# Patient Record
Sex: MALE | Race: WHITE | NOT HISPANIC OR LATINO | Employment: FULL TIME | URBAN - NONMETROPOLITAN AREA
[De-identification: names, ages, dates, MRNs, and addresses within clinical notes are randomized per-mention and may not be internally consistent; named-entity substitution may affect disease eponyms.]

---

## 2022-04-21 ENCOUNTER — HOSPITAL ENCOUNTER (OUTPATIENT)
Dept: RADIOLOGY | Facility: HOSPITAL | Age: 57
Discharge: HOME | End: 2022-04-21
Attending: NURSE PRACTITIONER
Payer: MEDICAID

## 2022-04-21 DIAGNOSIS — M54.50 LOW BACK PAIN: Primary | ICD-10-CM

## 2022-04-21 DIAGNOSIS — M54.50 LOW BACK PAIN: ICD-10-CM

## 2022-04-21 PROCEDURE — 72100 X-RAY EXAM L-S SPINE 2/3 VWS: CPT | Mod: TC

## 2022-12-19 ENCOUNTER — HOSPITAL ENCOUNTER (OUTPATIENT)
Dept: PULMONOLOGY | Facility: HOSPITAL | Age: 57
Discharge: HOME | End: 2022-12-19
Attending: NURSE PRACTITIONER
Payer: MEDICAID

## 2022-12-19 DIAGNOSIS — Z01.818 PREOPERATIVE CLEARANCE: ICD-10-CM

## 2022-12-19 DIAGNOSIS — Z01.818 PREOPERATIVE CLEARANCE: Primary | ICD-10-CM

## 2022-12-19 PROCEDURE — 93010 ELECTROCARDIOGRAM REPORT: CPT | Mod: ,,, | Performed by: INTERNAL MEDICINE

## 2022-12-19 PROCEDURE — 93010 EKG 12-LEAD: ICD-10-PCS | Mod: ,,, | Performed by: INTERNAL MEDICINE

## 2022-12-19 PROCEDURE — 93005 ELECTROCARDIOGRAM TRACING: CPT

## 2023-11-20 ENCOUNTER — HOSPITAL ENCOUNTER (EMERGENCY)
Facility: HOSPITAL | Age: 58
Discharge: ANOTHER HOSPITAL | End: 2023-11-20
Attending: EMERGENCY MEDICINE
Payer: MEDICAID

## 2023-11-20 ENCOUNTER — HOSPITAL ENCOUNTER (INPATIENT)
Facility: HOSPITAL | Age: 58
LOS: 2 days | Discharge: HOME | DRG: 392 | End: 2023-11-22
Attending: INTERNAL MEDICINE | Admitting: INTERNAL MEDICINE
Payer: MEDICAID

## 2023-11-20 VITALS
HEIGHT: 73 IN | DIASTOLIC BLOOD PRESSURE: 76 MMHG | HEART RATE: 114 BPM | WEIGHT: 227 LBS | TEMPERATURE: 98 F | OXYGEN SATURATION: 94 % | RESPIRATION RATE: 20 BRPM | BODY MASS INDEX: 30.09 KG/M2 | SYSTOLIC BLOOD PRESSURE: 104 MMHG

## 2023-11-20 DIAGNOSIS — C79.9 METASTATIC MALIGNANT NEOPLASM, UNSPECIFIED SITE: ICD-10-CM

## 2023-11-20 DIAGNOSIS — K31.89 MASS OF STOMACH: ICD-10-CM

## 2023-11-20 DIAGNOSIS — R07.9 CHEST PAIN: ICD-10-CM

## 2023-11-20 DIAGNOSIS — K31.89 GASTRIC MASS: ICD-10-CM

## 2023-11-20 DIAGNOSIS — K31.89 MASS OF GASTROESOPHAGEAL JUNCTION: Primary | ICD-10-CM

## 2023-11-20 DIAGNOSIS — R13.10 DYSPHAGIA, UNSPECIFIED TYPE: Primary | ICD-10-CM

## 2023-11-20 PROBLEM — D64.9 NORMOCYTIC ANEMIA: Status: ACTIVE | Noted: 2023-11-20

## 2023-11-20 PROBLEM — R63.4 UNINTENTIONAL WEIGHT LOSS: Chronic | Status: ACTIVE | Noted: 2023-11-20

## 2023-11-20 PROBLEM — R13.19 OTHER DYSPHAGIA: Chronic | Status: ACTIVE | Noted: 2023-11-20

## 2023-11-20 LAB
ALBUMIN SERPL BCP-MCNC: 2.9 G/DL (ref 3.5–5.2)
ALP SERPL-CCNC: 66 U/L (ref 55–135)
ALT SERPL W/O P-5'-P-CCNC: 53 U/L (ref 10–44)
ANION GAP SERPL CALC-SCNC: 9 MMOL/L (ref 3–11)
AST SERPL-CCNC: 35 U/L (ref 10–40)
BASOPHILS # BLD AUTO: 0.04 K/UL (ref 0–0.2)
BASOPHILS NFR BLD: 0.4 % (ref 0–1.9)
BILIRUB SERPL-MCNC: 0.8 MG/DL (ref 0.1–1)
BUN SERPL-MCNC: 11 MG/DL (ref 6–20)
CALCIUM SERPL-MCNC: 8.8 MG/DL (ref 8.7–10.5)
CHLORIDE SERPL-SCNC: 108 MMOL/L (ref 95–110)
CO2 SERPL-SCNC: 26 MMOL/L (ref 23–29)
CREAT SERPL-MCNC: 0.9 MG/DL (ref 0.5–1.4)
DIFFERENTIAL METHOD: ABNORMAL
EOSINOPHIL # BLD AUTO: 0 K/UL (ref 0–0.5)
EOSINOPHIL NFR BLD: 0.3 % (ref 0–8)
ERYTHROCYTE [DISTWIDTH] IN BLOOD BY AUTOMATED COUNT: 13.4 % (ref 11.5–14.5)
EST. GFR  (NO RACE VARIABLE): >60 ML/MIN/1.73 M^2
GLUCOSE SERPL-MCNC: 114 MG/DL (ref 70–110)
HCT VFR BLD AUTO: 42.4 % (ref 40–54)
HGB BLD-MCNC: 13.6 G/DL (ref 14–18)
IMM GRANULOCYTES # BLD AUTO: 0.03 K/UL (ref 0–0.04)
IMM GRANULOCYTES NFR BLD AUTO: 0.3 % (ref 0–0.5)
LYMPHOCYTES # BLD AUTO: 1.4 K/UL (ref 1–4.8)
LYMPHOCYTES NFR BLD: 12.8 % (ref 18–48)
MAGNESIUM SERPL-MCNC: 2.2 MG/DL (ref 1.6–2.6)
MCH RBC QN AUTO: 28.3 PG (ref 27–31)
MCHC RBC AUTO-ENTMCNC: 32.1 G/DL (ref 32–36)
MCV RBC AUTO: 88 FL (ref 82–98)
MONOCYTES # BLD AUTO: 0.9 K/UL (ref 0.3–1)
MONOCYTES NFR BLD: 7.7 % (ref 4–15)
NEUTROPHILS # BLD AUTO: 8.7 K/UL (ref 1.8–7.7)
NEUTROPHILS NFR BLD: 78.5 % (ref 38–73)
NRBC BLD-RTO: 0 /100 WBC
PLATELET # BLD AUTO: 238 K/UL (ref 150–450)
PMV BLD AUTO: 11.5 FL (ref 9.2–12.9)
POTASSIUM SERPL-SCNC: 3.8 MMOL/L (ref 3.5–5.1)
PROT SERPL-MCNC: 6.5 G/DL (ref 6–8.4)
RBC # BLD AUTO: 4.8 M/UL (ref 4.6–6.2)
SODIUM SERPL-SCNC: 143 MMOL/L (ref 136–145)
WBC # BLD AUTO: 11.02 K/UL (ref 3.9–12.7)

## 2023-11-20 PROCEDURE — 96374 THER/PROPH/DIAG INJ IV PUSH: CPT

## 2023-11-20 PROCEDURE — 63600175 PHARM REV CODE 636 W HCPCS: Performed by: EMERGENCY MEDICINE

## 2023-11-20 PROCEDURE — 80053 COMPREHEN METABOLIC PANEL: CPT | Performed by: EMERGENCY MEDICINE

## 2023-11-20 PROCEDURE — 36415 COLL VENOUS BLD VENIPUNCTURE: CPT | Performed by: EMERGENCY MEDICINE

## 2023-11-20 PROCEDURE — 83735 ASSAY OF MAGNESIUM: CPT | Performed by: EMERGENCY MEDICINE

## 2023-11-20 PROCEDURE — 63600175 PHARM REV CODE 636 W HCPCS: Performed by: STUDENT IN AN ORGANIZED HEALTH CARE EDUCATION/TRAINING PROGRAM

## 2023-11-20 PROCEDURE — 25500020 PHARM REV CODE 255: Performed by: EMERGENCY MEDICINE

## 2023-11-20 PROCEDURE — 99285 EMERGENCY DEPT VISIT HI MDM: CPT | Mod: 25

## 2023-11-20 PROCEDURE — 20600001 HC STEP DOWN PRIVATE ROOM

## 2023-11-20 PROCEDURE — 96361 HYDRATE IV INFUSION ADD-ON: CPT

## 2023-11-20 PROCEDURE — 85025 COMPLETE CBC W/AUTO DIFF WBC: CPT | Performed by: EMERGENCY MEDICINE

## 2023-11-20 RX ORDER — POLYETHYLENE GLYCOL 3350 17 G/17G
17 POWDER, FOR SOLUTION ORAL DAILY PRN
Status: DISCONTINUED | OUTPATIENT
Start: 2023-11-20 | End: 2023-11-22 | Stop reason: HOSPADM

## 2023-11-20 RX ORDER — ONDANSETRON 8 MG/1
8 TABLET, ORALLY DISINTEGRATING ORAL EVERY 8 HOURS PRN
Status: DISCONTINUED | OUTPATIENT
Start: 2023-11-20 | End: 2023-11-22 | Stop reason: HOSPADM

## 2023-11-20 RX ORDER — SODIUM CHLORIDE, SODIUM LACTATE, POTASSIUM CHLORIDE, CALCIUM CHLORIDE 600; 310; 30; 20 MG/100ML; MG/100ML; MG/100ML; MG/100ML
INJECTION, SOLUTION INTRAVENOUS CONTINUOUS
Status: DISCONTINUED | OUTPATIENT
Start: 2023-11-20 | End: 2023-11-21

## 2023-11-20 RX ORDER — SODIUM CHLORIDE 0.9 % (FLUSH) 0.9 %
1-10 SYRINGE (ML) INJECTION EVERY 12 HOURS PRN
Status: DISCONTINUED | OUTPATIENT
Start: 2023-11-20 | End: 2023-11-22 | Stop reason: HOSPADM

## 2023-11-20 RX ORDER — MAG HYDROX/ALUMINUM HYD/SIMETH 200-200-20
30 SUSPENSION, ORAL (FINAL DOSE FORM) ORAL 4 TIMES DAILY PRN
Status: DISCONTINUED | OUTPATIENT
Start: 2023-11-20 | End: 2023-11-22 | Stop reason: HOSPADM

## 2023-11-20 RX ORDER — ACETAMINOPHEN 325 MG/1
650 TABLET ORAL EVERY 4 HOURS PRN
Status: DISCONTINUED | OUTPATIENT
Start: 2023-11-20 | End: 2023-11-22 | Stop reason: HOSPADM

## 2023-11-20 RX ORDER — ACETAMINOPHEN 325 MG/1
650 TABLET ORAL EVERY 8 HOURS PRN
Status: DISCONTINUED | OUTPATIENT
Start: 2023-11-20 | End: 2023-11-22 | Stop reason: HOSPADM

## 2023-11-20 RX ORDER — SIMETHICONE 80 MG
1 TABLET,CHEWABLE ORAL 4 TIMES DAILY PRN
Status: DISCONTINUED | OUTPATIENT
Start: 2023-11-20 | End: 2023-11-22 | Stop reason: HOSPADM

## 2023-11-20 RX ORDER — NALOXONE HCL 0.4 MG/ML
0.02 VIAL (ML) INJECTION
Status: DISCONTINUED | OUTPATIENT
Start: 2023-11-20 | End: 2023-11-22 | Stop reason: HOSPADM

## 2023-11-20 RX ORDER — TALC
6 POWDER (GRAM) TOPICAL NIGHTLY PRN
Status: DISCONTINUED | OUTPATIENT
Start: 2023-11-20 | End: 2023-11-22 | Stop reason: HOSPADM

## 2023-11-20 RX ORDER — ONDANSETRON 2 MG/ML
4 INJECTION INTRAMUSCULAR; INTRAVENOUS
Status: COMPLETED | OUTPATIENT
Start: 2023-11-20 | End: 2023-11-20

## 2023-11-20 RX ADMIN — SODIUM CHLORIDE, POTASSIUM CHLORIDE, SODIUM LACTATE AND CALCIUM CHLORIDE: 600; 310; 30; 20 INJECTION, SOLUTION INTRAVENOUS at 08:11

## 2023-11-20 RX ADMIN — SODIUM CHLORIDE, POTASSIUM CHLORIDE, SODIUM LACTATE AND CALCIUM CHLORIDE 1000 ML: 600; 310; 30; 20 INJECTION, SOLUTION INTRAVENOUS at 01:11

## 2023-11-20 RX ADMIN — ONDANSETRON 4 MG: 2 INJECTION INTRAMUSCULAR; INTRAVENOUS at 01:11

## 2023-11-20 RX ADMIN — IOHEXOL 100 ML: 350 INJECTION, SOLUTION INTRAVENOUS at 02:11

## 2023-11-20 NOTE — ED PROVIDER NOTES
Encounter Date: 11/20/2023       History     Chief Complaint   Patient presents with    Dysphagia     Pt reports having swallowing problems x2 weeks now, did follow up with PCP and has test scheduled but it is getting worse and now he is weak.      Patient presents emergency department for generalized fatigue problems swallowing solid foods for the last 3 weeks with a 30 lb weight loss.  He has been drinking liquids in protein shakes but eating solid foods he feels get stuck in his throat he has to gag a backup.  No history of cancer.  Denies drinking alcohol or cigarette smoking.  He denies any pain in his chest or abdomen      Review of patient's allergies indicates:   Allergen Reactions    Nsaids (non-steroidal anti-inflammatory drug) Hives     With ASA and Ibuprofen     History reviewed. No pertinent past medical history.  History reviewed. No pertinent surgical history.  History reviewed. No pertinent family history.     Review of Systems   Gastrointestinal:         Dysphagia   All other systems reviewed and are negative.      Physical Exam     Initial Vitals [11/20/23 1219]   BP Pulse Resp Temp SpO2   105/65 102 20 97.5 °F (36.4 °C) 95 %      MAP       --         Physical Exam    Nursing note and vitals reviewed.  Constitutional: He appears well-developed and well-nourished.   HENT:   Head: Normocephalic and atraumatic.   Mildly dry oral mucous membranes   Eyes: EOM are normal. Pupils are equal, round, and reactive to light.   Neck:   Normal range of motion.  Cardiovascular:  Normal rate and regular rhythm.           Pulmonary/Chest: Breath sounds normal. No respiratory distress. He has no wheezes. He has no rales.   Abdominal: Abdomen is soft. Bowel sounds are normal. He exhibits no distension and no mass. There is no abdominal tenderness. There is no guarding.   Musculoskeletal:         General: Normal range of motion.      Cervical back: Normal range of motion.     Neurological: He is alert and oriented to  person, place, and time.         ED Course   Procedures  Labs Reviewed   CBC W/ AUTO DIFFERENTIAL - Abnormal; Notable for the following components:       Result Value    Hemoglobin 13.6 (*)     Gran # (ANC) 8.7 (*)     Gran % 78.5 (*)     Lymph % 12.8 (*)     All other components within normal limits   COMPREHENSIVE METABOLIC PANEL - Abnormal; Notable for the following components:    Glucose 114 (*)     Albumin 2.9 (*)     ALT 53 (*)     All other components within normal limits    Narrative:     Recoll. [id number] by BGB at 11/20/2023 13:25, reason: Specimen   hemolyzed   MAGNESIUM    Narrative:     Recoll. [id number] by BGB at 11/20/2023 13:25, reason: Specimen   hemolyzed          Imaging Results              CT Abdomen Pelvis With IV Contrast (Final result)  Result time 11/20/23 14:44:11      Final result by Travis Reyez MD (11/20/23 14:44:11)                   Impression:      1. A large solid mass which appears to be at least partially contiguous with the lesser curvature of the stomach, raising the question of gastric malignancy.  2. There are multiple enlarged upper abdominal and periaortic lymph nodes, consistent with metastatic disease.  Given the extent of michael enlargement, these findings raise the question of lymphoma with involvement of the stomach.  3. An oval-shaped hypodense lesion in the right hepatic lobe, too small to further characterize.  This could reflect a small cyst or metastatic lesion.      Electronically signed by: Travis Reyez MD  Date:    11/20/2023  Time:    14:44               Narrative:    EXAMINATION:  CT ABDOMEN PELVIS WITH IV CONTRAST    CLINICAL HISTORY:  Dysphagia, 30 lb weight loss in 3 weeks;    TECHNIQUE:  Axial CT images were obtained. Iterative reconstruction technique was used. CT/cardiac nuclear exam/s in prior 12 months: 0.    COMPARISON:  CT chest with IV contrast 11/20/2023.    FINDINGS:  In the right hepatic lobe, a 1.3 cm oval-shaped hypodense lesion,  too small to further characterize (image 41 of series 4 axial).    No gallstones.  The spleen, pancreas, adrenal glands and kidneys demonstrate no abnormality.    There is a moderate to large size hiatal hernia containing the proximal stomach.  There are multiple solid masses in the upper abdomen including along the lesser curvature of the stomach.  Some of these masses appear to be contiguous with the stomach (for example, image 32 of series 4 axial).    Some of these masses appear separate and others appear contiguous.  The largest component of the mass appears to measure approximately 11 x 8 cm (image 33 of series 4 axial).  This measures approximately 8 cm in craniocaudal dimension (image 75 of series 8 coronal).    There are no dilated loops of bowel.  The appendix is not visualized.  No pericecal inflammatory change.  Small amount of free fluid in the pelvis.  No free air.    There are urinary bladder diverticula.    No aortic aneurysm.    Multiple enlarged upper abdominal and periaortic lymph nodes, some of which appear contiguous.  A large michael mass in the upper abdomen measures approximately 7 x 5.5 cm (image 46 of series 4 axial).    Visualized lung bases will be discussed in the chest CT report of same day.  There is no osseous abnormality.                                       CT Chest With Contrast (Final result)  Result time 11/20/23 14:35:09      Final result by Travis Reyez MD (11/20/23 14:35:09)                   Impression:      1. Mild pulmonary emphysema.  2. Bronchitis.  3. Moderate to large size hiatal hernia containing the proximal stomach.      Electronically signed by: Travis Reyez MD  Date:    11/20/2023  Time:    14:35               Narrative:    EXAMINATION:  CT CHEST WITH CONTRAST    CLINICAL HISTORY:  Dysphagia, 30 lb weight loss over 3 weeks;    TECHNIQUE:  Axial CT images were obtained. Iterative reconstruction technique was used.  CT/cardiac nuclear exam/s in prior 12  months: 0.    FINDINGS:  Breathing motion artifact on some images.    There is mucosal thickening in the airways bilaterally.    Mild pulmonary emphysema.  There is no pleural or pericardial fluid.  No aortic aneurysm.  No hilar or mediastinal lymph node enlargement.  There is a moderate to large size hiatal hernia containing the proximal stomach.  The visualized upper abdomen will be discussed in the abdomen pelvis CT report of same day.  No axillary lymph node enlargement.  No osseous abnormality.                                       Medications   lactated ringers bolus 1,000 mL (0 mLs Intravenous Stopped 11/20/23 1445)   ondansetron injection 4 mg (4 mg Intravenous Given 11/20/23 1316)   iohexoL (OMNIPAQUE 350) injection 100 mL (100 mLs Intravenous Given 11/20/23 1410)     Medical Decision Making  Amount and/or Complexity of Data Reviewed  Labs: ordered.  Radiology: ordered.    Risk  Prescription drug management.                          Medical Decision Making:   Initial Assessment:   The patient has been having symptoms that started 3 weeks ago and if not improved problems swallowing solid foods not improving.  Recent fevers illnesses no chest pain cough congestion or shortness of breath and denies any abdominal pain.  Denies any history of cancer.  Does not smoke cigarettes or drink alcohol.  Workup currently in progress  Differential Diagnosis:   Stricture, neurologic dysfunction, achalasia, neoplasm  ED Management:  Transfer for higher level of care, patient has mass in stomach, dysphagia, concern for mets          Clinical Impression:  Final diagnoses:  [R13.10] Dysphagia, unspecified type (Primary)  [K31.89] Mass of stomach  [C79.9] Metastatic malignant neoplasm, unspecified site          ED Disposition Condition    Transfer to Another Facility Stable                Dakotah Liu MD  11/21/23 9951

## 2023-11-20 NOTE — PROVIDER TRANSFER
Outside Transfer Acceptance Note / Regional Referral Center    Referring facility: [facility]   Referring provider: GAVIOTA HINKLE  Accepting facility: [facility]  Accepting provider: Jose Orellana MD  Admitting provider: [facility] provider  Reason for transfer:  heme/onc and GI evaluation   Transfer diagnosis: gastric mass  metastatic disease  dysphagia  Transfer specialty requested: Gastroenterology  Transfer specialty notified: Yes  Transfer level: NUMBER 1-5: 2  Bed type requested: med/tele  Isolation status: No active isolations   Admission class or status: IP- Inpatient      Narrative     Mr. David Benjamin is a 58 year old gentleman with no PMH of chronic lower back pain who presented to [facility] ED with chief complain of dysphagia, decreased po intake and progressive generalized weakness for the past 3 weeks. Patient states that he was able to followup with his PCP and get a test ordered as part the workup for his dysphagia but his symptoms worsened to the poin that he had to go to the ED. Patient also mentions fatigue, dysphagia with solids and a 30 pound weight loss in the past 3 weeks.  He has been drinking liquids and protein shakes but when he eats solid foods, he feels like the food gets stuck in his throat and he is forced to gag it back up. He denies fever, chills, chest pain, SOB, nausea, abdominal pain, swelling, confusion or bleeding. Denies any history of cancer.  Does not smoke cigarettes or drink alcohol. Patient notes that he was taking Tramadol for his previous back surgery but discontinued the medication due to concern that his dysphagia was an allergic reaction to the medication. Patient reports slight relief in his lower back pain from a steroid shot given by his PCP but notes increased BLE weakness upon ED arrival.      In the ED, vital signs showed: temp 97.5 F, /65, , RR 20, O2 sat 95% on room air.   Physical exam notable for dry  "mucous membranes, lungs clear to auscultation and abdomen soft, nontender. No focal neuro deficits appreciated. Patient alert and oriented x4. Labwork showed:    Recent Labs   Lab 11/20/23  1312   WBC 11.02   RBC 4.80   HGB 13.6*   HCT 42.4      MCV 88   MCH 28.3   MCHC 32.1      Recent Labs   Lab 11/20/23  1334   CALCIUM 8.8   ALBUMIN 2.9*   PROT 6.5      K 3.8   CO2 26      BUN 11   CREATININE 0.9   ALKPHOS 66   ALT 53*   AST 35   BILITOT 0.8      Recent Labs   Lab 11/20/23  1334   ALT 53*   AST 35   ALKPHOS 66   BILITOT 0.8   PROT 6.5   ALBUMIN 2.9*      CT A/P with IV contrast showed "A large solid mass which appears to be at least partially contiguous with the lesser curvature of the stomach, raising the question of gastric malignancy. multiple enlarged upper abdominal and periaortic lymph nodes, consistent with metastatic disease.  Given the extent of michael enlargement, these findings raise the question of lymphoma with involvement of the stomach. An oval-shaped hypodense lesion in the right hepatic lobe, too small to further characterize.  This could reflect a small cyst or metastatic lesion." CXR showed " Mild pulmonary emphysema. Bronchitis. Moderate to large size hiatal hernia containing the proximal stomach."  Patient was given 1 L NS bolus and one dose of 4mg IV zofran.  Dr. Dakotah Liu ([facility] ED provider) contacted [facility] to request patient transfer to [facility] for heme/onc evaluation of gastric mass concerning for metastases. [facility] notified Dr. Stallworth ([facility] heme/onc) to discuss the case and agreed to consult on the patient with admission to Hospital Medicine. Patient will also benefit from GI consultation for evaluation of dysphagia and possible biopsy of gastric mass. Patient will require med/tele bed.      Objective     Vitals:  see above  Recent Labs: All pertinent labs within the past 24 hours have been reviewed.see above  Recent imaging: see above   Airway:   RA  Vent " settings:  N/A       IV access:        Peripheral IV - Single Lumen 11/20/23 1351 20 G Left Hand (Active)   Site Assessment Clean;Dry;Intact;No redness;No swelling 11/20/23 1352   Line Status Blood return noted;Saline locked;Flushed 11/20/23 1352   Dressing Status Clean;Dry;Intact 11/20/23 1352     Infusions: None  Allergies:   Review of patient's allergies indicates:   Allergen Reactions    Advil [ibuprofen]     Penicillins       NPO: No    Anticoagulation:   Anticoagulants       None             Instructions      [facility]-  Admit to Hospital Medicine  Upon patient arrival to floor, please send SecureChat to [facility] or call extension [phone] (if no answer, do NOT leave a callback number after the beep, rather please send a SecureChat to [facility]), for Hospital Medicine admit team assignment and for additional admit orders for the patient.  Do not page the attending physician associated with the patient on arrival (this physician may not be on duty at the time of arrival).  Rather, always send a SecureChat to [facility] or call [phone] to reach the triage physician for orders and team assignment.

## 2023-11-20 NOTE — ED NOTES
Patient reports that he was taking Tramadol for his previous back surgery. Patient stopped taking Tramadol approximately 3 weeks ago believing that he was having an allergic reaction causing his swallowing problems. Patient reports slight relief from a steroid shot given by his PCP on Friday but increased weakness today. Patient reports approximately 25 pound weight loss.

## 2023-11-21 ENCOUNTER — ANESTHESIA EVENT (OUTPATIENT)
Dept: ENDOSCOPY | Facility: HOSPITAL | Age: 58
DRG: 392 | End: 2023-11-21
Payer: MEDICAID

## 2023-11-21 ENCOUNTER — ANESTHESIA (OUTPATIENT)
Dept: ENDOSCOPY | Facility: HOSPITAL | Age: 58
DRG: 392 | End: 2023-11-21
Payer: MEDICAID

## 2023-11-21 LAB
ALBUMIN SERPL BCP-MCNC: 2.9 G/DL (ref 3.5–5.2)
ALP SERPL-CCNC: 64 U/L (ref 55–135)
ALT SERPL W/O P-5'-P-CCNC: 58 U/L (ref 10–44)
ANION GAP SERPL CALC-SCNC: 9 MMOL/L (ref 8–16)
AST SERPL-CCNC: 35 U/L (ref 10–40)
BASOPHILS # BLD AUTO: 0.03 K/UL (ref 0–0.2)
BASOPHILS NFR BLD: 0.3 % (ref 0–1.9)
BILIRUB SERPL-MCNC: 0.8 MG/DL (ref 0.1–1)
BUN SERPL-MCNC: 12 MG/DL (ref 6–20)
CALCIUM SERPL-MCNC: 8.6 MG/DL (ref 8.7–10.5)
CEA SERPL-MCNC: 2.6 NG/ML (ref 0–5)
CHLORIDE SERPL-SCNC: 106 MMOL/L (ref 95–110)
CO2 SERPL-SCNC: 24 MMOL/L (ref 23–29)
CREAT SERPL-MCNC: 0.7 MG/DL (ref 0.5–1.4)
DIFFERENTIAL METHOD: ABNORMAL
EOSINOPHIL # BLD AUTO: 0.1 K/UL (ref 0–0.5)
EOSINOPHIL NFR BLD: 0.6 % (ref 0–8)
ERYTHROCYTE [DISTWIDTH] IN BLOOD BY AUTOMATED COUNT: 13.5 % (ref 11.5–14.5)
EST. GFR  (NO RACE VARIABLE): >60 ML/MIN/1.73 M^2
GLUCOSE SERPL-MCNC: 138 MG/DL (ref 70–110)
HCT VFR BLD AUTO: 38.3 % (ref 40–54)
HGB BLD-MCNC: 12.3 G/DL (ref 14–18)
IMM GRANULOCYTES # BLD AUTO: 0.05 K/UL (ref 0–0.04)
IMM GRANULOCYTES NFR BLD AUTO: 0.5 % (ref 0–0.5)
LYMPHOCYTES # BLD AUTO: 1.6 K/UL (ref 1–4.8)
LYMPHOCYTES NFR BLD: 14.6 % (ref 18–48)
MAGNESIUM SERPL-MCNC: 1.8 MG/DL (ref 1.6–2.6)
MCH RBC QN AUTO: 28 PG (ref 27–31)
MCHC RBC AUTO-ENTMCNC: 32.1 G/DL (ref 32–36)
MCV RBC AUTO: 87 FL (ref 82–98)
MONOCYTES # BLD AUTO: 0.8 K/UL (ref 0.3–1)
MONOCYTES NFR BLD: 7.3 % (ref 4–15)
NEUTROPHILS # BLD AUTO: 8.2 K/UL (ref 1.8–7.7)
NEUTROPHILS NFR BLD: 76.7 % (ref 38–73)
NRBC BLD-RTO: 0 /100 WBC
PLATELET # BLD AUTO: 233 K/UL (ref 150–450)
PMV BLD AUTO: 11.8 FL (ref 9.2–12.9)
POTASSIUM SERPL-SCNC: 3.8 MMOL/L (ref 3.5–5.1)
PROT SERPL-MCNC: 6 G/DL (ref 6–8.4)
RBC # BLD AUTO: 4.4 M/UL (ref 4.6–6.2)
SODIUM SERPL-SCNC: 139 MMOL/L (ref 136–145)
WBC # BLD AUTO: 10.63 K/UL (ref 3.9–12.7)

## 2023-11-21 PROCEDURE — 43239 EGD BIOPSY SINGLE/MULTIPLE: CPT | Mod: ,,, | Performed by: STUDENT IN AN ORGANIZED HEALTH CARE EDUCATION/TRAINING PROGRAM

## 2023-11-21 PROCEDURE — 99223 PR INITIAL HOSPITAL CARE,LEVL III: ICD-10-PCS | Mod: ,,, | Performed by: SURGERY

## 2023-11-21 PROCEDURE — 37000008 HC ANESTHESIA 1ST 15 MINUTES: Performed by: STUDENT IN AN ORGANIZED HEALTH CARE EDUCATION/TRAINING PROGRAM

## 2023-11-21 PROCEDURE — 63600175 PHARM REV CODE 636 W HCPCS: Performed by: NURSE ANESTHETIST, CERTIFIED REGISTERED

## 2023-11-21 PROCEDURE — 80053 COMPREHEN METABOLIC PANEL: CPT | Performed by: HOSPITALIST

## 2023-11-21 PROCEDURE — 25000003 PHARM REV CODE 250: Performed by: STUDENT IN AN ORGANIZED HEALTH CARE EDUCATION/TRAINING PROGRAM

## 2023-11-21 PROCEDURE — 25000003 PHARM REV CODE 250: Performed by: NURSE ANESTHETIST, CERTIFIED REGISTERED

## 2023-11-21 PROCEDURE — D9220A PRA ANESTHESIA: ICD-10-PCS | Mod: CRNA,,, | Performed by: NURSE ANESTHETIST, CERTIFIED REGISTERED

## 2023-11-21 PROCEDURE — 92610 EVALUATE SWALLOWING FUNCTION: CPT

## 2023-11-21 PROCEDURE — 99223 1ST HOSP IP/OBS HIGH 75: CPT | Mod: ,,, | Performed by: SURGERY

## 2023-11-21 PROCEDURE — 82378 CARCINOEMBRYONIC ANTIGEN: CPT | Performed by: HOSPITALIST

## 2023-11-21 PROCEDURE — 20600001 HC STEP DOWN PRIVATE ROOM

## 2023-11-21 PROCEDURE — 27201012 HC FORCEPS, HOT/COLD, DISP: Performed by: STUDENT IN AN ORGANIZED HEALTH CARE EDUCATION/TRAINING PROGRAM

## 2023-11-21 PROCEDURE — 97535 SELF CARE MNGMENT TRAINING: CPT

## 2023-11-21 PROCEDURE — 83735 ASSAY OF MAGNESIUM: CPT | Performed by: HOSPITALIST

## 2023-11-21 PROCEDURE — C9113 INJ PANTOPRAZOLE SODIUM, VIA: HCPCS | Performed by: HOSPITALIST

## 2023-11-21 PROCEDURE — D9220A PRA ANESTHESIA: Mod: CRNA,,, | Performed by: NURSE ANESTHETIST, CERTIFIED REGISTERED

## 2023-11-21 PROCEDURE — 99233 PR SUBSEQUENT HOSPITAL CARE,LEVL III: ICD-10-PCS | Mod: ,,, | Performed by: HOSPITALIST

## 2023-11-21 PROCEDURE — 37000009 HC ANESTHESIA EA ADD 15 MINS: Performed by: STUDENT IN AN ORGANIZED HEALTH CARE EDUCATION/TRAINING PROGRAM

## 2023-11-21 PROCEDURE — 85025 COMPLETE CBC W/AUTO DIFF WBC: CPT | Performed by: HOSPITALIST

## 2023-11-21 PROCEDURE — 63600175 PHARM REV CODE 636 W HCPCS: Performed by: STUDENT IN AN ORGANIZED HEALTH CARE EDUCATION/TRAINING PROGRAM

## 2023-11-21 PROCEDURE — 63600175 PHARM REV CODE 636 W HCPCS: Performed by: HOSPITALIST

## 2023-11-21 PROCEDURE — 99223 1ST HOSP IP/OBS HIGH 75: CPT | Mod: 25,,, | Performed by: STUDENT IN AN ORGANIZED HEALTH CARE EDUCATION/TRAINING PROGRAM

## 2023-11-21 PROCEDURE — 99223 PR INITIAL HOSPITAL CARE,LEVL III: ICD-10-PCS | Mod: 25,,, | Performed by: STUDENT IN AN ORGANIZED HEALTH CARE EDUCATION/TRAINING PROGRAM

## 2023-11-21 PROCEDURE — 99233 SBSQ HOSP IP/OBS HIGH 50: CPT | Mod: ,,, | Performed by: HOSPITALIST

## 2023-11-21 PROCEDURE — D9220A PRA ANESTHESIA: ICD-10-PCS | Mod: ANES,,, | Performed by: ANESTHESIOLOGY

## 2023-11-21 PROCEDURE — D9220A PRA ANESTHESIA: Mod: ANES,,, | Performed by: ANESTHESIOLOGY

## 2023-11-21 PROCEDURE — 43239 EGD BIOPSY SINGLE/MULTIPLE: CPT | Performed by: STUDENT IN AN ORGANIZED HEALTH CARE EDUCATION/TRAINING PROGRAM

## 2023-11-21 PROCEDURE — 43239 PR EGD, FLEX, W/BIOPSY, SGL/MULTI: ICD-10-PCS | Mod: ,,, | Performed by: STUDENT IN AN ORGANIZED HEALTH CARE EDUCATION/TRAINING PROGRAM

## 2023-11-21 RX ORDER — PANTOPRAZOLE SODIUM 40 MG/1
40 TABLET, DELAYED RELEASE ORAL
Status: DISCONTINUED | OUTPATIENT
Start: 2023-11-21 | End: 2023-11-21

## 2023-11-21 RX ORDER — PROPOFOL 10 MG/ML
VIAL (ML) INTRAVENOUS
Status: DISCONTINUED | OUTPATIENT
Start: 2023-11-21 | End: 2023-11-21

## 2023-11-21 RX ORDER — FENTANYL CITRATE 50 UG/ML
25 INJECTION, SOLUTION INTRAMUSCULAR; INTRAVENOUS EVERY 5 MIN PRN
Status: DISCONTINUED | OUTPATIENT
Start: 2023-11-21 | End: 2023-11-21 | Stop reason: HOSPADM

## 2023-11-21 RX ORDER — PANTOPRAZOLE SODIUM 40 MG/10ML
40 INJECTION, POWDER, LYOPHILIZED, FOR SOLUTION INTRAVENOUS EVERY 12 HOURS
Status: DISCONTINUED | OUTPATIENT
Start: 2023-11-21 | End: 2023-11-22

## 2023-11-21 RX ORDER — SODIUM CHLORIDE 0.9 % (FLUSH) 0.9 %
10 SYRINGE (ML) INJECTION
Status: DISCONTINUED | OUTPATIENT
Start: 2023-11-21 | End: 2023-11-21 | Stop reason: HOSPADM

## 2023-11-21 RX ORDER — LIDOCAINE HYDROCHLORIDE 20 MG/ML
INJECTION INTRAVENOUS
Status: DISCONTINUED | OUTPATIENT
Start: 2023-11-21 | End: 2023-11-21

## 2023-11-21 RX ADMIN — Medication 6 MG: at 09:11

## 2023-11-21 RX ADMIN — PROPOFOL 70 MG: 10 INJECTION, EMULSION INTRAVENOUS at 10:11

## 2023-11-21 RX ADMIN — LIDOCAINE HYDROCHLORIDE 100 MG: 20 INJECTION INTRAVENOUS at 10:11

## 2023-11-21 RX ADMIN — ONDANSETRON 8 MG: 8 TABLET, ORALLY DISINTEGRATING ORAL at 01:11

## 2023-11-21 RX ADMIN — PANTOPRAZOLE SODIUM 40 MG: 40 INJECTION, POWDER, FOR SOLUTION INTRAVENOUS at 04:11

## 2023-11-21 RX ADMIN — SODIUM CHLORIDE, POTASSIUM CHLORIDE, SODIUM LACTATE AND CALCIUM CHLORIDE: 600; 310; 30; 20 INJECTION, SOLUTION INTRAVENOUS at 11:11

## 2023-11-21 RX ADMIN — SODIUM CHLORIDE: 0.9 INJECTION, SOLUTION INTRAVENOUS at 09:11

## 2023-11-21 NOTE — ASSESSMENT & PLAN NOTE
Patient presented with dysphagia, and CT revealed large, solid gastric mass (up to 8cm) with associated LAD and hepatic lesion concerning for malignancy with mets. Will consult GI and Heme-Onc for further workup and recommendations. Will likely benefit from EGD with biopsy at some point; will keep on clear liquid diet for now and NPO after midnight in case this could be performed tomorrow.

## 2023-11-21 NOTE — CONSULTS
[facility] - Oncology ([facility])  Adult Nutrition  Consult Note    SUMMARY     Recommendations    1. Continue FLD. Advance diet as tolerated to soft diet pending tolerance, then regular.   2. Add ONS [employer] Peptide 1.5 vanilla to all meals.   3. Calorie count initiated 11/21. RD to collect 11/24.   4. If J-tube TF rec's warranted, rec continuous [employer] Peptide 1.5 50 ml/hr to provide 1846 kcal, 89 g pro, 840 mL water. Meets 100% EEN, 100% EPN. Offer 1000 mL additional fluids by mouth or JT for hydration.     Goals: Meet % EEN by RD f/u.  Nutrition Goal Status: new  Communication of RD Recs: other (comment) (POC)    Assessment and Plan    Nutrition Problem  Inadequate oral intake    Related to (etiology):   Inability to consume sufficient calories    Signs and Symptoms (as evidenced by):   Mass of gastroesophageal junction  Nausea    Weight loss of 25 lbs x 3-4 weeks reported    Interventions/Recommendations (treatment strategy):  Collaboration of nutrition care with other providers  ONS  Calorie count    Nutrition Diagnosis Status:   New      Reason for Assessment    Reason For Assessment: consult  Diagnosis: other (see comments) (Mass of gastroesophageal junction)  Relevant Medical History: no significant PMH  Interdisciplinary Rounds: did not attend  General Information Comments: RD consulted for calorie count and weight loss. Per chart, 25 lb weight loss since onset of symptoms (about 3-4 weeks ago). UBW ~250 lbs. Patient and caregiver report intentional weight loss - diet 1 meal per day around 3-4 pm, and incorporating more fruits/veggies and cutting out junk food. Drinks Owyn nutrition shake at home which provides 120 kcal, and 20 g pro per carton. RD observed chocolate flavor in room, but reports will also drink vanilla. RD recommended [employer] ONS (similar to home supplement). Patient reports he will try. Suspect mass of gastroesophageal junction also contributing to weight loss. Patient  "reports his appetite and PO intake decreased. Since his PO intake decreased significantly, his LBM reported 5-6 days ago. Reports forcing himself to eat. RD observed lunch tray FLD patient consumed ~50% of tray. RD helped caregiver at bedside write this in calorie count. Calorie count provided to caregiver at bedside for 11/21, 11/22, and 11/23. RD will  on 11/24. RD reviewed how to document calorie count. Caregiver at bedside agrees to document. Patient reports does not like articial sugar alternatives such as sucralose and aspartame. Unable to perform NFPE d/t patient taking rest after endoscopy procedure today (suspect malnutrition). Reports sulfur burps. Patient endorses nausea. Denies V/D. Had some constipation in past month - took senna at home.  Nutrition Discharge Planning: Pending clinical course    Nutrition Risk Screen    Nutrition Risk Screen: reduced oral intake over the last month    Nutrition/Diet History    Spiritual, Cultural Beliefs, Scientologist Practices, Values that Affect Care: no  Food Allergies: NKFA    Anthropometrics    Temp: 97.7 °F (36.5 °C)  Height: 6' 1" (185.4 cm)  Height (inches): 73 in  Weight Method: Standard Scale  Weight: 103 kg (227 lb)  Weight (lb): 227 lb  Ideal Body Weight (IBW), Male: 184 lb  % Ideal Body Weight, Male (lb): 123.37 %  BMI (Calculated): 30  BMI Grade: 30 - 34.9- obesity - grade I       Lab/Procedures/Meds    Pertinent Labs Reviewed: reviewed  Pertinent Labs Comments: Glu 138, Ca 8.6, Alb 2.9, ALT 58, RBC 4.4, H/H 12.3/38.3  Pertinent Medications Reviewed: reviewed  Pertinent Medications Comments: ondansetron, polyethylene glycol    Estimated/Assessed Needs    Weight Used For Calorie Calculations: 83.6 kg (184 lb 6.3 oz) (IBW)  Energy Calorie Requirements (kcal): 8734-2784 kcal (20-25 kcal/kg)  Energy Need Method: Kcal/kg  Protein Requirements:  g (1-1.2 g/kg)  Weight Used For Protein Calculations: 83.6 kg (184 lb 6.3 oz) (IBW)  Fluid Requirements " (mL): 1 ml/kcal or per MD     RDA Method (mL): 1673         Nutrition Prescription Ordered    Current Diet Order: FLD    Evaluation of Received Nutrient/Fluid Intake    I/O: +226 mL since admit  Comments: LBM: 11/15  Tolerance: tolerating  % Intake of Estimated Energy Needs: 25 - 50 %  % Meal Intake: 25 - 50 %    Nutrition Risk    Level of Risk/Frequency of Follow-up:  (1x/wk)       Monitor and Evaluation    Food and Nutrient Intake: energy intake, food and beverage intake  Food and Nutrient Adminstration: diet order  Knowledge/Beliefs/Attitudes: food and nutrition knowledge/skill  Anthropometric Measurements: height/length, weight, weight change, body mass index  Biochemical Data, Medical Tests and Procedures: electrolyte and renal panel, gastrointestinal profile, glucose/endocrine profile, inflammatory profile, lipid profile  Nutrition-Focused Physical Findings: overall appearance, extremities, muscles and bones, head and eyes, skin       Nutrition Follow-Up    RD Follow-up?: Yes

## 2023-11-21 NOTE — ASSESSMENT & PLAN NOTE
Mild, without indication for transfusion. Recommend outpatient PCP evaluation.     Patient's with Normocytic anemia.. Hemoglobin stable. Etiology likely due to chronic disease .  Current CBC reviewed-    Recent Labs   Lab 11/20/23  1312   HGB 13.6*         Component Value Date/Time    MCV 88 11/20/2023 1312    RDW 13.4 11/20/2023 1312     Monitor CBC and transfuse if H/H drops below 7/21.

## 2023-11-21 NOTE — TREATMENT PLAN
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 Extends                          proximally into the mid-esophagus. Proximal extent                          at 36 cm. Biopsied.                          - LA Grade C esophagitis.                          - Duodenitis.                          - Duodenal ulcers with a clean ulcer base (Frank                          Class III).     Recommendation:                                 - Await pathology results. Sent as rush.                          - Return patient to [facility] for ongoing care.                          - Full liquid diet.                          - Continue present medications.                          - Consult surgical oncology.     - Start oral protonix 40 mg BID x8 weeks.    - We will sign off.    Yenifer Romero MD  Gastroenterology, PGY-4

## 2023-11-21 NOTE — HOSPITAL COURSE
11/21 dysphagia with  CT revealed large, solid gastric mass (up to 8cm) with associated LAD and hepatic lesion concerning for malignancy with metstases.  GI and Heme-Onc consulted. 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 Extends  proximally into the mid-esophagus. Proximal extent  at 36 cm. Biopsied.                          - LA Grade C esophagitis.                          - Duodenitis.                          - Duodenal ulcers with a clean ulcer base (Frank                          Class III).    continue Full liquid diet. surgical oncology consulted . protonix BID for 8 weeks . surgical oncology eval -  not a candidate for upfront resection and would need medical oncology evaluation and treatment to precede any potential surgical intervention.  Dietary consulted for calorie count. started on boost plus TID.  if unable to keep adequate oral intake with full liquids, General surgery  plans for J tube placement and  port placement on friday. patient does not want a feeding tube right now as he can tolerate full liquids.  follow up in surgery clinic with  biopsy results.   11/22 CEA WNL. Heme/onc eval - No inpatient recommendations from Oncologic standpoint. Patient can follow up in clinic with GI Oncologist to follow pathology results.  tolerating  full liquid diet and protonix PO today. discharge home with surgical oncology and heme onc f/u c/o dark stools this AM. taking peptobismol and kaopectate at home

## 2023-11-21 NOTE — NURSING TRANSFER
"  Nursing Transfer Note      11/21/2023   10:55 AM    Transfer To: Room 854    Transfer via stretcher    Transported by PCT    Telemetry: Rate 73  Order for Tele Monitor? No    4eyes on Skin: yes    Medicines sent: None     Any special needs or follow-up needed: None    Patient belongings transferred with patient: No    Chart send with patient: Yes    Notified: pt states that spouse "is on the way"    Patient reassessed at: 11/21/2023   10:27 (date, time)  "

## 2023-11-21 NOTE — ASSESSMENT & PLAN NOTE
Suspect secondary to large gastric mass. Appreciate GI recommendations. Continue liquid diet for now.

## 2023-11-21 NOTE — ANESTHESIA PREPROCEDURE EVALUATION
"                                                                                                             11/21/2023  David Benjamin Jr. is a 58 y.o., male.for whom GI is consulted for gastric mass. He has a medical history signifciant for former tobacco and alcohol use. History provided by patient and chart review.        Pt states over the last few weeks he has been experiencing diffuse abdominal pain with associated bloating. Associated symptoms include a weight loss of at least 25 lbs since onset of symptoms as well as nausea. On arrival, he was afebrile and HDS. Laboratory workup notable for normocytic anemia with hgb 13.6, hypoalbuminemia 2.9 and ALT 54. CTAP showed, "A large solid mass which appears to be at least partially contiguous with the lesser curvature of the stomach, raising the question of gastric malignancy.There are multiple enlarged upper abdominal and periaortic lymph nodes, consistent with metastatic disease.   Pre-op Assessment    I have reviewed the Patient Summary Reports.     I have reviewed the Nursing Notes. I have reviewed the NPO Status.   I have reviewed the Medications.     Review of Systems  Anesthesia Hx:  No previous Anesthesia   Neg history of prior surgery.          Denies Family Hx of Anesthesia complications.    Denies Personal Hx of Anesthesia complications.                    Social:  Non-Smoker, No Alcohol Use       Hematology/Oncology:  Hematology Normal                                 Oncology Comments: Gastric mass     EENT/Dental:  EENT/Dental Normal           Cardiovascular:  Cardiovascular Normal Exercise tolerance: good                                           Pulmonary:  Pulmonary Normal                       Renal/:  Renal/ Normal                 Hepatic/GI:  Hepatic/GI Normal                 Musculoskeletal:  Musculoskeletal Normal                Neurological:  Neurology Normal                                      Endocrine:  Endocrine Normal         "    Dermatological:  Skin Normal    Psych:  Psychiatric Normal                    Physical Exam  General: Well nourished, Cooperative, Alert and Oriented    Airway:  Mallampati: II / II  Mouth Opening: Normal  TM Distance: Normal  Tongue: Normal  Neck ROM: Normal ROM    Chest/Lungs:  Clear to auscultation, Normal Respiratory Rate      Anesthesia Plan  Type of Anesthesia, risks & benefits discussed:    Anesthesia Type: Gen Natural Airway, MAC  Intra-op Monitoring Plan: Standard ASA Monitors  Post Op Pain Control Plan: multimodal analgesia and IV/PO Opioids PRN  Induction:  IV and Inhalation  Informed Consent: Informed consent signed with the Patient and all parties understand the risks and agree with anesthesia plan.  All questions answered. Patient consented to blood products? No  ASA Score: 2  Day of Surgery Review of History & Physical: H&P Update referred to the surgeon/provider.    Ready For Surgery From Anesthesia Perspective.     .

## 2023-11-21 NOTE — PROGRESS NOTES
Nurses Note -- 4 Eyes      11/20/2023   10:00 PM      Skin assessed during: Admit      [x] No Altered Skin Integrity Present    [x]Prevention Measures Documented      [] Yes- Altered Skin Integrity Present or Discovered   [] LDA Added if Not in Epic (Describe Wound)   [] New Altered Skin Integrity was Present on Admit and Documented in LDA   [] Wound Image Taken    Wound Care Consulted? No    Attending Nurse:  Ivone Rosenthal RN/Staff Member:   Delia Fitzgerald

## 2023-11-21 NOTE — NURSING
Patient admitted to room 854 from [facility]. Vital signs stable on room air. Oriented x4. Ambulatory; urinal provided. No noted skin issues. LDAs: 20 G Left hand PIV. Informed of unit routines. Addressed all needs for comfort and orientation to floor. Dr. Tamiko CHAVEZ notified of arrival to room.

## 2023-11-21 NOTE — ASSESSMENT & PLAN NOTE
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 He is not a candidate for upfront resection and would need medical oncology evaluation and treatment to precede any potential surgical intervention.     - Await final pathology results  - Please obtain CEA. He will likely need PET scan as well, however this can be done on outpatient basis following final pathology.  - Full liquid diet as tolerated. If he can tolerate full liquids and maintain adequate caloric intake with this, can DC with medical oncology referral and complete his work up outpatient. If he is unable to tolerate adequate caloric intake by mouth, would consider J tube placement, potentially could add on this Friday if need be. Would place port at that time.

## 2023-11-21 NOTE — ANESTHESIA POSTPROCEDURE EVALUATION
Anesthesia Post Evaluation    Patient: David Benjamin Jr.    Procedure(s) Performed: Procedure(s) (LRB):  EGD (ESOPHAGOGASTRODUODENOSCOPY) (N/A)    Final Anesthesia Type: general      Patient location during evaluation: GI PACU  Patient participation: Yes- Able to Participate  Level of consciousness: awake and alert, awake and oriented  Post-procedure vital signs: reviewed and stable  Pain management: adequate  Airway patency: patent    PONV status at discharge: No PONV  Anesthetic complications: no      Cardiovascular status: blood pressure returned to baseline, stable and hemodynamically stable  Respiratory status: unassisted, spontaneous ventilation and room air  Hydration status: euvolemic  Follow-up not needed.      Vitals Value Taken Time   /60 11/21/23 1047   Temp 36.5 °C (97.7 °F) 11/21/23 1026   Pulse 82 11/21/23 1056   Resp 11 11/21/23 1056   SpO2 98 % 11/21/23 1056   Vitals shown include unvalidated device data.      Event Time   Out of Recovery 10:55:00         Pain/Mary Alice Score: Mary Alice Score: 10 (11/21/2023 10:26 AM)

## 2023-11-21 NOTE — SUBJECTIVE & OBJECTIVE
No past medical history on file.    No past surgical history on file.    Review of patient's allergies indicates:   Allergen Reactions    Advil [ibuprofen]     Penicillins        Current Facility-Administered Medications on File Prior to Encounter   Medication    [COMPLETED] iohexoL (OMNIPAQUE 350) injection 100 mL    [COMPLETED] lactated ringers bolus 1,000 mL    [COMPLETED] ondansetron injection 4 mg     No current outpatient medications on file prior to encounter.     Family History    None       Tobacco Use    Smoking status: Not on file    Smokeless tobacco: Not on file   Substance and Sexual Activity    Alcohol use: Not on file    Drug use: Not on file    Sexual activity: Not on file     Review of Systems   Constitutional:         All pertinent other ROS noted in HPI   All other systems reviewed and are negative.    Objective:     Vital Signs (Most Recent):  Temp: 97.9 °F (36.6 °C) (11/20/23 1955)  Pulse: 101 (11/20/23 1955)  Resp: 18 (11/20/23 1955)  BP: 112/70 (11/20/23 1955)  SpO2: (!) 94 % (11/20/23 1955) Vital Signs (24h Range):  Temp:  [97.5 °F (36.4 °C)-97.9 °F (36.6 °C)] 97.9 °F (36.6 °C)  Pulse:  [101-114] 101  Resp:  [18-20] 18  SpO2:  [94 %-95 %] 94 %  BP: (104-112)/(65-76) 112/70        There is no height or weight on file to calculate BMI.     Physical Exam  Vitals and nursing note reviewed.   Constitutional:       General: He is not in acute distress.     Appearance: He is well-developed. He is obese. He is not ill-appearing or diaphoretic.   HENT:      Head: Normocephalic and atraumatic.   Eyes:      General: No scleral icterus.     Conjunctiva/sclera: Conjunctivae normal.   Neck:      Vascular: No JVD.   Cardiovascular:      Rate and Rhythm: Normal rate and regular rhythm.   Pulmonary:      Effort: Pulmonary effort is normal. No respiratory distress.   Abdominal:      General: There is no distension.      Tenderness: There is no abdominal tenderness. There is no guarding.   Skin:      Coloration: Skin is not jaundiced or pale.   Neurological:      Mental Status: He is alert and oriented to person, place, and time.      Motor: No abnormal muscle tone.   Psychiatric:         Mood and Affect: Mood normal.         Behavior: Behavior normal.                Significant Labs: All pertinent labs within the past 24 hours have been reviewed.  CBC:   Recent Labs   Lab 11/20/23  1312   WBC 11.02   HGB 13.6*   HCT 42.4        CMP:   Recent Labs   Lab 11/20/23  1334      K 3.8      CO2 26   *   BUN 11   CREATININE 0.9   CALCIUM 8.8   PROT 6.5   ALBUMIN 2.9*   BILITOT 0.8   ALKPHOS 66   AST 35   ALT 53*   ANIONGAP 9       Significant Imaging: I have reviewed all pertinent imaging results/findings within the past 24 hours.    Decision to admit

## 2023-11-21 NOTE — ASSESSMENT & PLAN NOTE
Patient presented with dysphagia, and CT revealed large, solid gastric mass (up to 8cm) with associated LAD and hepatic lesion concerning for malignancy with mets. Will consult GI and Heme-Onc for further workup and recommendations. Will likely benefit from EGD with biopsy at some point; will keep on clear liquid diet for now and NPO after midnight in case this could be performed tomorrow.   11/21 dysphagia with  CT revealed large, solid gastric mass (up to 8cm) with associated LAD and hepatic lesion concerning for malignancy with metstases.  GI and Heme-Onc consulted. 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 Extends  proximally into the mid-esophagus. Proximal extent  at 36 cm. Biopsied.                          - LA Grade C esophagitis.                          - Duodenitis.                          - Duodenal ulcers with a clean ulcer base (Frank                          Class III).    continue Full liquid diet. surgical oncology consulted .  protonix BID for 8 weeks   s/p surgical oncology eval -  not a candidate for upfront resection and would need medical oncology evaluation and treatment to precede any potential surgical intervention.  patient does not want a feeding tube right now as he can tolerate full liquids.  follow up in surgery clinic with  biopsy results.   11/22 CEA WNL. Heme/onc eval - No inpatient recommendations from Oncologic standpoint. Patient can follow up in clinic with GI Oncologist to follow pathology results.   tolerating  full liquid diet and protonix PO today. discharge home with surgical oncology and heme onc f/u

## 2023-11-21 NOTE — PT/OT/SLP EVAL
"Speech Language Pathology Evaluation  Bedside Swallow/ Discharge Summary     Patient Name:  David Benjamin Jr.   MRN:  [id number]  Admitting Diagnosis: Gastric mass    Recommendations:                 General Recommendations:   Follow up with GI, advance diet per GI  Diet recommendations:   (Soft solids when cleared for PO solids), Full liquids   Aspiration Precautions: Standard aspiration precautions   General Precautions: Standard,    Communication strategies:  none    Assessment:     David Benjamin Jr. is a 58 y.o. male with an SLP diagnosis of  largely functional swallow .  He presents with primarily esophageal stage deficits 2/2 gastric mass and characterized by globus sensation with intake of solid foods. Continue with GI intervention.       History:     History reviewed. No pertinent past medical history.    History reviewed. No pertinent surgical history.    Principal Problem:Gastric mass     Chief Complaint: No chief complaint on file.     HPI: David Benjamin Jr. is a 58 y.o. male with no significant past medical history who presents as a transfer from [facility] with dysphagia and weight loss.   He reports that he had back surgery in February, and has had some chronic back pain since that time.  Approximately 3 weeks ago, he establish with pain management and started tramadol.  Around this time, he noted difficulty swallowing which he initially thought was a reaction to the tramadol.  When taking solid foods, he feels that they get "clogged" in his throat, which increasingly has been unrelieved with drinking liquids.  He has no issues with drinking liquids and has been trying to take protein shakes as he can not tolerate solid foods.  When this occurs, he gets chest discomfort and abdominal bloating.  He reports night sweats, generalized malaise, and generalized weakness.  He is also had an approximately 30 lb unintentional weight loss in the past 3 weeks.  Due to his generalized " weakness and malaise, he presented to the ED today.  He is a former smoker of approximately 65 pack-years and quit in 2021. Denies heavy EtOH use. Remote family history of unknown CA in his aunts and uncles.  He is never had a colonoscopy.    Prior Intubation HX:  n/a    Modified Barium Swallow: n/a    Chest X-Rays: n/a    Prior diet: reg/thin until having difficulty with solids, puree/soft foods and thin liquids.    Subjective     Spoke with RN and MD prior to session. Pt back from EGD, awake/alert sitting upright in edge of bed. Pt reports some nausea s/p EGD.     Pain/Comfort:  Pain Rating 1: 0/10  Pain Rating Post-Intervention 1: 0/10    Respiratory Status: Room air    Objective:     Oral Musculature Evaluation  Oral Musculature: WFL  Dentition: upper and lower dentures  Secretion Management: adequate  Mucosal Quality: adequate  Mandibular Strength and Mobility: WFL  Oral Labial Strength and Mobility: WFL  Lingual Strength and Mobility: WFL  Velar Elevation: WFL  Buccal Strength and Mobility: WFL  Volitional Cough: present  Volitional Swallow: present  Voice Prior to PO Intake: clear    Bedside Swallow Eval:   Consistencies Assessed:  Thin liquids 4 oz water  Puree tsp puree x5      Oral Phase:   WFL    Pharyngeal Phase:   no overt clinical signs/symptoms of aspiration  no overt clinical signs/symptoms of pharyngeal dysphagia  Mild globus sensation w/ puree, easily cleared     Compensatory Strategies  Effortful swallow, double swallow w/ puree     Treatment: Pt complains of globus sensation at the level of sternal notch with associated chest pain and nausea with intake of solid foods. He reports occasional regurgitation to clear globus sensation. He reports weight loss and decreased appetite since presence of gastric mass.  No coughing/choking reported with PO intake.   Pt able to self present and tolerate puree solids and thin liquids. Recommend he continue with a full liquids diet at this time, advance per GI.  Discussed recommendation for softer foods to reduce globus sensation when swallowing and esophageal dysphagia strategies. Education provided re: role of SLP, diet recs, swallow precs, s/s aspiration and POC.  Pt verbalized understanding and agreement. No acute ST needs.    Plan:     Plan of Care reviewed with:  patient   SLP Follow-Up:  No       Discharge recommendations:  No Therapy Indicated   Barriers to Discharge:  None    Time Tracking:     SLP Treatment Date:   11/21/23  Speech Start Time:  1127  Speech Stop Time:  1142     Speech Total Time (min):  15 min    Billable Minutes: Eval Swallow and Oral Function 7 and Self Care/Home Management Training 8    11/21/2023

## 2023-11-21 NOTE — PROVATION PATIENT INSTRUCTIONS
Discharge Summary/Instructions after an Endoscopic Procedure  Patient Name: David Benjamin  Patient MRN: [id number]  Patient YOB: 1965 Tuesday, November 21, 2023  Princess Voss MD  Dear patient,  As a result of recent federal legislation (The Federal Cures Act), you may   receive lab or pathology results from your procedure in your [facility]   account before your physician is able to contact you. Your physician or   their representative will relay the results to you with their   recommendations at their soonest availability.  Thank you,  RESTRICTIONS:  During your procedure today, you received medications for sedation.  These   medications may affect your judgment, balance and coordination.  Therefore,   for 24 hours, you have the following restrictions:   - DO NOT drive a car, operate machinery, make legal/financial decisions,   sign important papers or drink alcohol.    ACTIVITY:  Today: no heavy lifting, straining or running due to procedural   sedation/anesthesia.  The following day: return to full activity including work.  DIET:  Eat and drink normally unless instructed otherwise.     TREATMENT FOR COMMON SIDE EFFECTS:  - Mild abdominal pain, nausea, belching, bloating or excessive gas:  rest,   eat lightly and use a heating pad.  - Sore Throat: treat with throat lozenges and/or gargle with warm salt   water.  - Because air was used during the procedure, expelling large amounts of air   from your rectum or belching is normal.  - If a bowel prep was taken, you may not have a bowel movement for 1-3 days.    This is normal.  SYMPTOMS TO WATCH FOR AND REPORT TO YOUR PHYSICIAN:  1. Abdominal pain or bloating, other than gas cramps.  2. Chest pain.  3. Back pain.  4. Signs of infection such as: chills or fever occurring within 24 hours   after the procedure.  5. Rectal bleeding, which would show as bright red, maroon, or black stools.   (A tablespoon of blood from the rectum is not serious, especially  if   hemorrhoids are present.)  6. Vomiting.  7. Weakness or dizziness.  GO DIRECTLY TO THE NEAREST EMERGENCY ROOM IF YOU HAVE ANY OF THE FOLLOWING:      Difficulty breathing              Chills and/or fever over 101 F   Persistent vomiting and/or vomiting blood   Severe abdominal pain   Severe chest pain   Black, tarry stools   Bleeding- more than one tablespoon   Any other symptom or condition that you feel may need urgent attention  Your doctor recommends these additional instructions:  If any biopsies were taken, your doctors clinic will contact you in 1 to 2   weeks with any results.  - Await pathology results. Sent as rush.  - Return patient to [facility] for ongoing care.   - Full liquid diet.   - Continue present medications.   - Consult surgical oncology.  For questions, problems or results please call your physician - Princess Voss MD at Work:  [phone].  [facility], EMERGENCY ROOM PHONE NUMBER: [phone]  IF A COMPLICATION OR EMERGENCY SITUATION ARISES AND YOU ARE UNABLE TO REACH   YOUR PHYSICIAN - GO DIRECTLY TO THE EMERGENCY ROOM.  Princess Voss MD  11/21/2023 10:37:20 AM  This report has been verified and signed electronically.  Dear patient,  As a result of recent federal legislation (The Federal Cures Act), you may   receive lab or pathology results from your procedure in your [facility]   account before your physician is able to contact you. Your physician or   their representative will relay the results to you with their   recommendations at their soonest availability.  Thank you,  PROVATION

## 2023-11-21 NOTE — CONSULTS
[facility] ([facility])  General Surgery  Consult Note    Patient Name: David Benjamin Jr.  MRN: [id number]  Code Status: Full Code  Admission Date: 11/20/2023  Hospital Length of Stay: 1 days  Attending Physician: Jose Orellana MD  Primary Care Provider: Ju Primary Doctor    Patient information was obtained from patient, past medical records, and ER records.     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 He reports dysphagia beginning approximately 3 weeks ago soon after starting tramadol prescription for back pain. He initially attributed his dysphagia to this and stopped the medication. He later stopped this medication, however his dysphagia continued to progressively worsen. He has near total intolerance to solid foods and reports this food gets stuck and ultimately has to be regurgitated. He has lost about 25 pounds in the last month. He presented to outside ED with these complaints and underwent CT chest/abdomen/pelvis imaging which revealed 11 cm GEJ mass with surrounding sherly-aortic and sherly-gastric lymphadenopathy. He underwent EGD today which demonstrates mass, possibly arising from cardia and extending into the esophagus. Surgical oncology consulted for gastric mass.       Surgical history: anterior approach spinal surgery February 02/2023    Current Facility-Administered Medications on File Prior to Encounter   Medication    [COMPLETED] iohexoL (OMNIPAQUE 350) injection 100 mL    [COMPLETED] lactated ringers bolus 1,000 mL     No current outpatient medications on file prior to encounter.        Review of patient's allergies indicates:   Allergen Reactions    Nsaids (non-steroidal anti-inflammatory drug) Hives     With ASA and Ibuprofen       History reviewed. No pertinent past medical history.  History reviewed. No pertinent surgical history.  Family History    None       Tobacco Use    Smoking status: Former     Types: Cigarettes    Smokeless tobacco: Not on file   Substance and Sexual Activity    Alcohol use: Not Currently    Drug use: Not on file    Sexual activity: Not on file     Review of Systems   Constitutional:  Positive for unexpected weight change. Negative for activity change, appetite change, fatigue and fever.   HENT:  Positive for trouble swallowing.    Respiratory: Negative.     Gastrointestinal:  Positive for abdominal pain, nausea and vomiting. Negative for abdominal distention, constipation and diarrhea.   Genitourinary: Negative.    Musculoskeletal:  Positive for back pain.   Skin: Negative.    Neurological: Negative.    Psychiatric/Behavioral: Negative.     All other systems reviewed and are negative.    Objective:     Vital Signs (Most Recent):  Temp: 97.7 °F (36.5 °C) (11/21/23 1111)  Pulse: 88 (11/21/23 1111)  Resp: 16 (11/21/23 1111)  BP: 121/60 (11/21/23 1111)  SpO2: 97 % (11/21/23 1111) Vital Signs (24h Range):  Temp:  [97.6 °F (36.4 °C)-98.6 °F (37 °C)] 97.7 °F (36.5 °C)  Pulse:  [] 88  Resp:  [15-22] 16  SpO2:  [92 %-100 %] 97 %  BP: (104-127)/(58-77) 121/60     Weight: 103 kg (227 lb)  Body mass index is 29.95 kg/m².     Physical Exam  Vitals and nursing note reviewed.   Constitutional:       Appearance: Normal appearance. He is obese.   HENT:      Head: Normocephalic and atraumatic.   Cardiovascular:      Rate and Rhythm: Normal rate and regular rhythm.   Pulmonary:      Effort: Pulmonary effort is normal. No respiratory distress.   Abdominal:      General: Abdomen is flat.      Palpations: Abdomen is soft.      Comments: Mild epigastric tenderness     Musculoskeletal:      Right lower leg: No edema.      Left lower leg: No edema.   Skin:     General: Skin is warm and dry.   Neurological:      General: No focal deficit present.      Mental Status: He is alert and oriented to person, place, and time.   Psychiatric:         Mood and Affect: Mood normal.         Behavior: Behavior normal.            I have reviewed all pertinent lab results within the past 24 hours.    Significant Diagnostics:  I have reviewed all pertinent imaging results/findings within the past 24 hours.  CT abd/pelvis 11/20/2023  Impression:     1. A large solid mass which appears to be at least partially contiguous with the lesser curvature of the stomach, raising the question of gastric malignancy.  2. There are multiple enlarged upper abdominal and periaortic lymph nodes, consistent with metastatic disease.  Given the extent of michael enlargement, these findings raise the question of lymphoma with involvement of the stomach.  3. An oval-shaped hypodense lesion in the right hepatic lobe, too small to further characterize.  This could reflect a small cyst or metastatic lesion.      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 Extends proximally into the mid-esophagus. Proximal extent at 36 cm. Biopsied.                          - LA Grade C esophagitis.                          - Duodenitis.                          - Duodenal ulcers with a clean ulcer base (Frank Class III).   Recommendation:          - Await pathology results. Sent as rush.   - Return patient to [facility] for [facility] care.                          - Full liquid diet.                          - Continue present medications.                          - Consult surgical oncology.   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 He is not a candidate for upfront resection and would need medical oncology evaluation and treatment to precede any potential surgical intervention.     - Await final pathology results  - Please obtain CEA. He will likely need PET scan as well, however this can be done on outpatient basis following final pathology.  - Full liquid diet as tolerated. If he can tolerate full liquids and maintain adequate caloric intake with this, can DC with medical oncology referral and complete his work up outpatient. If he is unable to tolerate adequate caloric intake by mouth, would consider J tube placement, potentially could add on this Friday if need be. Would place port at that time.           VTE Risk Mitigation (From admission, onward)           Ordered     IP VTE LOW RISK PATIENT  Once         11/20/23 2020     Place sequential compression device  Until discontinued         11/20/23 2020     Reason for No Pharmacological VTE Prophylaxis  Once        Question:  Reasons:  Answer:  Risk of Bleeding    11/20/23 2020                    Thank you for your consult. I will follow-up with patient. Please contact us if you have any additional questions.    Adelia Pretty MD  General Surgery  [facility] - Oncology ([facility])

## 2023-11-21 NOTE — ASSESSMENT & PLAN NOTE
58 year old male with former tobacco and alcohol use presenting with a 3-week history of abdominal pain, weight loss, fatigue and bloating. CTAP concerning for gastric mass with upper abdominal and periaortic lymph nodes, likely representative of metastatic disease. No blood thinners.     Recommendations:  - Keep NPO, plan for EGD today.

## 2023-11-21 NOTE — HPI
"David Benjamin is a 58 year old male for whom GI is consulted for gastric mass. He has a medical history signifciant for former tobacco and alcohol use. History provided by patient and chart review.      Pt states over the last few weeks he has been experiencing diffuse abdominal pain with associated bloating. Associated symptoms include a weight loss of at least 25 lbs since onset of symptoms as well as nausea. On arrival, he was afebrile and HDS. Laboratory workup notable for normocytic anemia with hgb 13.6, hypoalbuminemia 2.9 and ALT 54. CTAP showed, "A large solid mass which appears to be at least partially contiguous with the lesser curvature of the stomach, raising the question of gastric malignancy.There are multiple enlarged upper abdominal and periaortic lymph nodes, consistent with metastatic disease.  Given the extent of michael enlargement, these findings raise the question of lymphoma with involvement of the stomach." No prior upper endoscopies.     "

## 2023-11-21 NOTE — H&P
"[facility] - Oncology ([facility] Medicine  History & Physical    Patient Name: David Benjamin Jr.  MRN: [id number]  Patient Class: IP- Inpatient  Admission Date: 11/20/2023  Attending Physician: Toni Jeronimo MD   Primary Care Provider: Ju Primary Doctor         Patient information was obtained from patient, past medical records, and ER records.     Subjective:     Principal Problem:Gastric mass    Chief Complaint: No chief complaint on file.       HPI: David Benjamin Jr. is a 58 y.o. male with no significant past medical history who presents as a transfer from [facility] with dysphagia and weight loss.     He reports that he had back surgery in February, and has had some chronic back pain since that time.  Approximately 3 weeks ago, he establish with pain management and started tramadol.  Around this time, he noted difficulty swallowing which he initially thought was a reaction to the tramadol.  When taking solid foods, he feels that they get "clogged" in his throat, which increasingly has been unrelieved with drinking liquids.  He has no issues with drinking liquids and has been trying to take protein shakes as he can not tolerate solid foods.  When this occurs, he gets chest discomfort and abdominal bloating.  He reports night sweats, generalized malaise, and generalized weakness.  He is also had an approximately 30 lb unintentional weight loss in the past 3 weeks.  Due to his generalized weakness and malaise, he presented to the ED today.      He is a former smoker of approximately 65 pack-years and quit in 2021. Denies heavy EtOH use. Remote family history of unknown CA in his aunts and uncles.  He is never had a colonoscopy.          No past medical history on file.    No past surgical history on file.    Review of patient's allergies indicates:   Allergen Reactions    Advil [ibuprofen]     Penicillins        Current Facility-Administered Medications on File Prior to Encounter "   Medication    [COMPLETED] iohexoL (OMNIPAQUE 350) injection 100 mL    [COMPLETED] lactated ringers bolus 1,000 mL    [COMPLETED] ondansetron injection 4 mg     No current outpatient medications on file prior to encounter.     Family History    None       Tobacco Use    Smoking status: Not on file    Smokeless tobacco: Not on file   Substance and Sexual Activity    Alcohol use: Not on file    Drug use: Not on file    Sexual activity: Not on file     Review of Systems   Constitutional:         All pertinent other ROS noted in HPI   All other systems reviewed and are negative.    Objective:     Vital Signs (Most Recent):  Temp: 97.9 °F (36.6 °C) (11/20/23 1955)  Pulse: 101 (11/20/23 1955)  Resp: 18 (11/20/23 1955)  BP: 112/70 (11/20/23 1955)  SpO2: (!) 94 % (11/20/23 1955) Vital Signs (24h Range):  Temp:  [97.5 °F (36.4 °C)-97.9 °F (36.6 °C)] 97.9 °F (36.6 °C)  Pulse:  [101-114] 101  Resp:  [18-20] 18  SpO2:  [94 %-95 %] 94 %  BP: (104-112)/(65-76) 112/70        There is no height or weight on file to calculate BMI.     Physical Exam  Vitals and nursing note reviewed.   Constitutional:       General: He is not in acute distress.     Appearance: He is well-developed. He is obese. He is not ill-appearing or diaphoretic.   HENT:      Head: Normocephalic and atraumatic.   Eyes:      General: No scleral icterus.     Conjunctiva/sclera: Conjunctivae normal.   Neck:      Vascular: No JVD.   Cardiovascular:      Rate and Rhythm: Normal rate and regular rhythm.   Pulmonary:      Effort: Pulmonary effort is normal. No respiratory distress.   Abdominal:      General: There is no distension.      Tenderness: There is no abdominal tenderness. There is no guarding.   Skin:     Coloration: Skin is not jaundiced or pale.   Neurological:      Mental Status: He is alert and oriented to person, place, and time.      Motor: No abnormal muscle tone.   Psychiatric:         Mood and Affect: Mood normal.         Behavior: Behavior normal.                 Significant Labs: All pertinent labs within the past 24 hours have been reviewed.  CBC:   Recent Labs   Lab 11/20/23  1312   WBC 11.02   HGB 13.6*   HCT 42.4        CMP:   Recent Labs   Lab 11/20/23  1334      K 3.8      CO2 26   *   BUN 11   CREATININE 0.9   CALCIUM 8.8   PROT 6.5   ALBUMIN 2.9*   BILITOT 0.8   ALKPHOS 66   AST 35   ALT 53*   ANIONGAP 9       Significant Imaging: I have reviewed all pertinent imaging results/findings within the past 24 hours.    Decision to admit     Assessment/Plan:     * Gastric mass  Patient presented with dysphagia, and CT revealed large, solid gastric mass (up to 8cm) with associated LAD and hepatic lesion concerning for malignancy with mets. Will consult GI and Heme-Onc for further workup and recommendations. Will likely benefit from EGD with biopsy at some point; will keep on clear liquid diet for now and NPO after midnight in case this could be performed tomorrow.       Other dysphagia  Suspect secondary to large gastric mass. Appreciate GI recommendations. Continue liquid diet for now.       Unintentional weight loss  Reports unintentional weight loss of around 30lb in 3 weeks. Suspect secondary to dysphagia and likely malignancy. Workup as above.  Consult RD for supplement recommendations.    Normocytic anemia  Mild, without indication for transfusion. Recommend outpatient PCP evaluation.       VTE Risk Mitigation (From admission, onward)           Ordered     IP VTE LOW RISK PATIENT  Once         11/20/23 2020     Place sequential compression device  Until discontinued         11/20/23 2020     Reason for No Pharmacological VTE Prophylaxis  Once        Question:  Reasons:  Answer:  Risk of Bleeding    11/20/23 2020                     Advance Care Planning   Patient is Full Code on discussion with him.  Patient's surrogate decision maker is his wife.                  Alvin Morrison MD  Department of Hospital Medicine  [facility]  - Oncology (Hospital)    N/A  Family history non-contributory to current problem   Pertinent information:

## 2023-11-21 NOTE — PROGRESS NOTES
"Carlos Poole - Oncology ([facility])  [facility] Medicine  Progress Note    Patient Name: David Benjamin Jr.  MRN: [id number]  Patient Class: IP- Inpatient   Admission Date: 11/20/2023  Length of Stay: 1 days  Attending Physician: Jose Orellana MD  Primary Care Provider: Ju, Primary Doctor        Subjective:     Principal Problem:Mass of gastroesophageal junction        HPI:  David Benjamin Jr. is a 58 y.o. male with no significant past medical history who presents as a transfer from [facility] with dysphagia and weight loss.     He reports that he had back surgery in February, and has had some chronic back pain since that time.  Approximately 3 weeks ago, he establish with pain management and started tramadol.  Around this time, he noted difficulty swallowing which he initially thought was a reaction to the tramadol.  When taking solid foods, he feels that they get "clogged" in his throat, which increasingly has been unrelieved with drinking liquids.  He has no issues with drinking liquids and has been trying to take protein shakes as he can not tolerate solid foods.  When this occurs, he gets chest discomfort and abdominal bloating.  He reports night sweats, generalized malaise, and generalized weakness.  He is also had an approximately 30 lb unintentional weight loss in the past 3 weeks.  Due to his generalized weakness and malaise, he presented to the ED today.      He is a former smoker of approximately 65 pack-years and quit in 2021. Denies heavy EtOH use. Remote family history of unknown CA in his aunts and uncles.  He is never had a colonoscopy.          Overview/Hospital Course:  11/21 dysphagia with  CT revealed large, solid gastric mass (up to 8cm) with associated LAD and hepatic lesion concerning for malignancy with metstases.  GI and Heme-Onc consulted. 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 Extends  proximally into the " mid-esophagus. Proximal extent  at 36 cm. Biopsied.                          - LA Grade C esophagitis.                          - Duodenitis.                          - Duodenal ulcers with a clean ulcer base (Frank                          Class III).    continue Full liquid diet. surgical oncology consulted . protonix BID for 8 weeks . surgical oncology eval -  not a candidate for upfront resection and would need medical oncology evaluation and treatment to precede any potential surgical intervention.  Dietary consulted for calorie count. started on boost plus TID.  if unable to keep adequate oral intake with full liquids, General surgery  plans for J tube placement and  port placement on friday         Review of Systems:   Pain scale:   Constitutional:  fever,  chills, headache, vision loss, hearing loss, weight loss, Generalized weakness, falls, loss of smell, loss of taste, poor appetite,  sore throat. weight loss   Respiratory: cough, shortness of breath.   Cardiovascular: chest pain, dizziness, palpitations, orthopnea, swelling of feet, syncope  Gastrointestinal: nausea, vomiting, abdominal pain, diarrhea, black stool,  blood in stool, change in bowel habits, constipation, dysphagia  Genitourinary: hematuria, dysuria, urgency, frequency  Integument/Breast: rash,  pruritis  Hematologic/Lymphatic: easy bruising, lymphadenopathy  Musculoskeletal: arthralgias , myalgias, back pain- chronic, neck pain, knee pain  Neurological: confusion, seizures, tremors, slurred speech  Behavioral/Psych:  depression, anxiety, auditory or visual hallucinations     OBJECTIVE:     Physical Exam:  Body mass index is 29.95 kg/m².    Constitutional: Appears well-developed and well-nourished.   Head: Normocephalic and atraumatic.   Neck: Normal range of motion. Neck supple.   Cardiovascular: Normal heart rate.  Regular heart rhythm.  Pulmonary/Chest: Effort normal.   Abdominal: No distension.  No tenderness  Musculoskeletal: Normal  "range of motion. No edema.   Neurological: Alert and oriented to person, place, and time.   Skin: Skin is warm and dry.   Psychiatric: Normal mood and affect. Behavior is normal.                  Vital Signs  Temp: 97.7 °F (36.5 °C) (11/21/23 1111)  Pulse: 88 (11/21/23 1111)  Resp: 16 (11/21/23 1111)  BP: 121/60 (11/21/23 1111)  SpO2: 97 % (11/21/23 1111)     24 Hour VS Range    Temp:  [97.6 °F (36.4 °C)-98.6 °F (37 °C)]   Pulse:  []   Resp:  [15-22]   BP: (104-127)/(58-77)   SpO2:  [92 %-100 %]     Intake/Output Summary (Last 24 hours) at 11/21/2023 1429  Last data filed at 11/21/2023 1019  Gross per 24 hour   Intake 1015.75 ml   Output 350 ml   Net 665.75 ml         I/O This Shift:  I/O this shift:  In: 440 [I.V.:190; IV Piggyback:250]  Out: -     Wt Readings from Last 3 Encounters:   11/20/23 103 kg (227 lb)   11/20/23 103 kg (227 lb)       I have personally reviewed the vitals and recorded Intake/Output     Laboratory/Diagnostic Data:    CBC/Anemia Labs: Coags:    Recent Labs   Lab 11/20/23  1312 11/21/23  1320   WBC 11.02 10.63   HGB 13.6* 12.3*   HCT 42.4 38.3*    233   MCV 88 87   RDW 13.4 13.5    No results for input(s): "PT", "INR", "APTT" in the last 168 hours.     Chemistries: ABG:   Recent Labs   Lab 11/20/23  1334 11/21/23  1320    139   K 3.8 3.8    106   CO2 26 24   BUN 11 12   CREATININE 0.9 0.7   CALCIUM 8.8 8.6*   PROT 6.5 6.0   BILITOT 0.8 0.8   ALKPHOS 66 64   ALT 53* 58*   AST 35 35   MG 2.2 1.8    No results for input(s): "PH", "PCO2", "PO2", "HCO3", "POCSATURATED", "BE" in the last 168 hours.     POCT Glucose: HbA1c:    No results for input(s): "POCTGLUCOSE" in the last 168 hours. No results found for: "HGBA1C"     Cardiac Enzymes: Ejection Fractions:    No results for input(s): "CPK", "CPKMB", "MB", "TROPONINI" in the last 72 hours. No results found for: "EF"       No results for input(s): "COLORU", "APPEARANCEUA", "PHUR", "SPECGRAV", "PROTEINUA", "GLUCUA", " ""KETONESU", "BILIRUBINUA", "OCCULTUA", "NITRITE", "UROBILINOGEN", "LEUKOCYTESUR", "RBCUA", "WBCUA", "BACTERIA", "SQUAMEPITHEL", "HYALINECASTS" in the last 48 hours.    Invalid input(s): "WRIGHTSUR"    No results found for: "PROCAL", "LACTATE"  No results found for: "BNP"  No results found for: "CRP", "SEDRATE"  No results found for: "DDIMER"  No results found for: "FERRITIN"  No results found for: "LDH"  No results found for: "TROPONINI", "CPK"  No results found for this or any previous visit.  No results found for: "[id number]"    Microbiology labs for the last week  Microbiology Results (last 7 days)       ** No results found for the last 168 hours. **            Reviewed and noted in plan where applicable- Please see chart for full lab data.    Lines/Drains:       Peripheral IV - Single Lumen 11/20/23 1300 20 G Left;Posterior Hand (Active)   Site Assessment Clean;Intact;Dry 11/21/23 0400   Extremity Assessment Distal to IV No abnormal discoloration;No redness;No swelling;No warmth 11/20/23 2128   Line Status Infusing 11/21/23 0400   Dressing Status Clean;Dry;Intact 11/21/23 0400   Dressing Intervention Integrity maintained 11/21/23 0400   Dressing Change Due 11/20/23 11/20/23 2128   Site Change Due 11/24/23 11/20/23 2128   Reason Not Rotated Not due 11/20/23 2128   Number of days: 0       Imaging      No results found for this or any previous visit.      CT Abdomen Pelvis With IV Contrast  Narrative: EXAMINATION:  CT ABDOMEN PELVIS WITH IV CONTRAST    CLINICAL HISTORY:  Dysphagia, 30 lb weight loss in 3 weeks;    TECHNIQUE:  Axial CT images were obtained. Iterative reconstruction technique was used. CT/cardiac nuclear exam/s in prior 12 months: 0.    COMPARISON:  CT chest with IV contrast 11/20/2023.    FINDINGS:  In the right hepatic lobe, a 1.3 cm oval-shaped hypodense lesion, too small to further characterize (image 41 of series 4 axial).    No gallstones.  The spleen, pancreas, adrenal glands and kidneys " demonstrate no abnormality.    There is a moderate to large size hiatal hernia containing the proximal stomach.  There are multiple solid masses in the upper abdomen including along the lesser curvature of the stomach.  Some of these masses appear to be contiguous with the stomach (for example, image 32 of series 4 axial).    Some of these masses appear separate and others appear contiguous.  The largest component of the mass appears to measure approximately 11 x 8 cm (image 33 of series 4 axial).  This measures approximately 8 cm in craniocaudal dimension (image 75 of series 8 coronal).    There are no dilated loops of bowel.  The appendix is not visualized.  No pericecal inflammatory change.  Small amount of free fluid in the pelvis.  No free air.    There are urinary bladder diverticula.    No aortic aneurysm.    Multiple enlarged upper abdominal and periaortic lymph nodes, some of which appear contiguous.  A large michael mass in the upper abdomen measures approximately 7 x 5.5 cm (image 46 of series 4 axial).    Visualized lung bases will be discussed in the chest CT report of same day.  There is no osseous abnormality.  Impression: 1. A large solid mass which appears to be at least partially contiguous with the lesser curvature of the stomach, raising the question of gastric malignancy.  2. There are multiple enlarged upper abdominal and periaortic lymph nodes, consistent with metastatic disease.  Given the extent of michael enlargement, these findings raise the question of lymphoma with involvement of the stomach.  3. An oval-shaped hypodense lesion in the right hepatic lobe, too small to further characterize.  This could reflect a small cyst or metastatic lesion.    Electronically signed by: Travis Reyez MD  Date:    11/20/2023  Time:    14:44  CT Chest With Contrast  Narrative: EXAMINATION:  CT CHEST WITH CONTRAST    CLINICAL HISTORY:  Dysphagia, 30 lb weight loss over 3 weeks;    TECHNIQUE:  Axial CT  images were obtained. Iterative reconstruction technique was used.  CT/cardiac nuclear exam/s in prior 12 months: 0.    FINDINGS:  Breathing motion artifact on some images.    There is mucosal thickening in the airways bilaterally.    Mild pulmonary emphysema.  There is no pleural or pericardial fluid.  No aortic aneurysm.  No hilar or mediastinal lymph node enlargement.  There is a moderate to large size hiatal hernia containing the proximal stomach.  The visualized upper abdomen will be discussed in the abdomen pelvis CT report of same day.  No axillary lymph node enlargement.  No osseous abnormality.  Impression: 1. Mild pulmonary emphysema.  2. Bronchitis.  3. Moderate to large size hiatal hernia containing the proximal stomach.    Electronically signed by: Travis Reyez MD  Date:    11/20/2023  Time:    14:35      Labs, Imaging, EKG and Diagnostic results from 11/21/2023 were reviewed.    Medications:  Medication list was reviewed and changes noted under Assessment/Plan.  Current Facility-Administered Medications on File Prior to Encounter   Medication Dose Route Frequency Provider Last Rate Last Admin    [COMPLETED] lactated ringers bolus 1,000 mL  1,000 mL Intravenous ED 1 Time Noe, Dakotah MAGAÑA MD   Stopped at 11/20/23 1445     No current outpatient medications on file prior to encounter.     Scheduled Medications:  pantoprazole, 40 mg, Oral, BID AC      PRN: acetaminophen, acetaminophen, aluminum-magnesium hydroxide-simethicone, melatonin, naloxone, ondansetron, polyethylene glycol, simethicone, sodium chloride 0.9%  Infusions:    lactated ringers 75 mL/hr at 11/21/23 1116     Estimated Creatinine Clearance: 141.4 mL/min (based on SCr of 0.7 mg/dL).             Assessment/Plan:      * Mass of gastroesophageal junction  Patient presented with dysphagia, and CT revealed large, solid gastric mass (up to 8cm) with associated LAD and hepatic lesion concerning for malignancy with mets. Will consult GI and  Heme-Onc for further workup and recommendations. Will likely benefit from EGD with biopsy at some point; will keep on clear liquid diet for now and NPO after midnight in case this could be performed tomorrow.   11/21 dysphagia with  CT revealed large, solid gastric mass (up to 8cm) with associated LAD and hepatic lesion concerning for malignancy with metstases.  GI and Heme-Onc consulted. 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 Extends  proximally into the mid-esophagus. Proximal extent  at 36 cm. Biopsied.                          - LA Grade C esophagitis.                          - Duodenitis.                          - Duodenal ulcers with a clean ulcer base (Frank                          Class III).    continue Full liquid diet. surgical oncology consulted .  protonix BID for 8 weeks   s/p surgical oncology eval -  not a candidate for upfront resection and would need medical oncology evaluation and treatment to precede any potential surgical intervention.        Unintentional weight loss  Reports unintentional weight loss of around 30lb in 3 weeks. Suspect secondary to dysphagia and likely malignancy. Workup as above.  Consult RD for supplement recommendations.  11/21  Dietary consulted for calorie count. started on boost plus TID.  if unable to keep adequate oral intake with full liquids, General surgery  plans for J tube placement and  port placement on friday     Other dysphagia  Suspect secondary to large gastric mass. Appreciate GI recommendations. Continue liquid diet for now.       Normocytic anemia  Mild, without indication for transfusion. Recommend outpatient PCP evaluation.     Patient's with Normocytic anemia.. Hemoglobin stable. Etiology likely due to chronic disease .  Current CBC reviewed-    Recent Labs   Lab 11/20/23  1312   HGB 13.6*         Component Value Date/Time    MCV 88 11/20/2023 1312    RDW 13.4 11/20/2023 1312     Monitor  CBC and transfuse if H/H drops below 7/21.        VTE Risk Mitigation (From admission, onward)           Ordered     IP VTE LOW RISK PATIENT  Once         11/20/23 2020     Place sequential compression device  Until discontinued         11/20/23 2020     Reason for No Pharmacological VTE Prophylaxis  Once        Question:  Reasons:  Answer:  Risk of Bleeding    11/20/23 2020                    Discharge Planning   VERA: 11/23/2023     Code Status: Full Code   Is the patient medically ready for discharge?: No    Reason for patient still in hospital (select all that apply): Treatment                     Toni Jeronimo MD  Department of Hospital Medicine   [facility] ([facility])

## 2023-11-21 NOTE — PLAN OF CARE
Patient AAOx4. POC reviewed with patient; understanding verbalized. EGD completed. Pt on full liquid diet. IV protonix administered. LR @ 75mL/hr. Pt. with nonskid footwear on, bed in lowest position, and locked with bed rails up x2.  Pt. instructed for assistance as needed. Pt. has call light and personal items within reach. Patient ambulates in room independently. Voiding spontaneously. No BM this shift. VSS and afebrile this shift. All questions and concerns addressed at this time.

## 2023-11-21 NOTE — HPI
58 year old M w/ 43 pack year history and chronic back pain s/p anterior approach spinal surgery this past February presenting with dysphagia, unintended weight loss, and newly found gastroesophageal junction mass. He reports dysphagia beginning approximately 3 weeks ago soon after starting tramadol prescription for back pain. He initially attributed his dysphagia to this and stopped the medication. He later stopped this medication, however his dysphagia continued to progressively worsen. He has near total intolerance to solid foods and reports this food gets stuck and ultimately has to be regurgitated. He has lost about 25 pounds in the last month. He presented to outside ED with these complaints and underwent CT chest/abdomen/pelvis imaging which revealed 11 cm GEJ mass with surrounding sherly-aortic and sherly-gastric lymphadenopathy. He underwent EGD today which demonstrates mass, possibly arising from cardia and extending into the esophagus. Surgical oncology consulted for gastric mass.       Surgical history: anterior approach spinal surgery February 02/2023

## 2023-11-21 NOTE — H&P
Short Stay Endoscopy History and Physical    PCP - No, Primary Doctor  Referring Physician - Toni Jeronimo MD  [address] [place] [address]    Procedure - EGD  ASA - per anesthesia  Mallampati - per anesthesia  History of Anesthesia problems - no  Family history Anesthesia problems -  no   Plan of anesthesia - General    HPI  58 y.o. male  Reason for procedure:  Gastric mass [K31.89]      ROS:  Constitutional: No fevers, chills, No weight loss  CV: No chest pain  Pulm: No cough, No shortness of breath  GI: see HPI    Medical History:  has no past medical history on file.    Surgical History:  has no past surgical history on file.    Family History: family history is not on file..    Social History:  reports that he has quit smoking. His smoking use included cigarettes. He does not have any smokeless tobacco history on file. He reports that he does not currently use alcohol.    Review of patient's allergies indicates:   Allergen Reactions    Nsaids (non-steroidal anti-inflammatory drug) Hives     With ASA and Ibuprofen       Medications:   No medications prior to admission.       Physical Exam:    Vital Signs:   Vitals:    11/21/23 0914   BP: 119/77   Pulse: 95   Resp: 16   Temp: 98.1 °F (36.7 °C)       General Appearance: Well appearing in no acute distress  Abdomen: Soft, non tender, non distended with normal bowel sounds, no masses      Labs:  Lab Results   Component Value Date    WBC 11.02 11/20/2023    HGB 13.6 (L) 11/20/2023    HCT 42.4 11/20/2023     11/20/2023    ALT 53 (H) 11/20/2023    AST 35 11/20/2023     11/20/2023    K 3.8 11/20/2023     11/20/2023    CREATININE 0.9 11/20/2023    BUN 11 11/20/2023    CO2 26 11/20/2023    INR 0.9 12/19/2022       I have explained the risks and benefits of this endoscopic procedure to the patient including but not limited to bleeding, inflammation, infection, perforation, and death.    Assessment/Plan:     Gastric mass     - Proceed  with EGD     Princess Voss MD  Gastroenterology   [facility]

## 2023-11-21 NOTE — TRANSFER OF CARE
"Anesthesia Transfer of Care Note    Patient: David Benjamin Jr.    Procedure(s) Performed: Procedure(s) (LRB):  EGD (ESOPHAGOGASTRODUODENOSCOPY) (N/A)    Patient location: PACU    Anesthesia Type: general    Transport from OR: Transported from OR on room air with adequate spontaneous ventilation    Post pain: adequate analgesia    Post assessment: no apparent anesthetic complications and tolerated procedure well    Post vital signs: stable    Level of consciousness: awake, alert and oriented    Nausea/Vomiting: no nausea/vomiting    Complications: none    Transfer of care protocol was followed      Last vitals: Visit Vitals  /60 (BP Location: Right arm, Patient Position: Lying)   Pulse 93   Temp 36.5 °C (97.7 °F) (Temporal)   Resp 19   Ht 6' 1" (1.854 m)   Wt 103 kg (227 lb)   SpO2 96%   BMI 29.95 kg/m²     "

## 2023-11-21 NOTE — PLAN OF CARE
POC reviewed with patient; understanding verbalized. LR @ 75 mL/hr. Pt currently NPO for potential EGD this AM. Pt awaiting consults to Med Onc and GI. Pt voids clear yellow urine via urinal. Pt. with nonskid footwear on, bed in lowest position, and locked with bed rails up x2.  Pt. instructed to call prior to getting OOB.  Pt. has call light and personal items within reach. Patient ambulates in room independently. VSS and afebrile this shift. All questions and concerns addressed at this time.

## 2023-11-21 NOTE — SUBJECTIVE & OBJECTIVE
Current Facility-Administered Medications on File Prior to Encounter   Medication    [COMPLETED] iohexoL (OMNIPAQUE 350) injection 100 mL    [COMPLETED] lactated ringers bolus 1,000 mL     No current outpatient medications on file prior to encounter.       Review of patient's allergies indicates:   Allergen Reactions    Nsaids (non-steroidal anti-inflammatory drug) Hives     With ASA and Ibuprofen       History reviewed. No pertinent past medical history.  History reviewed. No pertinent surgical history.  Family History    None       Tobacco Use    Smoking status: Former     Types: Cigarettes    Smokeless tobacco: Not on file   Substance and Sexual Activity    Alcohol use: Not Currently    Drug use: Not on file    Sexual activity: Not on file     Review of Systems   Constitutional:  Positive for unexpected weight change. Negative for activity change, appetite change, fatigue and fever.   HENT:  Positive for trouble swallowing.    Respiratory: Negative.     Gastrointestinal:  Positive for abdominal pain, nausea and vomiting. Negative for abdominal distention, constipation and diarrhea.   Genitourinary: Negative.    Musculoskeletal:  Positive for back pain.   Skin: Negative.    Neurological: Negative.    Psychiatric/Behavioral: Negative.     All other systems reviewed and are negative.    Objective:     Vital Signs (Most Recent):  Temp: 97.7 °F (36.5 °C) (11/21/23 1111)  Pulse: 88 (11/21/23 1111)  Resp: 16 (11/21/23 1111)  BP: 121/60 (11/21/23 1111)  SpO2: 97 % (11/21/23 1111) Vital Signs (24h Range):  Temp:  [97.6 °F (36.4 °C)-98.6 °F (37 °C)] 97.7 °F (36.5 °C)  Pulse:  [] 88  Resp:  [15-22] 16  SpO2:  [92 %-100 %] 97 %  BP: (104-127)/(58-77) 121/60     Weight: 103 kg (227 lb)  Body mass index is 29.95 kg/m².     Physical Exam  Vitals and nursing note reviewed.   Constitutional:       Appearance: Normal appearance. He is obese.   HENT:      Head: Normocephalic and atraumatic.   Cardiovascular:      Rate and  Rhythm: Normal rate and regular rhythm.   Pulmonary:      Effort: Pulmonary effort is normal. No respiratory distress.   Abdominal:      General: Abdomen is flat.      Palpations: Abdomen is soft.      Comments: Mild epigastric tenderness    Musculoskeletal:      Right lower leg: No edema.      Left lower leg: No edema.   Skin:     General: Skin is warm and dry.   Neurological:      General: No focal deficit present.      Mental Status: He is alert and oriented to person, place, and time.   Psychiatric:         Mood and Affect: Mood normal.         Behavior: Behavior normal.            I have reviewed all pertinent lab results within the past 24 hours.    Significant Diagnostics:  I have reviewed all pertinent imaging results/findings within the past 24 hours.  CT abd/pelvis 11/20/2023  Impression:     1. A large solid mass which appears to be at least partially contiguous with the lesser curvature of the stomach, raising the question of gastric malignancy.  2. There are multiple enlarged upper abdominal and periaortic lymph nodes, consistent with metastatic disease.  Given the extent of michael enlargement, these findings raise the question of lymphoma with involvement of the stomach.  3. An oval-shaped hypodense lesion in the right hepatic lobe, too small to further characterize.  This could reflect a small cyst or metastatic lesion.      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 Extends proximally into the mid-esophagus. Proximal extent at 36 cm. Biopsied.                          - LA Grade C esophagitis.                          - Duodenitis.                          - Duodenal ulcers with a clean ulcer base (Frank Class III).   Recommendation:          - Await pathology results. Sent as rush.   - Return patient to [facility] for [facility] care.                          - Full liquid diet.                          - Continue  present medications.                          - Consult surgical oncology.

## 2023-11-21 NOTE — PLAN OF CARE
Recommendations     1. Continue FLD. Advance diet as tolerated to soft diet pending tolerance, then regular.   2. Add ONS PlanGrid Peptide 1.5 vanilla to all meals.   3. Calorie count initiated 11/21. RD to collect 11/24.   4. If J-tube TF rec's warranted, rec continuous PlanGrid Peptide 1.5 50 ml/hr to provide 1846 kcal, 89 g pro, 840 mL water. Meets 100% EEN, 100% EPN. Offer 1000 mL additional fluids by mouth or JT for hydration.      Goals: Meet % EEN by RD f/u.  Nutrition Goal Status: new  Communication of RD Recs: other (comment) (POC)

## 2023-11-21 NOTE — ASSESSMENT & PLAN NOTE
Patient presented with dysphagia, and CT revealed large, solid gastric mass (up to 8cm) with associated LAD and hepatic lesion concerning for malignancy with mets. Will consult GI and Heme-Onc for further workup and recommendations. Will likely benefit from EGD with biopsy at some point; will keep on clear liquid diet for now and NPO after midnight in case this could be performed tomorrow.   11/21 dysphagia with  CT revealed large, solid gastric mass (up to 8cm) with associated LAD and hepatic lesion concerning for malignancy with metstases.  GI and Heme-Onc consulted. 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 Extends  proximally into the mid-esophagus. Proximal extent  at 36 cm. Biopsied.                          - LA Grade C esophagitis.                          - Duodenitis.                          - Duodenal ulcers with a clean ulcer base (Frank                          Class III).    continue Full liquid diet. surgical oncology consulted .  protonix BID for 8 weeks

## 2023-11-21 NOTE — ASSESSMENT & PLAN NOTE
Reports unintentional weight loss of around 30lb in 3 weeks. Suspect secondary to dysphagia and likely malignancy. Workup as above.  Consult RD for supplement recommendations.

## 2023-11-21 NOTE — SUBJECTIVE & OBJECTIVE
No past medical history on file.    No past surgical history on file.    Review of patient's allergies indicates:   Allergen Reactions    Nsaids (non-steroidal anti-inflammatory drug) Hives     With ASA and Ibuprofen     Family History    None       Tobacco Use    Smoking status: Not on file    Smokeless tobacco: Not on file   Substance and Sexual Activity    Alcohol use: Not on file    Drug use: Not on file    Sexual activity: Not on file     Review of Systems   Constitutional:  Positive for activity change, appetite change, fatigue and unexpected weight change. Negative for chills and fever.   Gastrointestinal:  Positive for abdominal distention, abdominal pain and nausea. Negative for vomiting.     Objective:     Vital Signs (Most Recent):  Temp: 97.6 °F (36.4 °C) (11/21/23 0405)  Pulse: 93 (11/21/23 0405)  Resp: (!) 22 (11/21/23 0405)  BP: (!) 127/58 (11/21/23 0405)  SpO2: (!) 92 % (11/21/23 0405) Vital Signs (24h Range):  Temp:  [97.5 °F (36.4 °C)-98.6 °F (37 °C)] 97.6 °F (36.4 °C)  Pulse:  [] 93  Resp:  [18-22] 22  SpO2:  [92 %-95 %] 92 %  BP: (104-127)/(58-76) 127/58     Weight: 103 kg (227 lb) (11/20/23 2133)  Body mass index is 29.95 kg/m².      Intake/Output Summary (Last 24 hours) at 11/21/2023 0811  Last data filed at 11/21/2023 0703  Gross per 24 hour   Intake 765.75 ml   Output 350 ml   Net 415.75 ml       Lines/Drains/Airways       Peripheral Intravenous Line  Duration                  Peripheral IV - Single Lumen 11/20/23 1300 20 G Left;Posterior Hand <1 day                     Physical Exam  Vitals reviewed.   Constitutional:       General: He is not in acute distress.     Appearance: He is not ill-appearing.   HENT:      Head: Normocephalic.   Eyes:      Extraocular Movements: Extraocular movements intact.   Cardiovascular:      Rate and Rhythm: Normal rate.   Pulmonary:      Effort: Pulmonary effort is normal. No respiratory distress.   Abdominal:      General: There is no distension.       Tenderness: There is abdominal tenderness (diffuse). There is no guarding.   Neurological:      Mental Status: He is alert. Mental status is at baseline.          Significant Labs:  All pertinent lab results from the last 24 hours have been reviewed.    Significant Imaging:  Imaging results within the past 24 hours have been reviewed.

## 2023-11-21 NOTE — CONSULTS
"[facility] - Oncology ([facility])  Gastroenterology  Consult Note    Patient Name: David Benjamin Jr.  MRN: [id number]  Admission Date: 11/20/2023  Hospital Length of Stay: 1 days  Code Status: Full Code   Attending Provider: Toni Jeronimo MD   Consulting Provider: Yenifer Romero MD  Primary Care Physician: Ju, Primary Doctor  Principal Problem:Gastric mass    Inpatient consult to Gastroenterology  Consult performed by: Yenifer Romero MD  Consult ordered by: Alvin Morrison MD        Subjective:     HPI:  David Benjamin is a 58 year old male for whom GI is consulted for gastric mass. He has a medical history signifciant for former tobacco and alcohol use. History provided by patient and chart review.      Pt states over the last few weeks he has been experiencing diffuse abdominal pain with associated bloating. Associated symptoms include a weight loss of at least 25 lbs since onset of symptoms as well as nausea. On arrival, he was afebrile and HDS. Laboratory workup notable for normocytic anemia with hgb 13.6, hypoalbuminemia 2.9 and ALT 54. CTAP showed, "A large solid mass which appears to be at least partially contiguous with the lesser curvature of the stomach, raising the question of gastric malignancy.There are multiple enlarged upper abdominal and periaortic lymph nodes, consistent with metastatic disease.  Given the extent of michael enlargement, these findings raise the question of lymphoma with involvement of the stomach." No prior upper endoscopies.       No past medical history on file.    No past surgical history on file.    Review of patient's allergies indicates:   Allergen Reactions    Nsaids (non-steroidal anti-inflammatory drug) Hives     With ASA and Ibuprofen     Family History    None       Tobacco Use    Smoking status: Not on file    Smokeless tobacco: Not on file   Substance and Sexual Activity    Alcohol use: Not on file    Drug use: Not on file    Sexual activity: Not " on file     Review of Systems   Constitutional:  Positive for activity change, appetite change, fatigue and unexpected weight change. Negative for chills and fever.   Gastrointestinal:  Positive for abdominal distention, abdominal pain and nausea. Negative for vomiting.     Objective:     Vital Signs (Most Recent):  Temp: 97.6 °F (36.4 °C) (11/21/23 0405)  Pulse: 93 (11/21/23 0405)  Resp: (!) 22 (11/21/23 0405)  BP: (!) 127/58 (11/21/23 0405)  SpO2: (!) 92 % (11/21/23 0405) Vital Signs (24h Range):  Temp:  [97.5 °F (36.4 °C)-98.6 °F (37 °C)] 97.6 °F (36.4 °C)  Pulse:  [] 93  Resp:  [18-22] 22  SpO2:  [92 %-95 %] 92 %  BP: (104-127)/(58-76) 127/58     Weight: 103 kg (227 lb) (11/20/23 2133)  Body mass index is 29.95 kg/m².      Intake/Output Summary (Last 24 hours) at 11/21/2023 0811  Last data filed at 11/21/2023 0703  Gross per 24 hour   Intake 765.75 ml   Output 350 ml   Net 415.75 ml       Lines/Drains/Airways       Peripheral Intravenous Line  Duration                  Peripheral IV - Single Lumen 11/20/23 1300 20 G Left;Posterior Hand <1 day                     Physical Exam  Vitals reviewed.   Constitutional:       General: He is not in acute distress.     Appearance: He is not ill-appearing.   HENT:      Head: Normocephalic.   Eyes:      Extraocular Movements: Extraocular movements intact.   Cardiovascular:      Rate and Rhythm: Normal rate.   Pulmonary:      Effort: Pulmonary effort is normal. No respiratory distress.   Abdominal:      General: There is no distension.      Tenderness: There is abdominal tenderness (diffuse). There is no guarding.   Neurological:      Mental Status: He is alert. Mental status is at baseline.          Significant Labs:  All pertinent lab results from the last 24 hours have been reviewed.    Significant Imaging:  Imaging results within the past 24 hours have been reviewed.  Assessment/Plan:     GI  * Gastric mass  58 year old male with former tobacco and alcohol use  presenting with a 3-week history of abdominal pain, weight loss, fatigue and bloating. CTAP concerning for gastric mass with upper abdominal and periaortic lymph nodes, likely representative of metastatic disease. No blood thinners.     Recommendations:  - Keep NPO, plan for EGD today.       Thank you for your consult. I will follow-up with patient. Please contact us if you have any additional questions.    Yenifer Rmoero MD  Gastroenterology  [facility] - Oncology ([facility])

## 2023-11-21 NOTE — ASSESSMENT & PLAN NOTE
Reports unintentional weight loss of around 30lb in 3 weeks. Suspect secondary to dysphagia and likely malignancy. Workup as above.  Consult RD for supplement recommendations.  11/21  Dietary consulted for calorie count. started on boost plus TID.  if unable to keep adequate oral intake with full liquids, General surgery  plans for J tube placement and  port placement on friday

## 2023-11-21 NOTE — HPI
"David Benjamin Jr. is a 58 y.o. male with no significant past medical history who presents as a transfer from [facility] with dysphagia and weight loss.     He reports that he had back surgery in February, and has had some chronic back pain since that time.  Approximately 3 weeks ago, he establish with pain management and started tramadol.  Around this time, he noted difficulty swallowing which he initially thought was a reaction to the tramadol.  When taking solid foods, he feels that they get "clogged" in his throat, which increasingly has been unrelieved with drinking liquids.  He has no issues with drinking liquids and has been trying to take protein shakes as he can not tolerate solid foods.  When this occurs, he gets chest discomfort and abdominal bloating.  He reports night sweats, generalized malaise, and generalized weakness.  He is also had an approximately 30 lb unintentional weight loss in the past 3 weeks.  Due to his generalized weakness and malaise, he presented to the ED today.      He is a former smoker of approximately 65 pack-years and quit in 2021. Denies heavy EtOH use. Remote family history of unknown CA in his aunts and uncles.  He is never had a colonoscopy.        "

## 2023-11-22 VITALS
HEIGHT: 73 IN | RESPIRATION RATE: 14 BRPM | TEMPERATURE: 97 F | BODY MASS INDEX: 30.09 KG/M2 | OXYGEN SATURATION: 97 % | WEIGHT: 227 LBS | HEART RATE: 79 BPM | SYSTOLIC BLOOD PRESSURE: 98 MMHG | DIASTOLIC BLOOD PRESSURE: 57 MMHG

## 2023-11-22 LAB
ALBUMIN SERPL BCP-MCNC: 2.8 G/DL (ref 3.5–5.2)
ALBUMIN SERPL BCP-MCNC: 2.8 G/DL (ref 3.5–5.2)
ALP SERPL-CCNC: 63 U/L (ref 55–135)
ALP SERPL-CCNC: 63 U/L (ref 55–135)
ALT SERPL W/O P-5'-P-CCNC: 64 U/L (ref 10–44)
ALT SERPL W/O P-5'-P-CCNC: 64 U/L (ref 10–44)
ANION GAP SERPL CALC-SCNC: 13 MMOL/L (ref 8–16)
ANION GAP SERPL CALC-SCNC: 13 MMOL/L (ref 8–16)
AST SERPL-CCNC: 40 U/L (ref 10–40)
AST SERPL-CCNC: 40 U/L (ref 10–40)
BASOPHILS # BLD AUTO: 0.02 K/UL (ref 0–0.2)
BASOPHILS # BLD AUTO: 0.02 K/UL (ref 0–0.2)
BASOPHILS # BLD AUTO: 0.04 K/UL (ref 0–0.2)
BASOPHILS # BLD AUTO: 0.04 K/UL (ref 0–0.2)
BASOPHILS NFR BLD: 0.2 % (ref 0–1.9)
BASOPHILS NFR BLD: 0.2 % (ref 0–1.9)
BASOPHILS NFR BLD: 0.4 % (ref 0–1.9)
BASOPHILS NFR BLD: 0.4 % (ref 0–1.9)
BILIRUB SERPL-MCNC: 0.5 MG/DL (ref 0.1–1)
BILIRUB SERPL-MCNC: 0.5 MG/DL (ref 0.1–1)
BUN SERPL-MCNC: 10 MG/DL (ref 6–20)
BUN SERPL-MCNC: 10 MG/DL (ref 6–20)
CALCIUM SERPL-MCNC: 8.8 MG/DL (ref 8.7–10.5)
CALCIUM SERPL-MCNC: 8.8 MG/DL (ref 8.7–10.5)
CHLORIDE SERPL-SCNC: 106 MMOL/L (ref 95–110)
CHLORIDE SERPL-SCNC: 106 MMOL/L (ref 95–110)
CO2 SERPL-SCNC: 20 MMOL/L (ref 23–29)
CO2 SERPL-SCNC: 20 MMOL/L (ref 23–29)
CREAT SERPL-MCNC: 0.7 MG/DL (ref 0.5–1.4)
CREAT SERPL-MCNC: 0.7 MG/DL (ref 0.5–1.4)
DIFFERENTIAL METHOD: ABNORMAL
EOSINOPHIL # BLD AUTO: 0.1 K/UL (ref 0–0.5)
EOSINOPHIL NFR BLD: 0.6 % (ref 0–8)
EOSINOPHIL NFR BLD: 0.7 % (ref 0–8)
EOSINOPHIL NFR BLD: 1.2 % (ref 0–8)
EOSINOPHIL NFR BLD: 1.2 % (ref 0–8)
ERYTHROCYTE [DISTWIDTH] IN BLOOD BY AUTOMATED COUNT: 13.3 % (ref 11.5–14.5)
ERYTHROCYTE [DISTWIDTH] IN BLOOD BY AUTOMATED COUNT: 13.5 % (ref 11.5–14.5)
EST. GFR  (NO RACE VARIABLE): >60 ML/MIN/1.73 M^2
EST. GFR  (NO RACE VARIABLE): >60 ML/MIN/1.73 M^2
GLUCOSE SERPL-MCNC: 108 MG/DL (ref 70–110)
GLUCOSE SERPL-MCNC: 108 MG/DL (ref 70–110)
HCT VFR BLD AUTO: 35.2 % (ref 40–54)
HCT VFR BLD AUTO: 37.8 % (ref 40–54)
HCT VFR BLD AUTO: 40.7 % (ref 40–54)
HCT VFR BLD AUTO: 40.7 % (ref 40–54)
HGB BLD-MCNC: 11.6 G/DL (ref 14–18)
HGB BLD-MCNC: 12 G/DL (ref 14–18)
HGB BLD-MCNC: 13 G/DL (ref 14–18)
HGB BLD-MCNC: 13 G/DL (ref 14–18)
IMM GRANULOCYTES # BLD AUTO: 0.04 K/UL (ref 0–0.04)
IMM GRANULOCYTES # BLD AUTO: 0.05 K/UL (ref 0–0.04)
IMM GRANULOCYTES # BLD AUTO: 0.05 K/UL (ref 0–0.04)
IMM GRANULOCYTES # BLD AUTO: 0.06 K/UL (ref 0–0.04)
IMM GRANULOCYTES NFR BLD AUTO: 0.4 % (ref 0–0.5)
IMM GRANULOCYTES NFR BLD AUTO: 0.5 % (ref 0–0.5)
IMM GRANULOCYTES NFR BLD AUTO: 0.6 % (ref 0–0.5)
IMM GRANULOCYTES NFR BLD AUTO: 0.6 % (ref 0–0.5)
LYMPHOCYTES # BLD AUTO: 1.1 K/UL (ref 1–4.8)
LYMPHOCYTES # BLD AUTO: 1.4 K/UL (ref 1–4.8)
LYMPHOCYTES # BLD AUTO: 1.4 K/UL (ref 1–4.8)
LYMPHOCYTES # BLD AUTO: 1.5 K/UL (ref 1–4.8)
LYMPHOCYTES NFR BLD: 10.8 % (ref 18–48)
LYMPHOCYTES NFR BLD: 13.7 % (ref 18–48)
LYMPHOCYTES NFR BLD: 15.8 % (ref 18–48)
LYMPHOCYTES NFR BLD: 15.8 % (ref 18–48)
MAGNESIUM SERPL-MCNC: 1.9 MG/DL (ref 1.6–2.6)
MAGNESIUM SERPL-MCNC: 1.9 MG/DL (ref 1.6–2.6)
MCH RBC QN AUTO: 28 PG (ref 27–31)
MCH RBC QN AUTO: 28.1 PG (ref 27–31)
MCH RBC QN AUTO: 28.1 PG (ref 27–31)
MCH RBC QN AUTO: 28.6 PG (ref 27–31)
MCHC RBC AUTO-ENTMCNC: 31.7 G/DL (ref 32–36)
MCHC RBC AUTO-ENTMCNC: 31.9 G/DL (ref 32–36)
MCHC RBC AUTO-ENTMCNC: 31.9 G/DL (ref 32–36)
MCHC RBC AUTO-ENTMCNC: 33 G/DL (ref 32–36)
MCV RBC AUTO: 87 FL (ref 82–98)
MCV RBC AUTO: 88 FL (ref 82–98)
MONOCYTES # BLD AUTO: 0.6 K/UL (ref 0.3–1)
MONOCYTES # BLD AUTO: 0.8 K/UL (ref 0.3–1)
MONOCYTES # BLD AUTO: 0.8 K/UL (ref 0.3–1)
MONOCYTES # BLD AUTO: 1 K/UL (ref 0.3–1)
MONOCYTES NFR BLD: 6.1 % (ref 4–15)
MONOCYTES NFR BLD: 8.8 % (ref 4–15)
MONOCYTES NFR BLD: 8.8 % (ref 4–15)
MONOCYTES NFR BLD: 9.3 % (ref 4–15)
NEUTROPHILS # BLD AUTO: 6.5 K/UL (ref 1.8–7.7)
NEUTROPHILS # BLD AUTO: 6.5 K/UL (ref 1.8–7.7)
NEUTROPHILS # BLD AUTO: 8.4 K/UL (ref 1.8–7.7)
NEUTROPHILS # BLD AUTO: 8.4 K/UL (ref 1.8–7.7)
NEUTROPHILS NFR BLD: 73.2 % (ref 38–73)
NEUTROPHILS NFR BLD: 73.2 % (ref 38–73)
NEUTROPHILS NFR BLD: 75.7 % (ref 38–73)
NEUTROPHILS NFR BLD: 81.8 % (ref 38–73)
NRBC BLD-RTO: 0 /100 WBC
PLATELET # BLD AUTO: 179 K/UL (ref 150–450)
PLATELET # BLD AUTO: 179 K/UL (ref 150–450)
PLATELET # BLD AUTO: 211 K/UL (ref 150–450)
PLATELET # BLD AUTO: 227 K/UL (ref 150–450)
PLATELET BLD QL SMEAR: ABNORMAL
PMV BLD AUTO: 11.4 FL (ref 9.2–12.9)
PMV BLD AUTO: 11.5 FL (ref 9.2–12.9)
PMV BLD AUTO: 11.5 FL (ref 9.2–12.9)
PMV BLD AUTO: 12.1 FL (ref 9.2–12.9)
POTASSIUM SERPL-SCNC: 4 MMOL/L (ref 3.5–5.1)
POTASSIUM SERPL-SCNC: 4 MMOL/L (ref 3.5–5.1)
PROT SERPL-MCNC: 5.7 G/DL (ref 6–8.4)
PROT SERPL-MCNC: 5.7 G/DL (ref 6–8.4)
RBC # BLD AUTO: 4.06 M/UL (ref 4.6–6.2)
RBC # BLD AUTO: 4.28 M/UL (ref 4.6–6.2)
RBC # BLD AUTO: 4.63 M/UL (ref 4.6–6.2)
RBC # BLD AUTO: 4.63 M/UL (ref 4.6–6.2)
SODIUM SERPL-SCNC: 139 MMOL/L (ref 136–145)
SODIUM SERPL-SCNC: 139 MMOL/L (ref 136–145)
WBC # BLD AUTO: 10.27 K/UL (ref 3.9–12.7)
WBC # BLD AUTO: 11.05 K/UL (ref 3.9–12.7)
WBC # BLD AUTO: 8.93 K/UL (ref 3.9–12.7)
WBC # BLD AUTO: 8.93 K/UL (ref 3.9–12.7)

## 2023-11-22 PROCEDURE — 25000003 PHARM REV CODE 250: Performed by: HOSPITALIST

## 2023-11-22 PROCEDURE — 99239 HOSP IP/OBS DSCHRG MGMT >30: CPT | Mod: ,,, | Performed by: HOSPITALIST

## 2023-11-22 PROCEDURE — 94761 N-INVAS EAR/PLS OXIMETRY MLT: CPT

## 2023-11-22 PROCEDURE — 80053 COMPREHEN METABOLIC PANEL: CPT | Performed by: HOSPITALIST

## 2023-11-22 PROCEDURE — 36415 COLL VENOUS BLD VENIPUNCTURE: CPT | Performed by: HOSPITALIST

## 2023-11-22 PROCEDURE — 83735 ASSAY OF MAGNESIUM: CPT | Performed by: HOSPITALIST

## 2023-11-22 PROCEDURE — 99239 PR HOSPITAL DISCHARGE DAY,>30 MIN: ICD-10-PCS | Mod: ,,, | Performed by: HOSPITALIST

## 2023-11-22 PROCEDURE — 36593 DECLOT VASCULAR DEVICE: CPT

## 2023-11-22 PROCEDURE — 85025 COMPLETE CBC W/AUTO DIFF WBC: CPT | Performed by: HOSPITALIST

## 2023-11-22 RX ORDER — PANTOPRAZOLE SODIUM 40 MG/1
40 TABLET, DELAYED RELEASE ORAL
Status: DISCONTINUED | OUTPATIENT
Start: 2023-11-22 | End: 2023-11-22 | Stop reason: HOSPADM

## 2023-11-22 RX ORDER — PANTOPRAZOLE SODIUM 40 MG/1
40 TABLET, DELAYED RELEASE ORAL
Qty: 60 TABLET | Refills: 0 | Status: SHIPPED | OUTPATIENT
Start: 2023-11-22 | End: 2023-12-12

## 2023-11-22 RX ADMIN — PANTOPRAZOLE SODIUM 40 MG: 40 TABLET, DELAYED RELEASE ORAL at 10:11

## 2023-11-22 NOTE — PLAN OF CARE
Patient AAOx4. Afebrile and VSS on room air. No acute events this shift. Family at bedside. Up independently to the bathroom. Free from falls and injury this shift. On Full liquid diet. Bed locked and in lowest position, non-skid socks on, and call light within reach. Patient instructed to call if needs assistance. Plan of care reviewed and is ongoing. Patient expresses no other needs at this time.

## 2023-11-22 NOTE — PROGRESS NOTES
"[facility] - Oncology ([facility])  [facility] Medicine  Progress Note    Patient Name: David Benjamin Jr.  MRN: [id number]  Patient Class: IP- Inpatient   Admission Date: 11/20/2023  Length of Stay: 2 days  Attending Physician: Toni Jeronimo MD  Primary Care Provider: Ju, Primary Doctor        Subjective:     Principal Problem:Mass of gastroesophageal junction        HPI:  David Benjamin Jr. is a 58 y.o. male with no significant past medical history who presents as a transfer from [facility] with dysphagia and weight loss.     He reports that he had back surgery in February, and has had some chronic back pain since that time.  Approximately 3 weeks ago, he establish with pain management and started tramadol.  Around this time, he noted difficulty swallowing which he initially thought was a reaction to the tramadol.  When taking solid foods, he feels that they get "clogged" in his throat, which increasingly has been unrelieved with drinking liquids.  He has no issues with drinking liquids and has been trying to take protein shakes as he can not tolerate solid foods.  When this occurs, he gets chest discomfort and abdominal bloating.  He reports night sweats, generalized malaise, and generalized weakness.  He is also had an approximately 30 lb unintentional weight loss in the past 3 weeks.  Due to his generalized weakness and malaise, he presented to the ED today.      He is a former smoker of approximately 65 pack-years and quit in 2021. Denies heavy EtOH use. Remote family history of unknown CA in his aunts and uncles.  He is never had a colonoscopy.          Overview/Hospital Course:  11/21 dysphagia with  CT revealed large, solid gastric mass (up to 8cm) with associated LAD and hepatic lesion concerning for malignancy with metstases.  GI and Heme-Onc consulted. 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 Extends  proximally into the " mid-esophagus. Proximal extent  at 36 cm. Biopsied.                          - LA Grade C esophagitis.                          - Duodenitis.                          - Duodenal ulcers with a clean ulcer base (Frank                          Class III).    continue Full liquid diet. surgical oncology consulted . protonix BID for 8 weeks . surgical oncology eval -  not a candidate for upfront resection and would need medical oncology evaluation and treatment to precede any potential surgical intervention.  Dietary consulted for calorie count. started on boost plus TID.  if unable to keep adequate oral intake with full liquids, General surgery  plans for J tube placement and  port placement on friday. patient does not want a feeding tube right now as he can tolerate full liquids.  follow up in surgery clinic with  biopsy results.   11/22 CEA WNL. Heme/onc eval - No inpatient recommendations from Oncologic standpoint. Patient can follow up in clinic with GI Oncologist to follow pathology results.  tolerating  full liquid diet and protonix PO today. discharge home with surgical oncology and heme onc f/u         Review of Systems:   Pain scale:   Constitutional:  fever,  chills, headache, vision loss, hearing loss, weight loss, Generalized weakness, falls, loss of smell, loss of taste, poor appetite,  sore throat. weight loss   Respiratory: cough, shortness of breath.   Cardiovascular: chest pain, dizziness, palpitations, orthopnea, swelling of feet, syncope  Gastrointestinal: nausea, vomiting, abdominal pain, diarrhea, black stool,  blood in stool, change in bowel habits, constipation, dysphagia  Genitourinary: hematuria, dysuria, urgency, frequency  Integument/Breast: rash,  pruritis  Hematologic/Lymphatic: easy bruising, lymphadenopathy  Musculoskeletal: arthralgias , myalgias, back pain- chronic, neck pain, knee pain  Neurological: confusion, seizures, tremors, slurred speech  Behavioral/Psych:  depression, anxiety,  "auditory or visual hallucinations     OBJECTIVE:     Physical Exam:  Body mass index is 29.95 kg/m².    Constitutional: Appears well-developed and well-nourished.   Head: Normocephalic and atraumatic.   Neck: Normal range of motion. Neck supple.   Cardiovascular: Normal heart rate.  Regular heart rhythm.  Pulmonary/Chest: Effort normal.   Abdominal: No distension.  No tenderness  Musculoskeletal: Normal range of motion. No edema.   Neurological: Alert and oriented to person, place, and time.   Skin: Skin is warm and dry.   Psychiatric: Normal mood and affect. Behavior is normal.                  Vital Signs  Temp: 97.5 °F (36.4 °C) (11/22/23 0735)  Pulse: 80 (11/22/23 0735)  Resp: 18 (11/22/23 0735)  BP: 100/63 (11/22/23 0735)  SpO2: (Abnormal) 94 % (11/22/23 0735)     24 Hour VS Range    Temp:  [97.4 °F (36.3 °C)-98.8 °F (37.1 °C)]   Pulse:  [80-95]   Resp:  [15-20]   BP: (100-136)/(56-77)   SpO2:  [92 %-100 %]     Intake/Output Summary (Last 24 hours) at 11/22/2023 0908  Last data filed at 11/21/2023 2151  Gross per 24 hour   Intake 1243.09 ml   Output no documentation   Net 1243.09 ml         I/O This Shift:  No intake/output data recorded.    Wt Readings from Last 3 Encounters:   11/20/23 103 kg (227 lb)   11/20/23 103 kg (227 lb)       I have personally reviewed the vitals and recorded Intake/Output     Laboratory/Diagnostic Data:    CBC/Anemia Labs: Coags:    Recent Labs   Lab 11/20/23  1312 11/21/23  1320 11/22/23  0224   WBC 11.02 10.63 8.93  8.93   HGB 13.6* 12.3* 13.0*  13.0*   HCT 42.4 38.3* 40.7  40.7    233 179  179   MCV 88 87 88  88   RDW 13.4 13.5 13.3  13.3    No results for input(s): "PT", "INR", "APTT" in the last 168 hours.     Chemistries: ABG:   Recent Labs   Lab 11/20/23  1334 11/21/23  1320 11/22/23  0224    139 139  139   K 3.8 3.8 4.0  4.0    106 106  106   CO2 26 24 20*  20*   BUN 11 12 10  10   CREATININE 0.9 0.7 0.7  0.7   CALCIUM 8.8 8.6* 8.8  8.8   PROT " "6.5 6.0 5.7*  5.7*   BILITOT 0.8 0.8 0.5  0.5   ALKPHOS 66 64 63  63   ALT 53* 58* 64*  64*   AST 35 35 40  40   MG 2.2 1.8 1.9  1.9    No results for input(s): "PH", "PCO2", "PO2", "HCO3", "POCSATURATED", "BE" in the last 168 hours.     POCT Glucose: HbA1c:    No results for input(s): "POCTGLUCOSE" in the last 168 hours. No results found for: "HGBA1C"     Cardiac Enzymes: Ejection Fractions:    No results for input(s): "CPK", "CPKMB", "MB", "TROPONINI" in the last 72 hours. No results found for: "EF"       No results for input(s): "COLORU", "APPEARANCEUA", "PHUR", "SPECGRAV", "PROTEINUA", "GLUCUA", "KETONESU", "BILIRUBINUA", "OCCULTUA", "NITRITE", "UROBILINOGEN", "LEUKOCYTESUR", "RBCUA", "WBCUA", "BACTERIA", "SQUAMEPITHEL", "HYALINECASTS" in the last 48 hours.    Invalid input(s): "WRIGHTSUR"    No results found for: "PROCAL", "LACTATE"  No results found for: "BNP"  No results found for: "CRP", "SEDRATE"  No results found for: "DDIMER"  No results found for: "FERRITIN"  No results found for: "LDH"  No results found for: "TROPONINI", "CPK"  No results found for this or any previous visit.  No results found for: "[id number]"    Microbiology labs for the last week  Microbiology Results (last 7 days)       ** No results found for the last 168 hours. **            Reviewed and noted in plan where applicable- Please see chart for full lab data.    Lines/Drains:       Peripheral IV - Single Lumen 11/20/23 1300 20 G Left;Posterior Hand (Active)   Site Assessment Clean;Intact;Dry 11/21/23 0400   Extremity Assessment Distal to IV No abnormal discoloration;No redness;No swelling;No warmth 11/20/23 2128   Line Status Infusing 11/21/23 0400   Dressing Status Clean;Dry;Intact 11/21/23 0400   Dressing Intervention Integrity maintained 11/21/23 0400   Dressing Change Due 11/20/23 11/20/23 2128   Site Change Due 11/24/23 11/20/23 2128   Reason Not Rotated Not due 11/20/23 2128   Number of days: 0       Imaging      No " results found for this or any previous visit.      CT Abdomen Pelvis With IV Contrast  Narrative: EXAMINATION:  CT ABDOMEN PELVIS WITH IV CONTRAST    CLINICAL HISTORY:  Dysphagia, 30 lb weight loss in 3 weeks;    TECHNIQUE:  Axial CT images were obtained. Iterative reconstruction technique was used. CT/cardiac nuclear exam/s in prior 12 months: 0.    COMPARISON:  CT chest with IV contrast 11/20/2023.    FINDINGS:  In the right hepatic lobe, a 1.3 cm oval-shaped hypodense lesion, too small to further characterize (image 41 of series 4 axial).    No gallstones.  The spleen, pancreas, adrenal glands and kidneys demonstrate no abnormality.    There is a moderate to large size hiatal hernia containing the proximal stomach.  There are multiple solid masses in the upper abdomen including along the lesser curvature of the stomach.  Some of these masses appear to be contiguous with the stomach (for example, image 32 of series 4 axial).    Some of these masses appear separate and others appear contiguous.  The largest component of the mass appears to measure approximately 11 x 8 cm (image 33 of series 4 axial).  This measures approximately 8 cm in craniocaudal dimension (image 75 of series 8 coronal).    There are no dilated loops of bowel.  The appendix is not visualized.  No pericecal inflammatory change.  Small amount of free fluid in the pelvis.  No free air.    There are urinary bladder diverticula.    No aortic aneurysm.    Multiple enlarged upper abdominal and periaortic lymph nodes, some of which appear contiguous.  A large michael mass in the upper abdomen measures approximately 7 x 5.5 cm (image 46 of series 4 axial).    Visualized lung bases will be discussed in the chest CT report of same day.  There is no osseous abnormality.  Impression: 1. A large solid mass which appears to be at least partially contiguous with the lesser curvature of the stomach, raising the question of gastric malignancy.  2. There are  multiple enlarged upper abdominal and periaortic lymph nodes, consistent with metastatic disease.  Given the extent of michael enlargement, these findings raise the question of lymphoma with involvement of the stomach.  3. An oval-shaped hypodense lesion in the right hepatic lobe, too small to further characterize.  This could reflect a small cyst or metastatic lesion.    Electronically signed by: Travis Reyez MD  Date:    11/20/2023  Time:    14:44  CT Chest With Contrast  Narrative: EXAMINATION:  CT CHEST WITH CONTRAST    CLINICAL HISTORY:  Dysphagia, 30 lb weight loss over 3 weeks;    TECHNIQUE:  Axial CT images were obtained. Iterative reconstruction technique was used.  CT/cardiac nuclear exam/s in prior 12 months: 0.    FINDINGS:  Breathing motion artifact on some images.    There is mucosal thickening in the airways bilaterally.    Mild pulmonary emphysema.  There is no pleural or pericardial fluid.  No aortic aneurysm.  No hilar or mediastinal lymph node enlargement.  There is a moderate to large size hiatal hernia containing the proximal stomach.  The visualized upper abdomen will be discussed in the abdomen pelvis CT report of same day.  No axillary lymph node enlargement.  No osseous abnormality.  Impression: 1. Mild pulmonary emphysema.  2. Bronchitis.  3. Moderate to large size hiatal hernia containing the proximal stomach.    Electronically signed by: Travis Reyez MD  Date:    11/20/2023  Time:    14:35      Labs, Imaging, EKG and Diagnostic results from 11/22/2023 were reviewed.    Medications:  Medication list was reviewed and changes noted under Assessment/Plan.  No current facility-administered medications on file prior to encounter.     No current outpatient medications on file prior to encounter.     Scheduled Medications:  pantoprazole, 40 mg, Intravenous, Q12H      PRN: acetaminophen, acetaminophen, aluminum-magnesium hydroxide-simethicone, melatonin, naloxone, ondansetron, polyethylene  glycol, simethicone, sodium chloride 0.9%  Infusions:       Estimated Creatinine Clearance: 141.4 mL/min (based on SCr of 0.7 mg/dL).             Assessment/Plan:      * Mass of gastroesophageal junction  Patient presented with dysphagia, and CT revealed large, solid gastric mass (up to 8cm) with associated LAD and hepatic lesion concerning for malignancy with mets. Will consult GI and Heme-Onc for further workup and recommendations. Will likely benefit from EGD with biopsy at some point; will keep on clear liquid diet for now and NPO after midnight in case this could be performed tomorrow.   11/21 dysphagia with  CT revealed large, solid gastric mass (up to 8cm) with associated LAD and hepatic lesion concerning for malignancy with metstases.  GI and Heme-Onc consulted. 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 Extends  proximally into the mid-esophagus. Proximal extent  at 36 cm. Biopsied.                          - LA Grade C esophagitis.                          - Duodenitis.                          - Duodenal ulcers with a clean ulcer base (Frank                          Class III).    continue Full liquid diet. surgical oncology consulted .  protonix BID for 8 weeks   s/p surgical oncology eval -  not a candidate for upfront resection and would need medical oncology evaluation and treatment to precede any potential surgical intervention.  patient does not want a feeding tube right now as he can tolerate full liquids.  follow up in surgery clinic with  biopsy results.   11/22 CEA WNL. Heme/onc eval - No inpatient recommendations from Oncologic standpoint. Patient can follow up in clinic with GI Oncologist to follow pathology results.   tolerating  full liquid diet and protonix PO today. discharge home with surgical oncology and heme onc f/u      Unintentional weight loss  Reports unintentional weight loss of around 30lb in 3 weeks. Suspect secondary to  dysphagia and likely malignancy. Workup as above.  Consult RD for supplement recommendations.  11/21  Dietary consulted for calorie count. started on boost plus TID.  if unable to keep adequate oral intake with full liquids, General surgery  plans for J tube placement and  port placement on friday     Other dysphagia  Suspect secondary to large gastric mass. Appreciate GI recommendations. Continue liquid diet for now.       Normocytic anemia  Mild, without indication for transfusion. Recommend outpatient PCP evaluation.     Patient's with Normocytic anemia.. Hemoglobin stable. Etiology likely due to chronic disease .  Current CBC reviewed-    Recent Labs   Lab 11/20/23  1312   HGB 13.6*         Component Value Date/Time    MCV 88 11/20/2023 1312    RDW 13.4 11/20/2023 1312     Monitor CBC and transfuse if H/H drops below 7/21.        VTE Risk Mitigation (From admission, onward)           Ordered     IP VTE LOW RISK PATIENT  Once         11/20/23 2020     Place sequential compression device  Until discontinued         11/20/23 2020     Reason for No Pharmacological VTE Prophylaxis  Once        Question:  Reasons:  Answer:  Risk of Bleeding    11/20/23 2020                    Discharge Planning   VERA: 11/23/2023     Code Status: Full Code   Is the patient medically ready for discharge?: No    Reason for patient still in hospital (select all that apply): Treatment                     Toni Jeronimo MD  Department of Hospital Medicine   [facility] - Oncology ([facility])

## 2023-11-22 NOTE — CONSULTS
"Oncology consult plan of care note    In brief, patient is a 58 year old who was a heavy prior smoker who presented with dysphagia and weight loss. Imaging showed a large gastric mass with associated lymphadenopathy. GI was consulted and he underwent EGD on 11/21 with biopsy of malignant-appearing mass. Surgical Oncology was consulted and recommended further evaluation as outpatient.    No inpatient recommendations from Oncologic standpoint. Patient can follow up in clinic with GI Oncologist to follow pathology results. Will arrange for close follow up. Please place "Ambulatory Referral to Hematology/Oncology" and notify Oncology fellow on call so that we can arrange for clinic appt upon discharge.     Discussed with attending, Dr. Stallworth. Oncology will sign off. Please contact us with any questions.    Marie Lim MD   Hematology and Oncology Fellow, PGY V      "

## 2023-11-22 NOTE — PLAN OF CARE
[facility] - Oncology (Hospital)  Discharge Final Note    Primary Care Provider: Codi Chavez FNP    Expected Discharge Date: 11/22/2023    Patient discharged 11/22/2023 home with no needs. Family provided transportation home.       Codi Chavez FNP   Specialty: Family Medicine   Relationship: PCP - General    [address] [place] [address]   Phone: [phone]       Next Steps: Follow up on 11/29/2023    Instructions: Established pt's hospital f/u visit @ 10:00am. Please bring discharge summary, ID, insurance card, and medication list.              Final Discharge Note (most recent)       Final Note - 11/22/23 1505          Final Note    Assessment Type Final Discharge Note     Anticipated Discharge Disposition Home or Self Care     Hospital Resources/Appts/Education Provided Appointments scheduled and added to AVS        Post-Acute Status    Discharge Delays None known at this time                     Important Message from Medicare             Contact Info       Codi Chavez FNP   Specialty: Family Medicine   Relationship: PCP - General    [address] [place] [address]   Phone: [phone]       Next Steps: Follow up on 11/29/2023    Instructions: Established pt's hospital f/u visit @ 10:00am. Please bring discharge summary, ID, insurance card, and medication list.          Dimple Francois RN     [phone]

## 2023-11-22 NOTE — PLAN OF CARE
[facility] (Hospital)  Initial Discharge Assessment       Primary Care Provider: Codi Chavez FNP    Admission Diagnosis: Gastric mass [K31.89]    Admission Date: 11/20/2023  Expected Discharge Date: 11/22/2023    Transition of Care Barriers: Underinsured    Payor: MEDICAID / [place] / Product Type: Managed Medicaid /     Extended Emergency Contact Information  Primary Emergency Contact: MARICEL ROBERTS  Home Phone: [phone]  Relation: Spouse  Preferred language: English    Discharge Plan A: Home with family  Discharge Plan B: Home with family      [facility] [address] [place] [address] [address] [facility] [address] [facility]  [address] [facility] [address]  Phone: [phone] Fax: [phone]      Transferred from:     History reviewed. No pertinent past medical history.      CM met with patient and Maricel Roberts (spouse) [phone], [phone](h)  in room for Discharge Planning Assessment.  Patient was able to answer questions.  Per patient, patient/spouse live in a 1-story home with 3 step(s) to enter.   Per patient, he was independent with ADLS and used quad cane for ambulation. Per patient, he is not on dialysis and does not take Coumadin.  Patient will have help from Maricel Roberts (spouse) [phone], [phone](h)  upon discharge.   Discharge Planning Booklet given to patient/family and discussed.  All questions addressed.  CM will follow for needs.      Discharge Plan A and Plan B have been determined by review of patient's clinical status, future medical and therapeutic needs, and coverage/benefits for post-acute care in coordination with multidisciplinary team members.        Initial Assessment (most recent)       Adult Discharge Assessment - 11/22/23 1032          Discharge Assessment    Assessment Type Discharge Planning Assessment     Confirmed/corrected address, phone number and insurance Yes     Confirmed Demographics Correct on Facesheet     Source of Information  patient;family     When was your last doctors appointment? 11/17/23     Communicated VERA with patient/caregiver Yes     Reason For Admission Mass of gastroesophageal junction     People in Home spouse     Facility Arrived From: [facility]     Do you expect to return to your current living situation? Yes     Do you have help at home or someone to help you manage your care at home? Yes     Who are your caregiver(s) and their phone number(s)? Adela Roberts (spouse) [phone], [phone](h)     Prior to hospitilization cognitive status: Alert/Oriented     Current cognitive status: Alert/Oriented     Walking or Climbing Stairs ambulation difficulty, requires equipment     Mobility Management quad cane     Equipment Currently Used at Home cane, quad;walker, rolling;rollator     Readmission within 30 days? No     Patient currently being followed by outpatient case management? No     Do you currently have service(s) that help you manage your care at home? No     Do you take prescription medications? Yes     Do you have prescription coverage? Yes     Coverage MEDICAID - [facility]     Do you have any problems affording any of your prescribed medications? TBD     Is the patient taking medications as prescribed? yes     Who is going to help you get home at discharge? Adela Roberts (spouse) [phone], [phone](h)     How do you get to doctors appointments? car, drives self;family or friend will provide     Are you on dialysis? No     Do you take coumadin? No     DME Needed Upon Discharge  other (see comments)   TBD    Discharge Plan discussed with: Spouse/sig other;Patient     Name(s) and Number(s) Adela Soilwesly (spouse) [phone], [phone](h)     Transition of Care Barriers Underinsured     Discharge Plan A Home with family     Discharge Plan B Home with family        Physical Activity    On average, how many days per week do you engage in moderate to strenuous exercise (like a  brisk walk)? 0 days     On average, how many minutes do you engage in exercise at this level? 0 min        Financial Resource Strain    How hard is it for you to pay for the very basics like food, housing, medical care, and heating? Somewhat hard        Housing Stability    In the last 12 months, was there a time when you were not able to pay the mortgage or rent on time? No     In the last 12 months, how many places have you lived? 1     In the last 12 months, was there a time when you did not have a steady place to sleep or slept in a shelter (including now)? No        Transportation Needs    In the past 12 months, has lack of transportation kept you from medical appointments or from getting medications? No     In the past 12 months, has lack of transportation kept you from meetings, work, or from getting things needed for daily living? No        Food Insecurity    Within the past 12 months, you worried that your food would run out before you got the money to buy more. Never true     Within the past 12 months, the food you bought just didn't last and you didn't have money to get more. Never true        Stress    Do you feel stress - tense, restless, nervous, or anxious, or unable to sleep at night because your mind is troubled all the time - these days? Rather much        Social Connections    In a typical week, how many times do you talk on the phone with family, friends, or neighbors? More than three times a week     How often do you get together with friends or relatives? More than three times a week     How often do you attend Christian or Worship services? Never     Do you belong to any clubs or organizations such as Christian groups, unions, fraternal or athletic groups, or school groups? No     How often do you attend meetings of the clubs or organizations you belong to? Never     Are you , , , , never , or living with a partner?         Alcohol Use    Q1: How often  do you have a drink containing alcohol? Never     Q2: How many drinks containing alcohol do you have on a typical day when you are drinking? Patient does not drink     Q3: How often do you have six or more drinks on one occasion? Never        OTHER    Name(s) of People in Home Maricel Roberts (spouse) [phone], [phone](h)                          PCP:  Codi Chavez FNP  [phone]        Pharmacy:    [address] [address]  Phone: [phone] Fax: [phone]        Emergency Contacts:  Extended Emergency Contact Information  Primary Emergency Contact: MARICEL ROBERTS  [place] Phone: [phone]  Relation: Spouse  Preferred language: English      Insurance:    Payor: MEDICAID / [place] / Product Type: Managed Medicaid /     Dimple Francois RN     [phone]      11/22/2023  10:40 AM

## 2023-11-22 NOTE — PLAN OF CARE
Discharge instructions and prescriptions given and explained to pt. Pt. And significant other  verbalized understanding with no further questions. Peripheral IV D/C'd with catheter tip intact. VS WDL. Patient is awaiting ride home by significant other. Will be transported down by wheelchair. All questions and answers addressed at this time.

## 2023-11-22 NOTE — DISCHARGE SUMMARY
"[facility] ([facility])  [facility]  Discharge Summary      Patient Name: David Benjamin Jr.  MRN: [id number]  [facility]: [id number]  Patient Class: IP- Inpatient  Admission Date: 11/20/2023  Hospital Length of Stay: 2 days  Discharge Date and Time:  11/22/2023 9:09 AM  Attending Physician: Toni Jeronimo MD   Discharging Provider: Toni Jeronimo MD  Primary Care Provider: Ju Primary Doctor  [facility] Medicine Team: [facility] MED R Toni Jeronimo MD  Primary Care Team: [facility] R    HPI:   David Benjamin Jr. is a 58 y.o. male with no significant past medical history who presents as a transfer from [facility] with dysphagia and weight loss.     He reports that he had back surgery in February, and has had some chronic back pain since that time.  Approximately 3 weeks ago, he establish with pain management and started tramadol.  Around this time, he noted difficulty swallowing which he initially thought was a reaction to the tramadol.  When taking solid foods, he feels that they get "clogged" in his throat, which increasingly has been unrelieved with drinking liquids.  He has no issues with drinking liquids and has been trying to take protein shakes as he can not tolerate solid foods.  When this occurs, he gets chest discomfort and abdominal bloating.  He reports night sweats, generalized malaise, and generalized weakness.  He is also had an approximately 30 lb unintentional weight loss in the past 3 weeks.  Due to his generalized weakness and malaise, he presented to the ED today.      He is a former smoker of approximately 65 pack-years and quit in 2021. Denies heavy EtOH use. Remote family history of unknown CA in his aunts and uncles.  He is never had a colonoscopy.          Procedure(s) (LRB):  EGD (ESOPHAGOGASTRODUODENOSCOPY) (N/A)      Hospital Course:   11/21 dysphagia with  CT revealed large, solid gastric mass (up to 8cm) with associated LAD and hepatic lesion " concerning for malignancy with metstases.  GI and Heme-Onc consulted. 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 Extends  proximally into the mid-esophagus. Proximal extent  at 36 cm. Biopsied.                          - LA Grade C esophagitis.                          - Duodenitis.                          - Duodenal ulcers with a clean ulcer base (Frank                          Class III).    continue Full liquid diet. surgical oncology consulted . protonix BID for 8 weeks . surgical oncology eval -  not a candidate for upfront resection and would need medical oncology evaluation and treatment to precede any potential surgical intervention.  Dietary consulted for calorie count. started on boost plus TID.  if unable to keep adequate oral intake with full liquids, General surgery  plans for J tube placement and  port placement on friday. patient does not want a feeding tube right now as he can tolerate full liquids.  follow up in surgery clinic with  biopsy results.   11/22 CEA WNL. Heme/onc eval - No inpatient recommendations from Oncologic standpoint. Patient can follow up in clinic with GI Oncologist to follow pathology results.  tolerating  full liquid diet and protonix PO today. discharge home with surgical oncology and heme onc f/u      Goals of Care Treatment Preferences:  Code Status: Full Code      Consults:   Consults (From admission, onward)          Status Ordering Provider     Inpatient consult to Registered Dietitian/Nutritionist  Once        Provider:  (Not yet assigned)    Completed JUNIOR REY     Inpatient consult to Surgical Oncology  Once        Provider:  (Not yet assigned)    Completed JUNIOR REY     Inpatient consult to Registered Dietitian/Nutritionist  Once        Provider:  (Not yet assigned)    Completed ISRAEL JOHNSON     Inpatient consult to Hematology/Oncology  Once        Provider:  (Not yet assigned)     Completed ISRAEL JOHNSON     Inpatient consult to Gastroenterology  Once        Provider:  (Not yet assigned)    Completed ISRAEL JOHNSON            Endocrine  Unintentional weight loss  Reports unintentional weight loss of around 30lb in 3 weeks. Suspect secondary to dysphagia and likely malignancy. Workup as above.  Consult RD for supplement recommendations.  11/21  Dietary consulted for calorie count. started on boost plus TID.  if unable to keep adequate oral intake with full liquids, General surgery  plans for J tube placement and  port placement on friday     GI  * Mass of gastroesophageal junction  Patient presented with dysphagia, and CT revealed large, solid gastric mass (up to 8cm) with associated LAD and hepatic lesion concerning for malignancy with mets. Will consult GI and Heme-Onc for further workup and recommendations. Will likely benefit from EGD with biopsy at some point; will keep on clear liquid diet for now and NPO after midnight in case this could be performed tomorrow.   11/21 dysphagia with  CT revealed large, solid gastric mass (up to 8cm) with associated LAD and hepatic lesion concerning for malignancy with metstases.  GI and Heme-Onc consulted. 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 Extends  proximally into the mid-esophagus. Proximal extent  at 36 cm. Biopsied.                          - LA Grade C esophagitis.                          - Duodenitis.                          - Duodenal ulcers with a clean ulcer base (Frank                          Class III).    continue Full liquid diet. surgical oncology consulted .  protonix BID for 8 weeks   s/p surgical oncology eval -  not a candidate for upfront resection and would need medical oncology evaluation and treatment to precede any potential surgical intervention.  patient does not want a feeding tube right now as he can tolerate full liquids.  follow up in  surgery clinic with  biopsy results.   11/22 CEA WNL. Heme/onc eval - No inpatient recommendations from Oncologic standpoint. Patient can follow up in clinic with GI Oncologist to follow pathology results.   tolerating  full liquid diet and protonix PO today. 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] 11/20/2023 Yes    Normocytic anemia [D64.9] 11/20/2023 Yes    Other dysphagia [R13.19] 11/20/2023 Yes     Chronic    Unintentional weight loss [R63.4] 11/20/2023 Yes     Chronic      Problems Resolved During this Admission:       Discharged Condition: fair    Disposition:     Follow Up:    Patient Instructions:      Ambulatory referral/consult to Hematology / Oncology   Standing Status: Future   Referral Priority: Routine Referral Type: Consultation   Referral Reason: Specialty Services Required   Requested Specialty: Hematology and Oncology   Number of Visits Requested: 1     Ambulatory referral/consult to Surgical Oncology   Standing Status: Future   Referral Priority: Routine Referral Type: Consultation   Referral Reason: Specialty Services Required   Requested Specialty: Surgical Oncology   Number of Visits Requested: 1       Significant Diagnostic Studies: Labs: BMP:   Recent Labs   Lab 11/20/23  1334 11/21/23  1320 11/22/23  0224   * 138* 108  108    139 139  139   K 3.8 3.8 4.0  4.0    106 106  106   CO2 26 24 20*  20*   BUN 11 12 10  10   CREATININE 0.9 0.7 0.7  0.7   CALCIUM 8.8 8.6* 8.8  8.8   MG 2.2 1.8 1.9  1.9   , CMP   Recent Labs   Lab 11/20/23  1334 11/21/23  1320 11/22/23  0224    139 139  139   K 3.8 3.8 4.0  4.0    106 106  106   CO2 26 24 20*  20*   * 138* 108  108   BUN 11 12 10  10   CREATININE 0.9 0.7 0.7  0.7   CALCIUM 8.8 8.6* 8.8  8.8   PROT 6.5 6.0 5.7*  5.7*   ALBUMIN 2.9* 2.9* 2.8*  2.8*   BILITOT  "0.8 0.8 0.5  0.5   ALKPHOS 66 64 63  63   AST 35 35 40  40   ALT 53* 58* 64*  64*   ANIONGAP 9 9 13  13   , CBC   Recent Labs   Lab 11/20/23  1312 11/21/23  1320 11/22/23  0224   WBC 11.02 10.63 8.93  8.93   HGB 13.6* 12.3* 13.0*  13.0*   HCT 42.4 38.3* 40.7  40.7    233 179  179   , INR   Lab Results   Component Value Date    INR 0.9 12/19/2022   , Lipid Panel No results found for: "CHOL", "HDL", "LDLCALC", "TRIG", "CHOLHDL", Troponin No results for input(s): "TROPONINI" in the last 168 hours., A1C: No results for input(s): "HGBA1C" in the last 4320 hours., and All labs within the past 24 hours have been reviewed  Microbiology: Blood Culture No results found for: "LABBLOO", Sputum Culture No results found for: "GSRESP", "RESPIRATORYC", and Urine Culture  No results found for: "LABURIN"    CT Abdomen Pelvis With IV Contrast  Narrative: EXAMINATION:  CT ABDOMEN PELVIS WITH IV CONTRAST    CLINICAL HISTORY:  Dysphagia, 30 lb weight loss in 3 weeks;    TECHNIQUE:  Axial CT images were obtained. Iterative reconstruction technique was used. CT/cardiac nuclear exam/s in prior 12 months: 0.    COMPARISON:  CT chest with IV contrast 11/20/2023.    FINDINGS:  In the right hepatic lobe, a 1.3 cm oval-shaped hypodense lesion, too small to further characterize (image 41 of series 4 axial).    No gallstones.  The spleen, pancreas, adrenal glands and kidneys demonstrate no abnormality.    There is a moderate to large size hiatal hernia containing the proximal stomach.  There are multiple solid masses in the upper abdomen including along the lesser curvature of the stomach.  Some of these masses appear to be contiguous with the stomach (for example, image 32 of series 4 axial).    Some of these masses appear separate and others appear contiguous.  The largest component of the mass appears to measure approximately 11 x 8 cm (image 33 of series 4 axial).  This measures approximately 8 cm in craniocaudal dimension " (image 75 of series 8 coronal).    There are no dilated loops of bowel.  The appendix is not visualized.  No pericecal inflammatory change.  Small amount of free fluid in the pelvis.  No free air.    There are urinary bladder diverticula.    No aortic aneurysm.    Multiple enlarged upper abdominal and periaortic lymph nodes, some of which appear contiguous.  A large michael mass in the upper abdomen measures approximately 7 x 5.5 cm (image 46 of series 4 axial).    Visualized lung bases will be discussed in the chest CT report of same day.  There is no osseous abnormality.  Impression: 1. A large solid mass which appears to be at least partially contiguous with the lesser curvature of the stomach, raising the question of gastric malignancy.  2. There are multiple enlarged upper abdominal and periaortic lymph nodes, consistent with metastatic disease.  Given the extent of michael enlargement, these findings raise the question of lymphoma with involvement of the stomach.  3. An oval-shaped hypodense lesion in the right hepatic lobe, too small to further characterize.  This could reflect a small cyst or metastatic lesion.    Electronically signed by: rTavis Reyez MD  Date:    11/20/2023  Time:    14:44  CT Chest With Contrast  Narrative: EXAMINATION:  CT CHEST WITH CONTRAST    CLINICAL HISTORY:  Dysphagia, 30 lb weight loss over 3 weeks;    TECHNIQUE:  Axial CT images were obtained. Iterative reconstruction technique was used.  CT/cardiac nuclear exam/s in prior 12 months: 0.    FINDINGS:  Breathing motion artifact on some images.    There is mucosal thickening in the airways bilaterally.    Mild pulmonary emphysema.  There is no pleural or pericardial fluid.  No aortic aneurysm.  No hilar or mediastinal lymph node enlargement.  There is a moderate to large size hiatal hernia containing the proximal stomach.  The visualized upper abdomen will be discussed in the abdomen pelvis CT report of same day.  No axillary lymph  node enlargement.  No osseous abnormality.  Impression: 1. Mild pulmonary emphysema.  2. Bronchitis.  3. Moderate to large size hiatal hernia containing the proximal stomach.    Electronically signed by: Travis Reyez MD  Date:    11/20/2023  Time:    14:35       Pending Diagnostic Studies:       Procedure Component Value Units Date/Time    CBC Auto Differential [[id number]] Collected: 11/21/23 1320    Order Status: Sent Lab Status: In process Updated: 11/21/23 1321    Specimen: Blood     Comprehensive Metabolic Panel [[id number]] Collected: 11/21/23 1320    Order Status: Sent Lab Status: In process Updated: 11/21/23 1321    Specimen: Blood     Magnesium [[id number]] Collected: 11/21/23 1320    Order Status: Sent Lab Status: In process Updated: 11/21/23 1321    Specimen: Blood            Medications:  Reconciled Home Medications:      Medication List        Start taking these medications      pantoprazole 40 MG tablet  Commonly known as: PROTONIX  Take 1 tablet (40 mg total) by mouth 2 (two) times daily before meals.              Indwelling Lines/Drains at time of discharge:   Lines/Drains/Airways       None                   Time spent on the discharge of patient: 35 minutes         Toni Jeronimo MD  Department of Hospital Medicine  [facility] - Oncology ([facility])

## 2023-11-24 LAB
FINAL PATHOLOGIC DIAGNOSIS: NORMAL
GROSS: NORMAL
Lab: NORMAL
MICROSCOPIC EXAM: NORMAL
SUPPLEMENTAL DIAGNOSIS: NORMAL

## 2023-11-27 DIAGNOSIS — K31.89 MASS OF GASTROESOPHAGEAL JUNCTION: Primary | ICD-10-CM

## 2023-11-27 DIAGNOSIS — C7A.8 NEUROENDOCRINE CARCINOMA: ICD-10-CM

## 2023-11-28 ENCOUNTER — TELEPHONE (OUTPATIENT)
Dept: SURGERY | Facility: CLINIC | Age: 58
End: 2023-11-28
Payer: MEDICAID

## 2023-11-28 ENCOUNTER — HOSPITAL ENCOUNTER (EMERGENCY)
Facility: HOSPITAL | Age: 58
Discharge: HOME | End: 2023-11-29
Attending: EMERGENCY MEDICINE
Payer: MEDICAID

## 2023-11-28 ENCOUNTER — TELEPHONE (OUTPATIENT)
Dept: HEMATOLOGY/ONCOLOGY | Facility: CLINIC | Age: 58
End: 2023-11-28
Payer: MEDICAID

## 2023-11-28 VITALS
BODY MASS INDEX: 29.82 KG/M2 | HEART RATE: 119 BPM | OXYGEN SATURATION: 95 % | SYSTOLIC BLOOD PRESSURE: 101 MMHG | WEIGHT: 225 LBS | RESPIRATION RATE: 17 BRPM | DIASTOLIC BLOOD PRESSURE: 84 MMHG | HEIGHT: 73 IN | TEMPERATURE: 98 F

## 2023-11-28 DIAGNOSIS — R45.89 ANXIETY ABOUT HEALTH: ICD-10-CM

## 2023-11-28 DIAGNOSIS — C7A.8 NEUROENDOCRINE CARCINOMA: Primary | ICD-10-CM

## 2023-11-28 DIAGNOSIS — R13.10 DYSPHAGIA, UNSPECIFIED TYPE: Primary | ICD-10-CM

## 2023-11-28 PROCEDURE — 99282 EMERGENCY DEPT VISIT SF MDM: CPT

## 2023-11-28 RX ORDER — METRONIDAZOLE ORAL 100 MG/ML
5 SUSPENSION ORAL 2 TIMES DAILY
COMMUNITY
Start: 2023-11-27 | End: 2023-12-12

## 2023-11-28 RX ORDER — OMEPRAZOLE 40 MG/1
40 CAPSULE, DELAYED RELEASE ORAL
COMMUNITY
Start: 2023-11-27 | End: 2023-12-12

## 2023-11-28 RX ORDER — CLARITHROMYCIN 250 MG/5ML
10 FOR SUSPENSION ORAL 2 TIMES DAILY
COMMUNITY
Start: 2023-11-27 | End: 2023-12-12

## 2023-11-28 NOTE — NURSING
Spoke with patient and scheduled appointment for 12/12/2023 with Dr. Lopez; Provided patient with appointment time and date, address of [facility] facility and direct line to navigator. All questions and concerns addressed.

## 2023-11-29 NOTE — TELEPHONE ENCOUNTER
I spoke to his wife to discuss the results of the biopsy. 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 He is scheduled to see Dr. Lopez next week. She said that he is really struggling with PO intake, and they are reconsidering feeding tube placement. I offered to see him this week or have him admitted here to place the tube. They are going to talk his PCP tomorrow about possibly being admitted locally and having a feeding tube placed there.

## 2023-11-29 NOTE — ED PROVIDER NOTES
Encounter Date: 11/28/2023       History     Chief Complaint   Patient presents with    Dysphagia     Pt unable to eat or swallow antibiotics prescribed. Was seen here last week and sent to [place] r/t mass seen in stomach. EGD and biopsy done in [place] with diagnosis of stomach cancer. Started on antibiotics by PCP for H Pylori but unable to swallow the pills. Feeling weak and dehydrated.      57 yo male here via POV with complaint of ongoing difficulty with swallowing solids. Also concerned he is having difficulty with PO antibiotics prescribed for dyspepsia thought to be due to h pylori, despite a negative test. Reports unable to tolerate taste of prescribed abx. Prescribed liquid version, but has not yet arrived to pharmacy. No new feature. Tolerating PO fluids.       Review of patient's allergies indicates:   Allergen Reactions    Nsaids (non-steroidal anti-inflammatory drug) Hives     With ASA and Ibuprofen     History reviewed. No pertinent past medical history.  Past Surgical History:   Procedure Laterality Date    ESOPHAGOGASTRODUODENOSCOPY N/A 11/21/2023    Procedure: EGD (ESOPHAGOGASTRODUODENOSCOPY);  Surgeon: Princess Voss MD;  Location: [address];  Service: Gastroenterology;  Laterality: N/A;     History reviewed. No pertinent family history.  Social History     Tobacco Use    Smoking status: Former     Types: Cigarettes   Substance Use Topics    Alcohol use: Not Currently     Review of Systems   Constitutional: Negative.    HENT:  Positive for trouble swallowing.    Respiratory: Negative.     Cardiovascular: Negative.    Gastrointestinal: Negative.    All other systems reviewed and are negative.      Physical Exam     Initial Vitals [11/28/23 2004]   BP Pulse Resp Temp SpO2   101/84 (!) 119 17 97.8 °F (36.6 °C) 95 %      MAP       --         Physical Exam    Nursing note and vitals reviewed.  Constitutional: He is not diaphoretic. No distress.   HENT:   Head: Normocephalic and  atraumatic.   Mouth/Throat: Oropharynx is clear and moist.   Eyes: EOM are normal. Pupils are equal, round, and reactive to light.   Neck: Neck supple.   Normal range of motion.  Cardiovascular:  Normal rate, regular rhythm and intact distal pulses.           Pulmonary/Chest: Breath sounds normal. No stridor. No respiratory distress. He has no wheezes. He has no rales.   Abdominal: Abdomen is soft. Bowel sounds are normal. He exhibits no distension. There is no abdominal tenderness. There is no rebound.   Musculoskeletal:         General: No tenderness or edema. Normal range of motion.      Cervical back: Normal range of motion and neck supple.     Neurological: He is alert and oriented to person, place, and time. GCS score is 15. GCS eye subscore is 4. GCS verbal subscore is 5. GCS motor subscore is 6.   Skin: Skin is warm and dry. Capillary refill takes less than 2 seconds.   Psychiatric: He has a normal mood and affect. Thought content normal.         ED Course   Procedures  Labs Reviewed - No data to display       Imaging Results    None          Medications - No data to display  Medical Decision Making  Tolerating PO. Wants to be admitted for antibiotics to cover H pylori, despite negative test for same. Offered to arrange [place] [facility] [place] feeding [place], no surgery on call here, patient and wife opt to go home and follow up as outpatient.     Problems Addressed:  Anxiety about health: acute illness or injury  Dysphagia, unspecified type: chronic illness or injury                                      Clinical Impression:  Final diagnoses:  [R13.10] Dysphagia, unspecified type (Primary)  [F41.8] Anxiety about health          ED Disposition Condition    Discharge Stable          ED Prescriptions    None       Follow-up Information       Follow up With Specialties Details Why Contact Info    Codi Chavez, LEAHP Family Medicine Schedule an appointment as soon as possible for a visit    [address] [facility] [id number]  [phone]               Flavio Diaz MD  11/29/23 5888

## 2023-12-04 ENCOUNTER — PATIENT MESSAGE (OUTPATIENT)
Dept: HEMATOLOGY/ONCOLOGY | Facility: CLINIC | Age: 58
End: 2023-12-04
Payer: MEDICAID

## 2023-12-04 ENCOUNTER — TELEPHONE (OUTPATIENT)
Dept: HEMATOLOGY/ONCOLOGY | Facility: CLINIC | Age: 58
End: 2023-12-04
Payer: MEDICAID

## 2023-12-05 ENCOUNTER — DOCUMENTATION ONLY (OUTPATIENT)
Dept: HEMATOLOGY/ONCOLOGY | Facility: CLINIC | Age: 58
End: 2023-12-05
Payer: MEDICAID

## 2023-12-05 DIAGNOSIS — R13.19 OTHER DYSPHAGIA: Chronic | ICD-10-CM

## 2023-12-05 DIAGNOSIS — R63.4 UNINTENTIONAL WEIGHT LOSS: Chronic | ICD-10-CM

## 2023-12-05 DIAGNOSIS — K31.89 MASS OF GASTROESOPHAGEAL JUNCTION: Primary | ICD-10-CM

## 2023-12-05 NOTE — TELEPHONE ENCOUNTER
"Spoke with wife who reported that she has messaged his primary care regarding increased heart rate. She is waiting for a response. She reported that he is "dehydrated" and she has been providing fluids via the feeding tube to address. Encouraged her to phone the primary or use the ED if necessary. She denied any concerns or questions   "

## 2023-12-05 NOTE — PROGRESS NOTES
Notified by YULIYA that patient was discharged from hospital without TF/home infusion set up. YULIYA looped in [facility] as well. Unsure what type of tube patient had.  Called patient's wife. She reports he had PEG placed at OSH. She has been doing bolus TF with Boost and does not have any formula. She has 2 syringes OSH supplied her. Orders placed (Quan[employer] Standard 1.4 bolus 5 cartons/day). Discussed flushing with 60mL water before feeds and 120mL water after feeds. She felt this would be too much. We discussed she could flush with 30mL before and after feeds for now. Advised she needs to flush with 120mL 5x/day (in between feedings).  All questions addressed. Contact number provided.

## 2023-12-07 DIAGNOSIS — C7A.8 NEUROENDOCRINE CARCINOMA OF STOMACH: Primary | ICD-10-CM

## 2023-12-07 NOTE — PROGRESS NOTES
CC:  Metastatic neuroendocrine carcinoma of the stomach    HPI:  Mr Benjamin is a 57 yo man with a 65 pack year smoking yesterday (quit in ) who presents today for further management of metastatic gastric neuroendocrine carcinoma. He presents with progressive dysphagia and 30-lb weight loss for 3 weeks. He presented to [facility] and was transferred to [facility].   CT chest 23 showed mild pulmonary emphysema, bronchitis. Moderate to large size hiatal hernia containing the proximal stomach. CT A/P 23 showed A large solid mass which appears to be at least partially contiguous with the lesser curvature of the stomach, raising the question of gastric malignancy. There are multiple enlarged upper abdominal and periaortic lymph nodes, consistent with metastatic disease. Given the extent of michael enlargement, these findings raise the question of lymphoma with involvement of the stomach. An oval-shaped hypodense lesion in the right hepatic lobe, too small to further characterize. This could reflect a small cyst or metastatic lesion.  EGD on 23 showed a likely malignant tumor originating from the gastric cardia at 47 cm from the incisors. Extends proximally into the mid-esophagus. Proximal extent at 36 cm. LA grade C esophagitis. Duodenitis. Duodenal ulcers with a clean ulcer base. Pathology showed high grade neuroendocrine carcinoma. MMR intact. HER2 negative.  Tempus: NOTCH3, TP53, TMB low, MSI stable. PD-1 CPS<1  Patient had PEG tube placed at outside hospital and felt worse after that. A lot of abdominal pain. Leaking feeding tube.     ECO    No past medical history on file.     Past Surgical History:   Procedure Laterality Date    ESOPHAGOGASTRODUODENOSCOPY N/A 2023    Procedure: EGD (ESOPHAGOGASTRODUODENOSCOPY);  Surgeon: Princess Voss MD;  Location: [facility] ([address] [place] [address]);  Service: Gastroenterology;  Laterality: N/A;         Social History     Socioeconomic History    Marital  status:    Tobacco Use    Smoking status: Former     Types: Cigarettes   Substance and Sexual Activity    Alcohol use: Not Currently     Social Determinants of Health     Financial Resource Strain: Medium Risk (11/22/2023)    Overall Financial Resource Strain (CARDIA)     Difficulty of Paying Living Expenses: Somewhat hard   Food Insecurity: No Food Insecurity (11/22/2023)    Hunger Vital Sign     Worried About Running Out of Food in the Last Year: Never true     Ran Out of Food in the Last Year: Never true   Transportation Needs: No Transportation Needs (11/22/2023)    PRAPARE - Transportation     Lack of Transportation (Medical): No     Lack of Transportation (Non-Medical): No   Physical Activity: Inactive (11/22/2023)    Exercise Vital Sign     Days of Exercise per Week: 0 days     Minutes of Exercise per Session: 0 min   Stress: Stress Concern Present (11/22/2023)    [employer] - Occupational Stress Questionnaire     Feeling of Stress : Rather much   Social Connections: Moderately Isolated (11/22/2023)    Social Connection and Isolation Panel [NHANES]     Frequency of Communication with Friends and Family: More than three times a week     Frequency of Social Gatherings with Friends and Family: More than three times a week     Attends Sabianism Services: Never     Active Member of Clubs or Organizations: No     Attends Club or Organization Meetings: Never     Marital Status:    Housing Stability: Low Risk  (11/22/2023)    Housing Stability Vital Sign     Unable to Pay for Housing in the Last Year: No     Number of Places Lived in the Last Year: 1     Unstable Housing in the Last Year: No       Review of Systems   Constitutional:  Positive for appetite change, fatigue and unexpected weight change. Negative for chills and fever.   HENT:  Positive for trouble swallowing. Negative for mouth sores, nosebleeds, tinnitus and voice change.    Eyes:  Negative  for pain, redness and visual disturbance.   Respiratory:  Negative for cough, shortness of breath and wheezing.    Cardiovascular:  Negative for chest pain, palpitations and leg swelling.   Gastrointestinal:  Positive for abdominal distention, abdominal pain and nausea. Negative for blood in stool, constipation, diarrhea and vomiting.   Endocrine: Negative for polydipsia, polyphagia and polyuria.   Genitourinary:  Negative for flank pain, frequency and hematuria.   Musculoskeletal:  Positive for back pain. Negative for arthralgias, gait problem, joint swelling, myalgias, neck pain and neck stiffness.   Skin:  Negative for color change, pallor, rash and wound.   Neurological:  Positive for weakness. Negative for tremors, seizures, syncope, speech difficulty, light-headedness, numbness and headaches.   Hematological:  Negative for adenopathy. Does not bruise/bleed easily.   Psychiatric/Behavioral:  Negative for confusion, dysphoric mood and self-injury. The patient is not nervous/anxious.    All other systems reviewed and are negative.      Objective:  Physical Exam  Vitals reviewed.   Constitutional:       General: He is in acute distress (mild distress due to pain).      Appearance: He is well-developed. He is ill-appearing.   HENT:      Head: Normocephalic and atraumatic.      Mouth/Throat:      Pharynx: No oropharyngeal exudate.   Eyes:      General: No scleral icterus.     Conjunctiva/sclera: Conjunctivae normal.      Pupils: Pupils are equal, round, and reactive to light.   Neck:      Thyroid: No thyromegaly.      Vascular: No JVD.   Cardiovascular:      Rate and Rhythm: Normal rate and regular rhythm.      Heart sounds: Normal heart sounds. No murmur heard.     No friction rub. No gallop.   Pulmonary:      Effort: Pulmonary effort is normal. No respiratory distress.      Breath sounds: Normal breath sounds. No wheezing or rales.   Abdominal:      General: Bowel sounds are normal. There is distension.       Palpations: Abdomen is soft. There is no mass.      Tenderness: There is no abdominal tenderness. There is no rebound.      Hernia: No hernia is present.   Musculoskeletal:         General: No tenderness or deformity. Normal range of motion.      Cervical back: Normal range of motion and neck supple.   Lymphadenopathy:      Cervical: No cervical adenopathy.      Upper Body:      Right upper body: No supraclavicular adenopathy.      Left upper body: No supraclavicular adenopathy.   Skin:     General: Skin is warm and dry.      Coloration: Skin is not pale.      Findings: No erythema or rash.   Neurological:      Mental Status: He is alert and oriented to person, place, and time.      Cranial Nerves: No cranial nerve deficit.      Motor: No abnormal muscle tone.   Psychiatric:         Behavior: Behavior normal.         Thought Content: Thought content normal.         Judgment: Judgment normal.       Labs:  Labs reviewed. K 5.5. Cr 1.7    Imaging Data:  FDG PET 12/12/23:  FINDINGS:  Patient was administered 13.04 cc of FDG intravenously.     There is bulky malignant lymphadenopathy seen throughout the lower mediastinum, abdomen, and omentum.  There is milder lymphadenopathy of the chest, and pelvis.  There is a sacral metastasis and there are a few right rib metastases.     Index lower mediastinum SUV max 13.03.     Index para-aortic level of the upper kidneys SUV max 11.45.     Index omentum anterior midline SUV max 8.73.     Index left sacrum SUV max 10.31.     Intracranial structures are unremarkable.  Head and neck activity is unremarkable.  There is muscle and vocal cord activation.  There is a central line.  Liver, gallbladder, biliary tree, spleen, and kidneys demonstrate nothing unusual.  There are diffuse peritoneal metastases.  There is ascites.  The pancreas is not well visualized.  No definite pancreatic abnormality seen.  Adrenal activity is unremarkable.     Impression:     Extensive lower thoracic and  abdominal lymphadenopathy with smaller lymph nodes of the chest.  There is also extensive peritoneal omental metastases and there are bone metastases.    Assessment and plan:    1. Primary neuroendocrine carcinoma of stomach    2. 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 Peritoneal carcinomatosis    5. Secondary malignant neoplasm of bone    6. Immunodeficiency secondary to neoplasm    7. Anemia, unspecified type    8. Dysphagia, unspecified type    9. PEG tube malfunction    10. PERLA (acute kidney injury)        1-6  - Mr Bautista is a 59 yo man with metastatic gastric neuroendocrine carcinoma with bulky LAD, omental and bone mets  -Long discussion with patient and wife re the diagnosis, natural history and prognosis of stage IV gastric NEC. Cancer is not curable but treatable. The goal of treatment is palliative, with hopes of containing the cancer, slowing down growth, and prolonging life.   - discussed carbo/etop. The side effects of chemotherapy were discussed with patient, which include but are not limited to hair loss, fatigue, decreased appetite, weight loss, weakness, bone marrow suppression, increased risk of infection, sepsis, anemia that may require blood transfusion, low platelet counts with increased risk of bleeding that may require platelet transfusion, diarrhea, constipation, mucositis, damage to the brain, heart, liver, kidney, damage to the nerves that may not be reversible, death. Handouts provided to patient.  - patient would like to try chemo  - discussed zometa every 3 months for bone mets. He agrees. He does not have teeth  - emla, zofran sent to pharmacy  - he has port  - return to start chemo after he gets better    7.  - check ferritin on return    8-10  - today he has PERLA, hyperkalemia due to malfunctioning PEG tube. He is not able to get any nutrition and is in significant pain  - sent to ER. Will need IVF, monitor renal  function, consult GI/surgery for malfunctioning PEG tube  - needs inpatient palliative to follow    Route Chart for Scheduling    Med Onc Chart Routing  Urgent    Follow up with physician . Verify with MD re carbo/etop, zometa. see NP in 2 weeks with CBC, CMP, get chemo. please ask  Shraddha Basurto to set up [facility]   Follow up with BRYCE    Infusion scheduling note   carbo/etop days 1-3 nabila 21 days, zometa every 3 months   Injection scheduling note    Labs CBC and CMP   Scheduling:  Preferred lab:  Lab interval: every 3 weeks     Imaging    Pharmacy appointment    Other referrals            Treatment Plan Information   OP CARBOPLATIN (AUC) + ETOPOSIDE Q3W   Mariela Lopez MD   Upcoming Treatment Dates - OP CARBOPLATIN (AUC) + ETOPOSIDE Q3W    12/18/2023       Chemotherapy       CARBOplatin (PARAPLATIN) in sodium chloride 0.9% 250 mL chemo infusion       etoposide (VEPESID) 100 mg/m2 = 230 mg in sodium chloride 0.9% 511.5 mL chemo infusion       Antiemetics       aprepitant (CINVANTI) injection 130 mg       palonosetron (ALOXI) 0.25 mg with Dexamethasone (DECADRON) 12 mg in NS 50 mL IVPB  12/19/2023       Chemotherapy       etoposide (VEPESID) 100 mg/m2 = 230 mg in sodium chloride 0.9% 511.5 mL chemo infusion       Antiemetics       dexAMETHasone IVPB 12 mg 50 mL  12/20/2023       Chemotherapy       etoposide (VEPESID) 100 mg/m2 = 230 mg in sodium chloride 0.9% 511.5 mL chemo infusion       Antiemetics       dexAMETHasone IVPB 12 mg 50 mL       Growth Factor       pegfilgrastim (NEULASTA (ON BODY INJECTOR)) injection 6 mg  1/8/2024       Chemotherapy       CARBOplatin (PARAPLATIN) in sodium chloride 0.9% 250 mL chemo infusion       etoposide (VEPESID) 100 mg/m2 = 230 mg in sodium chloride 0.9% 511.5 mL chemo infusion       Antiemetics       aprepitant (CINVANTI) injection 130 mg       palonosetron (ALOXI) 0.25 mg with Dexamethasone (DECADRON) 12 mg in NS 50 mL IVPB    Supportive Plan Information  OP  ZOLEDRONIC ACID (ZOMETA) Q4W   Mariela Lopez MD   Upcoming Treatment Dates - OP ZOLEDRONIC ACID (ZOMETA) Q4W    12/18/2023       Medications       zoledronic acid (ZOMETA) 4 mg in sodium chloride 0.9% 100 mL IVPB  3/11/2024       Medications       zoledronic acid (ZOMETA) 4 mg in sodium chloride 0.9% 100 mL IVPB  6/3/2024       Medications       zoledronic acid (ZOMETA) 4 mg in sodium chloride 0.9% 100 mL IVPB  7/1/2024       Medications       zoledronic acid (ZOMETA) 4 mg in sodium chloride 0.9% 100 mL IVPB

## 2023-12-08 ENCOUNTER — TELEPHONE (OUTPATIENT)
Dept: HEMATOLOGY/ONCOLOGY | Facility: CLINIC | Age: 58
End: 2023-12-08
Payer: MEDICAID

## 2023-12-08 DIAGNOSIS — C7A.8: Primary | ICD-10-CM

## 2023-12-08 DIAGNOSIS — C7A.8: ICD-10-CM

## 2023-12-08 RX ORDER — OXYCODONE HYDROCHLORIDE 5 MG/1
5 TABLET ORAL EVERY 6 HOURS PRN
Qty: 120 TABLET | Refills: 0 | Status: SHIPPED | OUTPATIENT
Start: 2023-12-08 | End: 2023-12-08 | Stop reason: SDUPTHER

## 2023-12-08 RX ORDER — OXYCODONE HYDROCHLORIDE 5 MG/1
5 TABLET ORAL EVERY 6 HOURS PRN
Qty: 120 TABLET | Refills: 0 | Status: SHIPPED | OUTPATIENT
Start: 2023-12-08

## 2023-12-08 NOTE — TELEPHONE ENCOUNTER
----- Message from Mehran Moreira sent at 12/8/2023  1:43 PM CST -----  Contact: spouse @ [phone] or   Spouse called the Oncology referral line stating [facility] in [place] will not fill oxyCODONE (ROXICODONE) 5 MG immediate release tablet and is asking it go to pharmacy below    [id number] [place] [address] [address] AT [facility] [address]  [facility] [address]  [facility] [address]  Phone: [phone] Fax: [phone]

## 2023-12-08 NOTE — TELEPHONE ENCOUNTER
Patient has tried tylenol and tramadol left over from back surgery with no relief.     Pain has been bad since feeding tube was placed. Having issues sleeping and getting comfortable.

## 2023-12-08 NOTE — TELEPHONE ENCOUNTER
----- Message from Hitesh May sent at 12/8/2023  9:46 AM CST -----  Regarding: Callback  Patient  wife would like the nurse to give him a call back in regards to being in a lot of pain .    Pt wife stated would like some kind of medication for pain       Contact number [phone].

## 2023-12-08 NOTE — TELEPHONE ENCOUNTER
Spoke to patient's wife she picked up 30 pills, which will get him to his office visit and palliative care visit Tuesday.

## 2023-12-08 NOTE — TELEPHONE ENCOUNTER
----- Message from Mehran Moreira sent at 12/8/2023  3:41 PM CST -----  Contact: spouse @ [phone]  The patient's spouse called the Oncology referral line again stating the pharmacy below does not have enough of pain medication in stock to fill full prescription, but the pharmacy did fill 30 tablets. The spouse is also asking if our pharmacy can ship the medication to her. I advised I would send your office a message regarding that.    [id number] - [address] [facility] [address]  [facility] [address] [facility] [address]  Phone: [phone] Fax: [phone]

## 2023-12-08 NOTE — TELEPHONE ENCOUNTER
----- Message from Cristel Frances sent at 12/8/2023  1:45 PM CST -----  Regarding: Refill  Contact: Pt  [phone]          Name Of caller: [facility]     Name of Medication:  oxyCODONE (ROXICODONE) 5 MG immediate release tablet    Ordering Provider: Mariela Lopez MD     Comments/advisory:  [facility] called [facility] they are out of medication pt would like to script sent to [facility] in [place]

## 2023-12-10 LAB
DNA RANGE(S) EXAMINED NAR: NORMAL
GENE DIS ANL INTERP-IMP: POSITIVE
GENE DIS ASSESSED: NORMAL
GENE MUT TESTED BLD/T: 4.2 M/MB
MSI CA SPEC-IMP: NORMAL
PD-L1 BY 22C3 TISS IMSTN DOC: NEGATIVE
REASON FOR STUDY: NORMAL
TEMPUS FUSIONADDENDUM: NORMAL
TEMPUS PD-L1 (22C3) COMBINED POSITIVE SCORE: <1
TEMPUS PD-L1 (22C3) TUMOR PROPORTION SCORE: <1 %
TEMPUS PORTAL: NORMAL

## 2023-12-11 NOTE — PROGRESS NOTES
Palliative Medicine Clinic Note      Consult Requested By: Other    Primary Care Physician:   Codi Chavez FNP    Reason for Consult: Advance care planning and symptom management in the setting of High-grade NEC      ASSESSMENT/PLAN:     Plan/Recommendations:  Orders Placed This Encounter    OLANZapine (ZYPREXA) 5 MG tablet    ondansetron (ZOFRAN-ODT) 4 MG TbDL      Nausea  Has zofran tablets at home, difficult to swallow, especially when nauseated  Will swap to ODT zofran  START Olanzapine 5mg QHS for nausea  Concern for possibility of developing Gastric obstruction given size of mass on CT in November; obtaining PET today  Less likely today given passing flatus and BM, and not vomiting secretions.    Cancer Associated Pain/Abdominal Pain  Continue Oxycodone 5mg q8h PRN, reports this as adequately controlling pain  If pain needs increased, may benefit from fentanyl patch given difficulty with alimentary tract.     Constipation  CAPC PRN constipation escalation pathway provided and explained to patient    High-grade NEC  Discussed the role of palliative care, in that we can be helpful in helping patients feel better, aid in communication, and planning for future difficult decisions.    Deferred further discussion of treatment to his oncologist, which he will meet in clinic this afternoon 12/12/23.  Discussed that treatment uncertain pending PET-CT. Broadly discussed his large lesion of the stomach and possible lymph node involvement.     Advance Care Planning   Advance Directives:     Decision Making:  Patient answered questions  Goals of Care: The patient endorses that what is most important right now is to focus on symptom/pain control    Accordingly, we have decided that the best plan to meet the patient's goals includes continuing with treatment               Understanding of disease and Illness Trajectory: Patient  has  minimal understanding of his illness, they can benefit from continued education on what  to expect in the future.      16 min time was spent on advance care planning, goals of care discussion, emotional support, formulating and communicating prognosis and goals of care, exploring burden/benefit of various approaches of treatment.        Follow up: No follow-ups on file.    Plan discussed with Dr. Lopez    SUBJECTIVE:     History obtained from: Patient, Wife Adela, Past Medical Records     complaint: No chief complaint on file.        History of Present Illness / Interval History:  David Benjamin Jr. is 58 y.o. year old male presenting with High grade neuro-endocrine tumor on recent EGD biopsy and 11x9cm mass of stomach.  Referred to Palliative Care for evaluation and management of physical symptoms, advance care planning,, and additional support..       12/12/2023: Patient appears visibly uncomfortable and will talk for a few moments at a time, then returns to resting his head on his arm balanced on his cane. His wife delivers most of the history. They both note that this seemed to come out of nowhere. He states he started noticing food would occasionally feel like it got stuck 2 months ago, with this becoming an increasingly frequent occurrence up until he was hospitalized and the mass in his stomach was found. Since returning home, he has had a PEG tube and a port placed in [place] (Records do not appear in care everywhere). They both report that he has had many issues since the PEG placement, noting that now it seems like there is a continuous leakage of brownish fluid from the insertion site and the tube itself seems clogged. He notes that he has had an increase in belching and abdominal pain since the PEG tube was adjusted at a follow up visit 12/11/23. He reports feeling light headed, and his wife expressed that he is so nauseated that he cannot keep anything down. Today, he is fasting in anticipation of a PET scan and while he is trying to drink as much water as he can, he is worried he  feels like he may pass out. With regards to his cancer pain, he notes that the oxycodone 5mg seems to be an effective dose for controlling his pain when he takes it. His wife notes that after he started taking it, he had a good nights sleep for the first time. He still struggles with getting to sleep or staying asleep at night.      Review of Symptoms      Symptom Assessment (ESAS 0-10 Scale)  Pain:  10  Dyspnea:  8  Anxiety:  7  Nausea:  8  Depression:  5  Anorexia:  10  Fatigue:  10  Insomnia:  7  Restlessness:  0  Agitation:  0         Pain Assessment:    Location(s): abdomen    Abdomen       Location: anterior        Quality: Cramping and pressure-like        Quantity: 10/10 in intensity        Chronicity: Onset 2 month(s) ago, gradually worsening        Aggravating Factors: Eating        Alleviating Factors: Opiates and recumbency        Associated Symptoms: Belching and nausea    Performance Status:  60    Living Arrangements:  Lives with spouse, Lives in home and Lives >50 miles from facility    Psychosocial/Cultural:   See Palliative Psychosocial Note: Yes  Worked as /frame  for years in [employer] and [employer]  , step children  Sister in [place]  Had been helping care for his elderly parents  **Primary  to Follow**  Palliative Care  Consult: Yes            Disease History:  patient is a 58 year old who was a heavy prior smoker who presented with dysphagia and weight loss. Imaging showed a large gastric mass with associated lymphadenopathy. GI was consulted and he underwent EGD on 11/21/23 with biopsy of malignant-appearing mass, pathology showing High-grade neuroendocrine carcinoma. Initial visit with Dr. Lopez 12/12/23     Previous experience or exposure to a serious illness: No        Medications:    Current Outpatient Medications:     clarithromycin (BIAXIN) 250 mg/5 mL suspension, Take 10 mLs by mouth 2 (two) times daily., Disp: , Rfl:      LIKMEZ 500 mg/5 mL Susp, Take 5 mLs by mouth 2 (two) times daily., Disp: , Rfl:     omeprazole (PRILOSEC) 40 MG capsule, Take 40 mg by mouth., Disp: , Rfl:     oxyCODONE (ROXICODONE) 5 MG immediate release tablet, Take 1 tablet (5 mg total) by mouth every 6 (six) hours as needed for Pain., Disp: 120 tablet, Rfl: 0    pantoprazole (PROTONIX) 40 MG tablet, Take 1 tablet (40 mg total) by mouth 2 (two) times daily before meals., Disp: 60 tablet, Rfl: 0    OLANZapine (ZYPREXA) 5 MG tablet, Take 1 tablet (5 mg total) by mouth every evening., Disp: 30 tablet, Rfl: 11    ondansetron (ZOFRAN-ODT) 4 MG TbDL, Take 2 tablets (8 mg total) by mouth every 8 (eight) hours as needed (Nausea)., Disp: 90 tablet, Rfl: 0      External  database queried on 12/12/23   by Remi Guzmán .   The results reviewed and considered with the clinical data in the decision whether or not to prescribe a controlled substance.       No past medical history on file.  Past Surgical History:   Procedure Laterality Date    ESOPHAGOGASTRODUODENOSCOPY N/A 11/21/2023    Procedure: EGD (ESOPHAGOGASTRODUODENOSCOPY);  Surgeon: Princess Voss MD;  Location: [address];  Service: Gastroenterology;  Laterality: N/A;     No family history on file.  Review of patient's allergies indicates:   Allergen Reactions    Nsaids (non-steroidal anti-inflammatory drug) Hives     With ASA and Ibuprofen       OBJECTIVE:       Physical Exam:  Vitals: Pulse: 105 (12/12/23 1014)  BP: (!) 89/50 (12/12/23 1014)  SpO2: 98 % (12/12/23 1014)  Physical Exam  Constitutional:       General: He is not in acute distress.     Appearance: He is not diaphoretic.   HENT:      Head: Normocephalic and atraumatic.   Eyes:      General: No scleral icterus.     Conjunctiva/sclera: Conjunctivae normal.      Comments: Bilateral xanthomas   Neck:      Thyroid: No thyromegaly.   Pulmonary:      Effort: Pulmonary effort is normal. No respiratory distress.   Abdominal:      General: Bowel  sounds are normal. There is distension.      Comments: PEG tube, dressing stained with fresh brown exudate. No visible erythema or purulence   Skin:     General: Skin is warm and dry.      Capillary Refill: Capillary refill takes 2 to 3 seconds.      Findings: No erythema or rash.      Comments: Skin tenting of dorsal hand   Neurological:      Mental Status: He is alert and oriented to person, place, and time.      Motor: Weakness (generalized) present.   Psychiatric:         Mood and Affect: Affect normal.         Judgment: Judgment normal.         Labs:  CBC:   WBC   Date Value Ref Range Status   11/22/2023 11.05 3.90 - 12.70 K/uL Final       Hemoglobin   Date Value Ref Range Status   11/22/2023 11.6 (L) 14.0 - 18.0 g/dL Final       Hematocrit   Date Value Ref Range Status   11/22/2023 35.2 (L) 40.0 - 54.0 % Final       MCV   Date Value Ref Range Status   11/22/2023 87 82 - 98 fL Final       Platelets   Date Value Ref Range Status   11/22/2023 211 150 - 450 K/uL Final       Lab Results   Component Value Date    CREATININE 0.7 11/22/2023    CREATININE 0.7 11/22/2023    BUN 10 11/22/2023    BUN 10 11/22/2023     11/22/2023     11/22/2023    K 4.0 11/22/2023    K 4.0 11/22/2023     11/22/2023     11/22/2023    CO2 20 (L) 11/22/2023    CO2 20 (L) 11/22/2023        LFT:   Lab Results   Component Value Date    AST 40 11/22/2023    AST 40 11/22/2023    ALKPHOS 63 11/22/2023    ALKPHOS 63 11/22/2023    BILITOT 0.5 11/22/2023    BILITOT 0.5 11/22/2023       Albumin:   Albumin   Date Value Ref Range Status   11/22/2023 2.8 (L) 3.5 - 5.2 g/dL Final   11/22/2023 2.8 (L) 3.5 - 5.2 g/dL Final     Protein:   Total Protein   Date Value Ref Range Status   11/22/2023 5.7 (L) 6.0 - 8.4 g/dL Final   11/22/2023 5.7 (L) 6.0 - 8.4 g/dL Final     Final Pathologic Diagnosis     Designated &quot;Esophageal mass,&quot; biopsy:  - High-grade neuroendocrine carcinoma (see comment)  - Mismatch repair marker and HER2 IHC  pending; results to follow in a supplemental report  - Next generation sequencing and PD-L1 (clone [id number]) IHC through [facility] is pending; results to be issued in a separate report      Comment:  The overall features are not entirely specific with regards to site of origin. While this lesion may represent a primary high-grade neuroendocrine carcinoma (small cell carcinoma) of the stomach, the possibility of metastasis from an alternate primary  site cannot be excluded. Clinical and radiological correlation is required.      The above findings were reported to Dr. Jeronimo via tu.nr patient call back message at 1607 hrs on 11/22/23. This case was reviewed in consultation with Dr. Syed, who concurs with the above findings. VC      Comment: Interp By Jesus Nagy M.D., Signed on 11/24/2023 at 11:49       Radiology:  CT Abdomen Pelvis With IV Contrast  Narrative: EXAMINATION:  CT ABDOMEN PELVIS WITH IV CONTRAST    CLINICAL HISTORY:  Dysphagia, 30 lb weight loss in 3 weeks;    TECHNIQUE:  Axial CT images were obtained. Iterative reconstruction technique was used. CT/cardiac nuclear exam/s in prior 12 months: 0.    COMPARISON:  CT chest with IV contrast 11/20/2023.    FINDINGS:  In the right hepatic lobe, a 1.3 cm oval-shaped hypodense lesion, too small to further characterize (image 41 of series 4 axial).    No gallstones.  The spleen, pancreas, adrenal glands and kidneys demonstrate no abnormality.    There is a moderate to large size hiatal hernia containing the proximal stomach.  There are multiple solid masses in the upper abdomen including along the lesser curvature of the stomach.  Some of these masses appear to be contiguous with the stomach (for example, image 32 of series 4 axial).    Some of these masses appear separate and others appear contiguous.  The largest component of the mass appears to measure approximately 11 x 8 cm (image 33 of series 4 axial).  This measures approximately 8 cm in craniocaudal  dimension (image 75 of series 8 coronal).    There are no dilated loops of bowel.  The appendix is not visualized.  No pericecal inflammatory change.  Small amount of free fluid in the pelvis.  No free air.    There are urinary bladder diverticula.    No aortic aneurysm.    Multiple enlarged upper abdominal and periaortic lymph nodes, some of which appear contiguous.  A large michael mass in the upper abdomen measures approximately 7 x 5.5 cm (image 46 of series 4 axial).    Visualized lung bases will be discussed in the chest CT report of same day.  There is no osseous abnormality.  Impression: 1. A large solid mass which appears to be at least partially contiguous with the lesser curvature of the stomach, raising the question of gastric malignancy.  2. There are multiple enlarged upper abdominal and periaortic lymph nodes, consistent with metastatic disease.  Given the extent of michael enlargement, these findings raise the question of lymphoma with involvement of the stomach.  3. An oval-shaped hypodense lesion in the right hepatic lobe, too small to further characterize.  This could reflect a small cyst or metastatic lesion.    Electronically signed by: Travis Reyez MD  Date:    11/20/2023  Time:    14:44  CT Chest With Contrast  Narrative: EXAMINATION:  CT CHEST WITH CONTRAST    CLINICAL HISTORY:  Dysphagia, 30 lb weight loss over 3 weeks;    TECHNIQUE:  Axial CT images were obtained. Iterative reconstruction technique was used.  CT/cardiac nuclear exam/s in prior 12 months: 0.    FINDINGS:  Breathing motion artifact on some images.    There is mucosal thickening in the airways bilaterally.    Mild pulmonary emphysema.  There is no pleural or pericardial fluid.  No aortic aneurysm.  No hilar or mediastinal lymph node enlargement.  There is a moderate to large size hiatal hernia containing the proximal stomach.  The visualized upper abdomen will be discussed in the abdomen pelvis CT report of same day.  No  axillary lymph node enlargement.  No osseous abnormality.  Impression: 1. Mild pulmonary emphysema.  2. Bronchitis.  3. Moderate to large size hiatal hernia containing the proximal stomach.    Electronically signed by: Travis Reyez MD  Date:    11/20/2023  Time:    14:35       I spent a total of 60 minutes on the day of the visit. This includes face to face time in discussion of goals of care, symptom assessment, coordination of care and emotional support.  This also includes non-face to face time preparing to see the patient (eg, review of tests/imaging), obtaining and/or reviewing separately obtained history, documenting clinical information in the electronic or other health record, independently interpreting results and communicating results to the patient/family/caregiver, or care coordinator.     16 minutes spent in discussing ACP    This note was partially created using voice recognition software. Typographical and content errors may occur with this process. While efforts are made to detect and correct such errors, in some cases errors will persist. For this reason, wording in this document should be considered in the proper context and not strictly verbatim.      Signature: Remi Guzmán,

## 2023-12-12 ENCOUNTER — HOSPITAL ENCOUNTER (INPATIENT)
Facility: HOSPITAL | Age: 58
LOS: 7 days | Discharge: HOSPICE | DRG: 871 | End: 2023-12-19
Attending: EMERGENCY MEDICINE | Admitting: INTERNAL MEDICINE
Payer: MEDICAID

## 2023-12-12 ENCOUNTER — OFFICE VISIT (OUTPATIENT)
Dept: HEMATOLOGY/ONCOLOGY | Facility: CLINIC | Age: 58
DRG: 871 | End: 2023-12-12
Payer: MEDICAID

## 2023-12-12 ENCOUNTER — HOSPITAL ENCOUNTER (OUTPATIENT)
Dept: RADIOLOGY | Facility: HOSPITAL | Age: 58
Discharge: HOME | DRG: 871 | End: 2023-12-12
Attending: INTERNAL MEDICINE
Payer: MEDICAID

## 2023-12-12 ENCOUNTER — OFFICE VISIT (OUTPATIENT)
Dept: PALLIATIVE MEDICINE | Facility: CLINIC | Age: 58
DRG: 871 | End: 2023-12-12
Payer: MEDICAID

## 2023-12-12 VITALS
BODY MASS INDEX: 29.67 KG/M2 | HEART RATE: 102 BPM | DIASTOLIC BLOOD PRESSURE: 56 MMHG | HEIGHT: 73 IN | SYSTOLIC BLOOD PRESSURE: 97 MMHG | OXYGEN SATURATION: 99 %

## 2023-12-12 VITALS
HEART RATE: 105 BPM | BODY MASS INDEX: 29.8 KG/M2 | OXYGEN SATURATION: 98 % | WEIGHT: 224.88 LBS | DIASTOLIC BLOOD PRESSURE: 50 MMHG | SYSTOLIC BLOOD PRESSURE: 89 MMHG | HEIGHT: 73 IN

## 2023-12-12 DIAGNOSIS — K31.89 MASS OF GASTROESOPHAGEAL JUNCTION: ICD-10-CM

## 2023-12-12 DIAGNOSIS — C78.6 PERITONEAL CARCINOMATOSIS: ICD-10-CM

## 2023-12-12 DIAGNOSIS — D64.9 ANEMIA, UNSPECIFIED TYPE: ICD-10-CM

## 2023-12-12 DIAGNOSIS — D49.9 IMMUNODEFICIENCY SECONDARY TO NEOPLASM: ICD-10-CM

## 2023-12-12 DIAGNOSIS — N17.9 ACUTE KIDNEY INJURY: ICD-10-CM

## 2023-12-12 DIAGNOSIS — A41.9 SEPSIS, DUE TO UNSPECIFIED ORGANISM, UNSPECIFIED WHETHER ACUTE ORGAN DYSFUNCTION PRESENT: Primary | ICD-10-CM

## 2023-12-12 DIAGNOSIS — C77.8 SECONDARY MALIGNANT NEOPLASM OF LYMPH NODES OF MULTIPLE SITES: ICD-10-CM

## 2023-12-12 DIAGNOSIS — R00.0 TACHYCARDIA: ICD-10-CM

## 2023-12-12 DIAGNOSIS — R65.21 SEPTIC SHOCK: ICD-10-CM

## 2023-12-12 DIAGNOSIS — K94.23 PEG TUBE MALFUNCTION: ICD-10-CM

## 2023-12-12 DIAGNOSIS — N17.9 AKI (ACUTE KIDNEY INJURY): ICD-10-CM

## 2023-12-12 DIAGNOSIS — R13.10 DYSPHAGIA, UNSPECIFIED TYPE: ICD-10-CM

## 2023-12-12 DIAGNOSIS — A41.9 SEPTIC SHOCK: ICD-10-CM

## 2023-12-12 DIAGNOSIS — I95.9 HYPOTENSION: ICD-10-CM

## 2023-12-12 DIAGNOSIS — D84.81 IMMUNODEFICIENCY SECONDARY TO NEOPLASM: ICD-10-CM

## 2023-12-12 DIAGNOSIS — C7A.8: Primary | ICD-10-CM

## 2023-12-12 DIAGNOSIS — C7A.8 NEUROENDOCRINE CARCINOMA OF STOMACH: ICD-10-CM

## 2023-12-12 DIAGNOSIS — R11.0 NAUSEA: Primary | ICD-10-CM

## 2023-12-12 DIAGNOSIS — G89.3 CANCER ASSOCIATED PAIN: ICD-10-CM

## 2023-12-12 DIAGNOSIS — C7A.1 HIGH GRADE NEUROENDOCRINE CARCINOMA: ICD-10-CM

## 2023-12-12 DIAGNOSIS — C79.51 SECONDARY MALIGNANT NEOPLASM OF BONE: ICD-10-CM

## 2023-12-12 LAB
ALLENS TEST: NORMAL
BILIRUB UR QL STRIP: NEGATIVE
CLARITY UR REFRACT.AUTO: CLEAR
COLOR UR AUTO: YELLOW
GLUCOSE UR QL STRIP: NEGATIVE
HCV AB SERPL QL IA: NORMAL
HGB UR QL STRIP: NEGATIVE
HIV 1+2 AB+HIV1 P24 AG SERPL QL IA: NORMAL
KETONES UR QL STRIP: ABNORMAL
LDH SERPL L TO P-CCNC: 1.79 MMOL/L (ref 0.5–2.2)
LEUKOCYTE ESTERASE UR QL STRIP: NEGATIVE
LIPASE SERPL-CCNC: 18 U/L (ref 4–60)
NITRITE UR QL STRIP: NEGATIVE
PH UR STRIP: 5 [PH] (ref 5–8)
PROCALCITONIN SERPL IA-MCNC: 0.64 NG/ML
PROT UR QL STRIP: ABNORMAL
SAMPLE: NORMAL
SITE: NORMAL
SP GR UR STRIP: >1.03 (ref 1–1.03)
URN SPEC COLLECT METH UR: ABNORMAL

## 2023-12-12 PROCEDURE — 99205 PR OFFICE/OUTPT VISIT, NEW, LEVL V, 60-74 MIN: ICD-10-PCS | Mod: S$PBB,,, | Performed by: STUDENT IN AN ORGANIZED HEALTH CARE EDUCATION/TRAINING PROGRAM

## 2023-12-12 PROCEDURE — 1159F PR MEDICATION LIST DOCUMENTED IN MEDICAL RECORD: ICD-10-PCS | Mod: CPTII,,, | Performed by: INTERNAL MEDICINE

## 2023-12-12 PROCEDURE — 99213 OFFICE O/P EST LOW 20 MIN: CPT | Mod: PBBFAC,25,27 | Performed by: INTERNAL MEDICINE

## 2023-12-12 PROCEDURE — 99999 PR PBB SHADOW E&M-EST. PATIENT-LVL III: ICD-10-PCS | Mod: PBBFAC,,, | Performed by: STUDENT IN AN ORGANIZED HEALTH CARE EDUCATION/TRAINING PROGRAM

## 2023-12-12 PROCEDURE — 3074F PR MOST RECENT SYSTOLIC BLOOD PRESSURE < 130 MM HG: ICD-10-PCS | Mod: CPTII,,, | Performed by: INTERNAL MEDICINE

## 2023-12-12 PROCEDURE — 3074F PR MOST RECENT SYSTOLIC BLOOD PRESSURE < 130 MM HG: ICD-10-PCS | Mod: CPTII,,, | Performed by: STUDENT IN AN ORGANIZED HEALTH CARE EDUCATION/TRAINING PROGRAM

## 2023-12-12 PROCEDURE — 99213 OFFICE O/P EST LOW 20 MIN: CPT | Mod: PBBFAC,25 | Performed by: STUDENT IN AN ORGANIZED HEALTH CARE EDUCATION/TRAINING PROGRAM

## 2023-12-12 PROCEDURE — 1159F PR MEDICATION LIST DOCUMENTED IN MEDICAL RECORD: ICD-10-PCS | Mod: CPTII,,, | Performed by: STUDENT IN AN ORGANIZED HEALTH CARE EDUCATION/TRAINING PROGRAM

## 2023-12-12 PROCEDURE — 83605 ASSAY OF LACTIC ACID: CPT

## 2023-12-12 PROCEDURE — 99205 PR OFFICE/OUTPT VISIT, NEW, LEVL V, 60-74 MIN: ICD-10-PCS | Mod: S$PBB,,, | Performed by: INTERNAL MEDICINE

## 2023-12-12 PROCEDURE — 93005 ELECTROCARDIOGRAM TRACING: CPT

## 2023-12-12 PROCEDURE — A9552 F18 FDG: HCPCS

## 2023-12-12 PROCEDURE — 1111F DSCHRG MED/CURRENT MED MERGE: CPT | Mod: CPTII,,, | Performed by: INTERNAL MEDICINE

## 2023-12-12 PROCEDURE — 3008F PR BODY MASS INDEX (BMI) DOCUMENTED: ICD-10-PCS | Mod: CPTII,,, | Performed by: STUDENT IN AN ORGANIZED HEALTH CARE EDUCATION/TRAINING PROGRAM

## 2023-12-12 PROCEDURE — 1111F PR DISCHARGE MEDS RECONCILED W/ CURRENT OUTPATIENT MED LIST: ICD-10-PCS | Mod: CPTII,,, | Performed by: INTERNAL MEDICINE

## 2023-12-12 PROCEDURE — 93010 EKG 12-LEAD: ICD-10-PCS | Mod: ,,, | Performed by: INTERNAL MEDICINE

## 2023-12-12 PROCEDURE — 1160F RVW MEDS BY RX/DR IN RCRD: CPT | Mod: CPTII,,, | Performed by: INTERNAL MEDICINE

## 2023-12-12 PROCEDURE — 84145 PROCALCITONIN (PCT): CPT | Performed by: EMERGENCY MEDICINE

## 2023-12-12 PROCEDURE — 25000003 PHARM REV CODE 250: Performed by: EMERGENCY MEDICINE

## 2023-12-12 PROCEDURE — 3078F DIAST BP <80 MM HG: CPT | Mod: CPTII,,, | Performed by: STUDENT IN AN ORGANIZED HEALTH CARE EDUCATION/TRAINING PROGRAM

## 2023-12-12 PROCEDURE — 99205 OFFICE O/P NEW HI 60 MIN: CPT | Mod: S$PBB,,, | Performed by: INTERNAL MEDICINE

## 2023-12-12 PROCEDURE — 96375 TX/PRO/DX INJ NEW DRUG ADDON: CPT

## 2023-12-12 PROCEDURE — 99999 PR PBB SHADOW E&M-EST. PATIENT-LVL III: CPT | Mod: PBBFAC,,, | Performed by: INTERNAL MEDICINE

## 2023-12-12 PROCEDURE — 93010 ELECTROCARDIOGRAM REPORT: CPT | Mod: ,,, | Performed by: INTERNAL MEDICINE

## 2023-12-12 PROCEDURE — 3074F SYST BP LT 130 MM HG: CPT | Mod: CPTII,,, | Performed by: INTERNAL MEDICINE

## 2023-12-12 PROCEDURE — 3074F SYST BP LT 130 MM HG: CPT | Mod: CPTII,,, | Performed by: STUDENT IN AN ORGANIZED HEALTH CARE EDUCATION/TRAINING PROGRAM

## 2023-12-12 PROCEDURE — 1111F DSCHRG MED/CURRENT MED MERGE: CPT | Mod: CPTII,,, | Performed by: STUDENT IN AN ORGANIZED HEALTH CARE EDUCATION/TRAINING PROGRAM

## 2023-12-12 PROCEDURE — 96361 HYDRATE IV INFUSION ADD-ON: CPT

## 2023-12-12 PROCEDURE — 86803 HEPATITIS C AB TEST: CPT | Performed by: PHYSICIAN ASSISTANT

## 2023-12-12 PROCEDURE — 99999 PR PBB SHADOW E&M-EST. PATIENT-LVL III: CPT | Mod: PBBFAC,,, | Performed by: STUDENT IN AN ORGANIZED HEALTH CARE EDUCATION/TRAINING PROGRAM

## 2023-12-12 PROCEDURE — 96367 TX/PROPH/DG ADDL SEQ IV INF: CPT

## 2023-12-12 PROCEDURE — 99900035 HC TECH TIME PER 15 MIN (STAT)

## 2023-12-12 PROCEDURE — 3078F PR MOST RECENT DIASTOLIC BLOOD PRESSURE < 80 MM HG: ICD-10-PCS | Mod: CPTII,,, | Performed by: INTERNAL MEDICINE

## 2023-12-12 PROCEDURE — 3078F DIAST BP <80 MM HG: CPT | Mod: CPTII,,, | Performed by: INTERNAL MEDICINE

## 2023-12-12 PROCEDURE — 3008F BODY MASS INDEX DOCD: CPT | Mod: CPTII,,, | Performed by: INTERNAL MEDICINE

## 2023-12-12 PROCEDURE — 63600175 PHARM REV CODE 636 W HCPCS: Performed by: EMERGENCY MEDICINE

## 2023-12-12 PROCEDURE — 96365 THER/PROPH/DIAG IV INF INIT: CPT

## 2023-12-12 PROCEDURE — 1160F PR REVIEW ALL MEDS BY PRESCRIBER/CLIN PHARMACIST DOCUMENTED: ICD-10-PCS | Mod: CPTII,,, | Performed by: INTERNAL MEDICINE

## 2023-12-12 PROCEDURE — 78815 NM PET CT FDG SKULL BASE TO MID THIGH: ICD-10-PCS | Mod: 26,PI,, | Performed by: RADIOLOGY

## 2023-12-12 PROCEDURE — 99497 ADVNCD CARE PLAN 30 MIN: CPT | Mod: PBBFAC | Performed by: STUDENT IN AN ORGANIZED HEALTH CARE EDUCATION/TRAINING PROGRAM

## 2023-12-12 PROCEDURE — 25500020 PHARM REV CODE 255: Performed by: EMERGENCY MEDICINE

## 2023-12-12 PROCEDURE — 3008F PR BODY MASS INDEX (BMI) DOCUMENTED: ICD-10-PCS | Mod: CPTII,,, | Performed by: INTERNAL MEDICINE

## 2023-12-12 PROCEDURE — 99497 PR ADVNCD CARE PLAN 30 MIN: ICD-10-PCS | Mod: S$PBB,,, | Performed by: STUDENT IN AN ORGANIZED HEALTH CARE EDUCATION/TRAINING PROGRAM

## 2023-12-12 PROCEDURE — 1159F MED LIST DOCD IN RCRD: CPT | Mod: CPTII,,, | Performed by: INTERNAL MEDICINE

## 2023-12-12 PROCEDURE — 3078F PR MOST RECENT DIASTOLIC BLOOD PRESSURE < 80 MM HG: ICD-10-PCS | Mod: CPTII,,, | Performed by: STUDENT IN AN ORGANIZED HEALTH CARE EDUCATION/TRAINING PROGRAM

## 2023-12-12 PROCEDURE — 12000002 HC ACUTE/MED SURGE SEMI-PRIVATE ROOM

## 2023-12-12 PROCEDURE — 99205 OFFICE O/P NEW HI 60 MIN: CPT | Mod: S$PBB,,, | Performed by: STUDENT IN AN ORGANIZED HEALTH CARE EDUCATION/TRAINING PROGRAM

## 2023-12-12 PROCEDURE — 87040 BLOOD CULTURE FOR BACTERIA: CPT | Performed by: EMERGENCY MEDICINE

## 2023-12-12 PROCEDURE — 96374 THER/PROPH/DIAG INJ IV PUSH: CPT | Mod: 59

## 2023-12-12 PROCEDURE — 87389 HIV-1 AG W/HIV-1&-2 AB AG IA: CPT | Performed by: PHYSICIAN ASSISTANT

## 2023-12-12 PROCEDURE — 1159F MED LIST DOCD IN RCRD: CPT | Mod: CPTII,,, | Performed by: STUDENT IN AN ORGANIZED HEALTH CARE EDUCATION/TRAINING PROGRAM

## 2023-12-12 PROCEDURE — 99497 ADVNCD CARE PLAN 30 MIN: CPT | Mod: S$PBB,,, | Performed by: STUDENT IN AN ORGANIZED HEALTH CARE EDUCATION/TRAINING PROGRAM

## 2023-12-12 PROCEDURE — 81003 URINALYSIS AUTO W/O SCOPE: CPT | Performed by: EMERGENCY MEDICINE

## 2023-12-12 PROCEDURE — 99999 PR PBB SHADOW E&M-EST. PATIENT-LVL III: ICD-10-PCS | Mod: PBBFAC,,, | Performed by: INTERNAL MEDICINE

## 2023-12-12 PROCEDURE — 78815 PET IMAGE W/CT SKULL-THIGH: CPT | Mod: 26,PI,, | Performed by: RADIOLOGY

## 2023-12-12 PROCEDURE — 1111F PR DISCHARGE MEDS RECONCILED W/ CURRENT OUTPATIENT MED LIST: ICD-10-PCS | Mod: CPTII,,, | Performed by: STUDENT IN AN ORGANIZED HEALTH CARE EDUCATION/TRAINING PROGRAM

## 2023-12-12 PROCEDURE — 3008F BODY MASS INDEX DOCD: CPT | Mod: CPTII,,, | Performed by: STUDENT IN AN ORGANIZED HEALTH CARE EDUCATION/TRAINING PROGRAM

## 2023-12-12 PROCEDURE — 83690 ASSAY OF LIPASE: CPT | Performed by: EMERGENCY MEDICINE

## 2023-12-12 RX ORDER — DEXAMETHASONE 4 MG/1
8 TABLET ORAL SEE ADMIN INSTRUCTIONS
Qty: 48 TABLET | Refills: 0 | Status: SHIPPED | OUTPATIENT
Start: 2023-12-12 | End: 2023-12-12

## 2023-12-12 RX ORDER — LIDOCAINE 50 MG/G
1 PATCH TOPICAL
Status: COMPLETED | OUTPATIENT
Start: 2023-12-12 | End: 2023-12-13

## 2023-12-12 RX ORDER — NOREPINEPHRINE BITARTRATE/D5W 4MG/250ML
0-.2 PLASTIC BAG, INJECTION (ML) INTRAVENOUS CONTINUOUS
Status: DISPENSED | OUTPATIENT
Start: 2023-12-13 | End: 2023-12-13

## 2023-12-12 RX ORDER — MORPHINE SULFATE 2 MG/ML
6 INJECTION, SOLUTION INTRAMUSCULAR; INTRAVENOUS
Status: COMPLETED | OUTPATIENT
Start: 2023-12-12 | End: 2023-12-12

## 2023-12-12 RX ORDER — ONDANSETRON 4 MG/1
8 TABLET, ORALLY DISINTEGRATING ORAL EVERY 8 HOURS PRN
Qty: 90 TABLET | Refills: 0 | Status: SHIPPED | OUTPATIENT
Start: 2023-12-12 | End: 2023-12-12

## 2023-12-12 RX ORDER — FENTANYL CITRATE 50 UG/ML
25 INJECTION, SOLUTION INTRAMUSCULAR; INTRAVENOUS
Status: COMPLETED | OUTPATIENT
Start: 2023-12-12 | End: 2023-12-12

## 2023-12-12 RX ORDER — ONDANSETRON 4 MG/1
8 TABLET, ORALLY DISINTEGRATING ORAL EVERY 8 HOURS PRN
Qty: 90 TABLET | Refills: 0 | Status: SHIPPED | OUTPATIENT
Start: 2023-12-12 | End: 2023-12-12 | Stop reason: SDUPTHER

## 2023-12-12 RX ORDER — OLANZAPINE 5 MG/1
5 TABLET ORAL NIGHTLY
Qty: 30 TABLET | Refills: 11 | Status: SHIPPED | OUTPATIENT
Start: 2023-12-12 | End: 2023-12-12

## 2023-12-12 RX ORDER — METRONIDAZOLE 500 MG/100ML
500 INJECTION, SOLUTION INTRAVENOUS
Status: COMPLETED | OUTPATIENT
Start: 2023-12-12 | End: 2023-12-12

## 2023-12-12 RX ORDER — OLANZAPINE 5 MG/1
5 TABLET ORAL NIGHTLY
Qty: 30 TABLET | Refills: 11 | Status: SHIPPED | OUTPATIENT
Start: 2023-12-12 | End: 2023-12-12 | Stop reason: SDUPTHER

## 2023-12-12 RX ORDER — NOREPINEPHRINE BITARTRATE/D5W 4MG/250ML
0-3 PLASTIC BAG, INJECTION (ML) INTRAVENOUS CONTINUOUS
Status: DISCONTINUED | OUTPATIENT
Start: 2023-12-12 | End: 2023-12-12

## 2023-12-12 RX ORDER — HEPARIN SODIUM 5000 [USP'U]/ML
5000 INJECTION, SOLUTION INTRAVENOUS; SUBCUTANEOUS EVERY 8 HOURS
Status: DISCONTINUED | OUTPATIENT
Start: 2023-12-13 | End: 2023-12-15

## 2023-12-12 RX ORDER — SODIUM CHLORIDE 0.9 % (FLUSH) 0.9 %
10 SYRINGE (ML) INJECTION
Status: DISCONTINUED | OUTPATIENT
Start: 2023-12-13 | End: 2023-12-19 | Stop reason: HOSPADM

## 2023-12-12 RX ORDER — LIDOCAINE AND PRILOCAINE 25; 25 MG/G; MG/G
CREAM TOPICAL ONCE
Qty: 30 G | Refills: 2 | Status: SHIPPED | OUTPATIENT
Start: 2023-12-12 | End: 2023-12-12

## 2023-12-12 RX ADMIN — SODIUM CHLORIDE, POTASSIUM CHLORIDE, SODIUM LACTATE AND CALCIUM CHLORIDE 3063 ML: 600; 310; 30; 20 INJECTION, SOLUTION INTRAVENOUS at 04:12

## 2023-12-12 RX ADMIN — SODIUM CHLORIDE, POTASSIUM CHLORIDE, SODIUM LACTATE AND CALCIUM CHLORIDE 1000 ML: 600; 310; 30; 20 INJECTION, SOLUTION INTRAVENOUS at 08:12

## 2023-12-12 RX ADMIN — NOREPINEPHRINE BITARTRATE 0.02 MCG/KG/MIN: 4 INJECTION, SOLUTION INTRAVENOUS at 11:12

## 2023-12-12 RX ADMIN — METRONIDAZOLE 500 MG: 5 INJECTION, SOLUTION INTRAVENOUS at 06:12

## 2023-12-12 RX ADMIN — MORPHINE SULFATE 6 MG: 2 INJECTION, SOLUTION INTRAMUSCULAR; INTRAVENOUS at 06:12

## 2023-12-12 RX ADMIN — LIDOCAINE 1 PATCH: 50 PATCH CUTANEOUS at 10:12

## 2023-12-12 RX ADMIN — FENTANYL CITRATE 25 MCG: 50 INJECTION INTRAMUSCULAR; INTRAVENOUS at 09:12

## 2023-12-12 RX ADMIN — IOHEXOL 100 ML: 350 INJECTION, SOLUTION INTRAVENOUS at 06:12

## 2023-12-12 RX ADMIN — CEFTRIAXONE 2 G: 2 INJECTION, POWDER, FOR SOLUTION INTRAMUSCULAR; INTRAVENOUS at 05:12

## 2023-12-12 NOTE — ED TRIAGE NOTES
Sent from [facility] , not on chemo at present, abnormal kidney function, need to check feeding tube

## 2023-12-13 VITALS — HEART RATE: 92 BPM

## 2023-12-13 PROBLEM — K94.23 PEG TUBE MALFUNCTION: Status: ACTIVE | Noted: 2023-12-13

## 2023-12-13 PROBLEM — Z51.5 PALLIATIVE CARE ENCOUNTER: Status: ACTIVE | Noted: 2023-12-13

## 2023-12-13 PROBLEM — N17.9 AKI (ACUTE KIDNEY INJURY): Status: ACTIVE | Noted: 2023-12-13

## 2023-12-13 PROBLEM — R18.8 ASCITES: Status: ACTIVE | Noted: 2023-12-13

## 2023-12-13 LAB
ALBUMIN FLD-MCNC: 1.7 G/DL
ALBUMIN SERPL BCP-MCNC: 2 G/DL (ref 3.5–5.2)
ALP SERPL-CCNC: 57 U/L (ref 55–135)
ALT SERPL W/O P-5'-P-CCNC: 11 U/L (ref 10–44)
ANION GAP SERPL CALC-SCNC: 11 MMOL/L (ref 8–16)
APPEARANCE FLD: ABNORMAL
AST SERPL-CCNC: 33 U/L (ref 10–40)
BASOPHILS # BLD AUTO: 0.02 K/UL (ref 0–0.2)
BASOPHILS NFR BLD: 0.1 % (ref 0–1.9)
BILIRUB SERPL-MCNC: 0.3 MG/DL (ref 0.1–1)
BODY FLD TYPE: ABNORMAL
BODY FLUID SOURCE, LDH: NORMAL
BUN SERPL-MCNC: 59 MG/DL (ref 6–20)
CALCIUM SERPL-MCNC: 8.3 MG/DL (ref 8.7–10.5)
CHLORIDE SERPL-SCNC: 95 MMOL/L (ref 95–110)
CO2 SERPL-SCNC: 23 MMOL/L (ref 23–29)
COLOR FLD: ABNORMAL
CREAT SERPL-MCNC: 1.5 MG/DL (ref 0.5–1.4)
DIFFERENTIAL METHOD: ABNORMAL
EOSINOPHIL # BLD AUTO: 0 K/UL (ref 0–0.5)
EOSINOPHIL NFR BLD: 0 % (ref 0–8)
ERYTHROCYTE [DISTWIDTH] IN BLOOD BY AUTOMATED COUNT: 14.6 % (ref 11.5–14.5)
EST. GFR  (NO RACE VARIABLE): 53 ML/MIN/1.73 M^2
GLUCOSE FLD-MCNC: 75 MG/DL
GLUCOSE SERPL-MCNC: 118 MG/DL (ref 70–110)
GRAM STN SPEC: NORMAL
GRAM STN SPEC: NORMAL
HCT VFR BLD AUTO: 29.8 % (ref 40–54)
HGB BLD-MCNC: 9.7 G/DL (ref 14–18)
IMM GRANULOCYTES # BLD AUTO: 0.25 K/UL (ref 0–0.04)
IMM GRANULOCYTES NFR BLD AUTO: 1.3 % (ref 0–0.5)
INR PPP: 1.1 (ref 0.8–1.2)
LDH FLD L TO P-CCNC: 2190 U/L
LYMPHOCYTES # BLD AUTO: 1.3 K/UL (ref 1–4.8)
LYMPHOCYTES NFR BLD: 6.7 % (ref 18–48)
LYMPHOCYTES NFR FLD MANUAL: 7 %
MAGNESIUM SERPL-MCNC: 2.5 MG/DL (ref 1.6–2.6)
MCH RBC QN AUTO: 27.3 PG (ref 27–31)
MCHC RBC AUTO-ENTMCNC: 32.6 G/DL (ref 32–36)
MCV RBC AUTO: 84 FL (ref 82–98)
MESOTHL CELL NFR FLD MANUAL: 13 %
MONOCYTES # BLD AUTO: 1 K/UL (ref 0.3–1)
MONOCYTES NFR BLD: 5.3 % (ref 4–15)
MONOS+MACROS NFR FLD MANUAL: 6 %
NEUTROPHILS # BLD AUTO: 16.2 K/UL (ref 1.8–7.7)
NEUTROPHILS NFR BLD: 86.6 % (ref 38–73)
NEUTROPHILS NFR FLD MANUAL: 14 %
NRBC BLD-RTO: 0 /100 WBC
OTHER CELLS FLD MANUAL: 60 %
PATH INTERP FLD-IMP: NORMAL
PHOSPHATE SERPL-MCNC: 4.7 MG/DL (ref 2.7–4.5)
PLATELET # BLD AUTO: 300 K/UL (ref 150–450)
PMV BLD AUTO: 10.9 FL (ref 9.2–12.9)
POTASSIUM SERPL-SCNC: 5.4 MMOL/L (ref 3.5–5.1)
PROT FLD-MCNC: 3.1 G/DL
PROT SERPL-MCNC: 5.4 G/DL (ref 6–8.4)
PROTHROMBIN TIME: 11.4 SEC (ref 9–12.5)
RBC # BLD AUTO: 3.55 M/UL (ref 4.6–6.2)
SODIUM SERPL-SCNC: 129 MMOL/L (ref 136–145)
SPECIMEN SOURCE: NORMAL
WBC # BLD AUTO: 18.75 K/UL (ref 3.9–12.7)
WBC # FLD: 1587 /CU MM

## 2023-12-13 PROCEDURE — 63600175 PHARM REV CODE 636 W HCPCS

## 2023-12-13 PROCEDURE — 25000003 PHARM REV CODE 250: Performed by: NURSE PRACTITIONER

## 2023-12-13 PROCEDURE — 88342 IMHCHEM/IMCYTCHM 1ST ANTB: CPT | Performed by: PATHOLOGY

## 2023-12-13 PROCEDURE — 88342 IMHCHEM/IMCYTCHM 1ST ANTB: CPT | Mod: 26,XU,, | Performed by: PATHOLOGY

## 2023-12-13 PROCEDURE — 88112 CYTOPATH CELL ENHANCE TECH: CPT | Performed by: PATHOLOGY

## 2023-12-13 PROCEDURE — 88341 PR IHC OR ICC EACH ADD'L SINGLE ANTIBODY  STAINPR: ICD-10-PCS | Mod: 26,59,, | Performed by: PATHOLOGY

## 2023-12-13 PROCEDURE — 83735 ASSAY OF MAGNESIUM: CPT | Performed by: NURSE PRACTITIONER

## 2023-12-13 PROCEDURE — 94640 AIRWAY INHALATION TREATMENT: CPT

## 2023-12-13 PROCEDURE — 82945 GLUCOSE OTHER FLUID: CPT

## 2023-12-13 PROCEDURE — 88360 TUMOR IMMUNOHISTOCHEM/MANUAL: CPT | Mod: 26,,, | Performed by: PATHOLOGY

## 2023-12-13 PROCEDURE — 84157 ASSAY OF PROTEIN OTHER: CPT

## 2023-12-13 PROCEDURE — 88112 PR  CYTOPATH, CELL ENHANCE TECH: ICD-10-PCS | Mod: 26,,, | Performed by: PATHOLOGY

## 2023-12-13 PROCEDURE — 25000003 PHARM REV CODE 250

## 2023-12-13 PROCEDURE — 80053 COMPREHEN METABOLIC PANEL: CPT | Performed by: NURSE PRACTITIONER

## 2023-12-13 PROCEDURE — 87075 CULTR BACTERIA EXCEPT BLOOD: CPT

## 2023-12-13 PROCEDURE — 88305 TISSUE EXAM BY PATHOLOGIST: ICD-10-PCS | Mod: 26,,, | Performed by: PATHOLOGY

## 2023-12-13 PROCEDURE — C9113 INJ PANTOPRAZOLE SODIUM, VIA: HCPCS

## 2023-12-13 PROCEDURE — 99497 PR ADVNCD CARE PLAN 30 MIN: ICD-10-PCS | Mod: 25,,, | Performed by: STUDENT IN AN ORGANIZED HEALTH CARE EDUCATION/TRAINING PROGRAM

## 2023-12-13 PROCEDURE — 63600175 PHARM REV CODE 636 W HCPCS: Performed by: NURSE PRACTITIONER

## 2023-12-13 PROCEDURE — 99291 PR CRITICAL CARE, E/M 30-74 MINUTES: ICD-10-PCS | Mod: FS,,, | Performed by: NURSE PRACTITIONER

## 2023-12-13 PROCEDURE — 99292 PR CRITICAL CARE, ADDL 30 MIN: ICD-10-PCS | Mod: FS,,, | Performed by: NURSE PRACTITIONER

## 2023-12-13 PROCEDURE — 99223 PR INITIAL HOSPITAL CARE,LEVL III: ICD-10-PCS | Mod: ,,, | Performed by: STUDENT IN AN ORGANIZED HEALTH CARE EDUCATION/TRAINING PROGRAM

## 2023-12-13 PROCEDURE — 87102 FUNGUS ISOLATION CULTURE: CPT

## 2023-12-13 PROCEDURE — 84100 ASSAY OF PHOSPHORUS: CPT | Performed by: NURSE PRACTITIONER

## 2023-12-13 PROCEDURE — 87070 CULTURE OTHR SPECIMN AEROBIC: CPT

## 2023-12-13 PROCEDURE — 99285 EMERGENCY DEPT VISIT HI MDM: CPT | Mod: 25

## 2023-12-13 PROCEDURE — 99900035 HC TECH TIME PER 15 MIN (STAT)

## 2023-12-13 PROCEDURE — 85025 COMPLETE CBC W/AUTO DIFF WBC: CPT | Performed by: NURSE PRACTITIONER

## 2023-12-13 PROCEDURE — 99497 ADVNCD CARE PLAN 30 MIN: CPT | Mod: 25,,, | Performed by: STUDENT IN AN ORGANIZED HEALTH CARE EDUCATION/TRAINING PROGRAM

## 2023-12-13 PROCEDURE — 88112 CYTOPATH CELL ENHANCE TECH: CPT | Mod: 26,,, | Performed by: PATHOLOGY

## 2023-12-13 PROCEDURE — 88360 TUMOR IMMUNOHISTOCHEM/MANUAL: CPT | Performed by: PATHOLOGY

## 2023-12-13 PROCEDURE — 25000242 PHARM REV CODE 250 ALT 637 W/ HCPCS

## 2023-12-13 PROCEDURE — 88305 TISSUE EXAM BY PATHOLOGIST: CPT | Mod: 26,,, | Performed by: PATHOLOGY

## 2023-12-13 PROCEDURE — 88342 CHG IMMUNOCYTOCHEMISTRY: ICD-10-PCS | Mod: 26,XU,, | Performed by: PATHOLOGY

## 2023-12-13 PROCEDURE — 89051 BODY FLUID CELL COUNT: CPT

## 2023-12-13 PROCEDURE — 87205 SMEAR GRAM STAIN: CPT

## 2023-12-13 PROCEDURE — 85610 PROTHROMBIN TIME: CPT | Performed by: NURSE PRACTITIONER

## 2023-12-13 PROCEDURE — 99291 CRITICAL CARE FIRST HOUR: CPT | Mod: FS,,, | Performed by: NURSE PRACTITIONER

## 2023-12-13 PROCEDURE — 82042 OTHER SOURCE ALBUMIN QUAN EA: CPT

## 2023-12-13 PROCEDURE — 20000000 HC ICU ROOM

## 2023-12-13 PROCEDURE — 49082 ABD PARACENTESIS: CPT

## 2023-12-13 PROCEDURE — 83615 LACTATE (LD) (LDH) ENZYME: CPT

## 2023-12-13 PROCEDURE — 88360 PR  TUMOR IMMUNOHISTOCHEM/MANUAL: ICD-10-PCS | Mod: 26,,, | Performed by: PATHOLOGY

## 2023-12-13 PROCEDURE — 88341 IMHCHEM/IMCYTCHM EA ADD ANTB: CPT | Mod: 26,59,, | Performed by: PATHOLOGY

## 2023-12-13 PROCEDURE — 94761 N-INVAS EAR/PLS OXIMETRY MLT: CPT

## 2023-12-13 PROCEDURE — 99223 1ST HOSP IP/OBS HIGH 75: CPT | Mod: ,,, | Performed by: STUDENT IN AN ORGANIZED HEALTH CARE EDUCATION/TRAINING PROGRAM

## 2023-12-13 PROCEDURE — 88341 IMHCHEM/IMCYTCHM EA ADD ANTB: CPT | Performed by: PATHOLOGY

## 2023-12-13 PROCEDURE — 88305 TISSUE EXAM BY PATHOLOGIST: CPT | Performed by: PATHOLOGY

## 2023-12-13 PROCEDURE — 99292 CRITICAL CARE ADDL 30 MIN: CPT | Mod: FS,,, | Performed by: NURSE PRACTITIONER

## 2023-12-13 RX ORDER — HYDROMORPHONE HYDROCHLORIDE 1 MG/ML
0.2 INJECTION, SOLUTION INTRAMUSCULAR; INTRAVENOUS; SUBCUTANEOUS
Status: DISCONTINUED | OUTPATIENT
Start: 2023-12-13 | End: 2023-12-13

## 2023-12-13 RX ORDER — OLANZAPINE 10 MG/2ML
5 INJECTION, POWDER, FOR SOLUTION INTRAMUSCULAR ONCE AS NEEDED
Status: DISCONTINUED | OUTPATIENT
Start: 2023-12-13 | End: 2023-12-19 | Stop reason: HOSPADM

## 2023-12-13 RX ORDER — PANTOPRAZOLE SODIUM 40 MG/10ML
40 INJECTION, POWDER, LYOPHILIZED, FOR SOLUTION INTRAVENOUS DAILY
Status: DISCONTINUED | OUTPATIENT
Start: 2023-12-13 | End: 2023-12-15

## 2023-12-13 RX ORDER — METRONIDAZOLE 500 MG/100ML
500 INJECTION, SOLUTION INTRAVENOUS
Status: DISCONTINUED | OUTPATIENT
Start: 2023-12-13 | End: 2023-12-14

## 2023-12-13 RX ORDER — OLANZAPINE 5 MG/1
5 TABLET ORAL DAILY
Status: DISCONTINUED | OUTPATIENT
Start: 2023-12-13 | End: 2023-12-13

## 2023-12-13 RX ORDER — MORPHINE SULFATE 2 MG/ML
2 INJECTION, SOLUTION INTRAMUSCULAR; INTRAVENOUS EVERY 12 HOURS PRN
Status: DISCONTINUED | OUTPATIENT
Start: 2023-12-13 | End: 2023-12-13

## 2023-12-13 RX ORDER — HYDROMORPHONE HYDROCHLORIDE 1 MG/ML
0.5 INJECTION, SOLUTION INTRAMUSCULAR; INTRAVENOUS; SUBCUTANEOUS EVERY 4 HOURS PRN
Status: DISCONTINUED | OUTPATIENT
Start: 2023-12-13 | End: 2023-12-14

## 2023-12-13 RX ORDER — HYDROMORPHONE HYDROCHLORIDE 1 MG/ML
0.2 INJECTION, SOLUTION INTRAMUSCULAR; INTRAVENOUS; SUBCUTANEOUS ONCE
Status: COMPLETED | OUTPATIENT
Start: 2023-12-13 | End: 2023-12-13

## 2023-12-13 RX ORDER — SODIUM CHLORIDE, SODIUM LACTATE, POTASSIUM CHLORIDE, CALCIUM CHLORIDE 600; 310; 30; 20 MG/100ML; MG/100ML; MG/100ML; MG/100ML
INJECTION, SOLUTION INTRAVENOUS CONTINUOUS
Status: DISCONTINUED | OUTPATIENT
Start: 2023-12-13 | End: 2023-12-14

## 2023-12-13 RX ORDER — ONDANSETRON 2 MG/ML
4 INJECTION INTRAMUSCULAR; INTRAVENOUS EVERY 6 HOURS PRN
Status: DISCONTINUED | OUTPATIENT
Start: 2023-12-13 | End: 2023-12-19 | Stop reason: HOSPADM

## 2023-12-13 RX ORDER — HYDROMORPHONE HYDROCHLORIDE 1 MG/ML
0.2 INJECTION, SOLUTION INTRAMUSCULAR; INTRAVENOUS; SUBCUTANEOUS EVERY 6 HOURS PRN
Status: DISCONTINUED | OUTPATIENT
Start: 2023-12-13 | End: 2023-12-13

## 2023-12-13 RX ORDER — ALBUTEROL SULFATE 2.5 MG/.5ML
2.5 SOLUTION RESPIRATORY (INHALATION) ONCE
Status: COMPLETED | OUTPATIENT
Start: 2023-12-13 | End: 2023-12-13

## 2023-12-13 RX ORDER — LIDOCAINE HYDROCHLORIDE 10 MG/ML
5 INJECTION INFILTRATION; PERINEURAL ONCE
Status: COMPLETED | OUTPATIENT
Start: 2023-12-13 | End: 2023-12-13

## 2023-12-13 RX ADMIN — HYDROMORPHONE HYDROCHLORIDE 0.2 MG: 0.5 INJECTION, SOLUTION INTRAMUSCULAR; INTRAVENOUS; SUBCUTANEOUS at 03:12

## 2023-12-13 RX ADMIN — MORPHINE SULFATE 2 MG: 2 INJECTION, SOLUTION INTRAMUSCULAR; INTRAVENOUS at 06:12

## 2023-12-13 RX ADMIN — NOREPINEPHRINE BITARTRATE 0.04 MCG/KG/MIN: 4 INJECTION, SOLUTION INTRAVENOUS at 12:12

## 2023-12-13 RX ADMIN — HEPARIN SODIUM 5000 UNITS: 5000 INJECTION INTRAVENOUS; SUBCUTANEOUS at 06:12

## 2023-12-13 RX ADMIN — HYDROMORPHONE HYDROCHLORIDE 0.2 MG: 0.5 INJECTION, SOLUTION INTRAMUSCULAR; INTRAVENOUS; SUBCUTANEOUS at 09:12

## 2023-12-13 RX ADMIN — HEPARIN SODIUM 5000 UNITS: 5000 INJECTION INTRAVENOUS; SUBCUTANEOUS at 10:12

## 2023-12-13 RX ADMIN — ALBUTEROL SULFATE 2.5 MG: 2.5 SOLUTION RESPIRATORY (INHALATION) at 04:12

## 2023-12-13 RX ADMIN — LIDOCAINE HYDROCHLORIDE 5 ML: 10 INJECTION, SOLUTION INFILTRATION; PERINEURAL at 01:12

## 2023-12-13 RX ADMIN — CHLORPROMAZINE HYDROCHLORIDE 25 MG: 25 INJECTION INTRAMUSCULAR at 02:12

## 2023-12-13 RX ADMIN — SODIUM CHLORIDE, POTASSIUM CHLORIDE, SODIUM LACTATE AND CALCIUM CHLORIDE: 600; 310; 30; 20 INJECTION, SOLUTION INTRAVENOUS at 09:12

## 2023-12-13 RX ADMIN — METRONIDAZOLE 500 MG: 500 INJECTION, SOLUTION INTRAVENOUS at 09:12

## 2023-12-13 RX ADMIN — CEFTRIAXONE 2 G: 2 INJECTION, POWDER, FOR SOLUTION INTRAMUSCULAR; INTRAVENOUS at 04:12

## 2023-12-13 RX ADMIN — HEPARIN SODIUM 5000 UNITS: 5000 INJECTION INTRAVENOUS; SUBCUTANEOUS at 02:12

## 2023-12-13 RX ADMIN — HYDROMORPHONE HYDROCHLORIDE 0.2 MG: 0.5 INJECTION, SOLUTION INTRAMUSCULAR; INTRAVENOUS; SUBCUTANEOUS at 12:12

## 2023-12-13 RX ADMIN — HYDROMORPHONE HYDROCHLORIDE 0.5 MG: 0.5 INJECTION, SOLUTION INTRAMUSCULAR; INTRAVENOUS; SUBCUTANEOUS at 09:12

## 2023-12-13 RX ADMIN — METRONIDAZOLE 500 MG: 500 INJECTION, SOLUTION INTRAVENOUS at 05:12

## 2023-12-13 RX ADMIN — HYDROMORPHONE HYDROCHLORIDE 0.2 MG: 0.5 INJECTION, SOLUTION INTRAMUSCULAR; INTRAVENOUS; SUBCUTANEOUS at 05:12

## 2023-12-13 RX ADMIN — PANTOPRAZOLE SODIUM 40 MG: 40 INJECTION, POWDER, FOR SOLUTION INTRAVENOUS at 08:12

## 2023-12-13 NOTE — PROCEDURES
"David Benjamin Jr. is a 58 y.o. male patient.    Temp: 97.6 °F (36.4 °C) (12/13/23 0300)  Pulse: 109 (12/13/23 0515)  Resp: (!) 24 (12/13/23 0638)  BP: 110/67 (12/13/23 0515)  SpO2: 100 % (12/13/23 0515)  Weight: 108 kg (238 lb 1.6 oz) (12/13/23 0300)  Height: 6' 1" (185.4 cm) (12/13/23 0300)       Paracentesis    Date/Time: 12/13/2023 3:15 AM  Location procedure was performed: [facility]    Performed by: Hemal Ku MD  Authorized by: Medina Simms MD  Assisting provider: Taqueria Vallejo MD  Consent Done: Yes  Consent: Written consent obtained.  Consent given by: patient  Patient understanding: patient states understanding of the procedure being performed  Patient identity confirmed: MRN, name and verbally with patient  Time out: Immediately prior to procedure a "time out" was called to verify the correct patient, procedure, equipment, support staff and site/side marked as required.  Initial or subsequent exam: subsequent  Procedure purpose: diagnostic  Indications: secondary bacterial peritonitis and suspected peritonitis  Indication comments: SBP vs. malignant  Anesthesia: local infiltration    Anesthesia:  Local Anesthetic: lidocaine 1% without epinephrine  Anesthetic total: 5 mL  Preparation: Patient was prepped and draped in the usual sterile fashion.  Needle gauge: 20  Puncture site: right lower quadrant  Fluid removed: 50(ml)  Fluid appearance: bloody  Patient tolerance: Patient tolerated the procedure well with no immediate complications          12/13/2023    "

## 2023-12-13 NOTE — ASSESSMENT & PLAN NOTE
This patient does have evidence of infective focus  My overall impression is septic shock due to MAP < 65 and SBP < 90.  Source: Abdominal    Antibiotics given- CTX and Flagyl given in ED. Will broaden coverage to Vanc/Zosyn and de-escalate as appropriate.   Antibiotics (72h ago, onward)      Start     Stop Route Frequency Ordered    12/13/23 0600  piperacillin-tazobactam (ZOSYN) 4.5 g in dextrose 5 % in water (D5W) 100 mL IVPB (MB+)         -- IV Every 8 hours (non-standard times) 12/12/23 2342    12/13/23 0041  vancomycin - pharmacy to dose  (vancomycin IVPB (PEDS and ADULTS))        [facility] for full [facility] Orders Report.    -- IV pharmacy to manage frequency 12/12/23 2342          Latest lactate reviewed-  Recent Labs   Lab 12/12/23  1639   POCLAC 1.79     Organ dysfunction indicated by Acute kidney injury    Fluid challenge Actual Body weight- Patient will receive 30ml/kg actual body weight to calculate fluid bolus for treatment of septic shock.     Will Start Pressors- Levophed for MAP of 65  Source control achieved by: Abx.     -- Continue BSA   -- Follow up blood cultures   -- Diagnostic para at bedside. Follow up cultures  -- Vasopressors for MAP > 65

## 2023-12-13 NOTE — ASSESSMENT & PLAN NOTE
58 y.o. male with PMHx of gastric high-grade neuroendocrine carcinoma who was admitted to the ICU overnight for septic shock. General surgery was consulted for evaluation of PEG.     - CT demonstrates interval growth of gastric mass. PEG appears in appropriate position but gastric lumen is compressed. Suspect issues with PEG aspiration 2/2 position against posterior wall of the stomach. Would not use PEG for tube feeds. Should remain in place though until at least 6 weeks post op from placement on 11/29/23.   - Continue dressing the PEG site, the tract was likely dilated during the removal/exchange process since it was placed creating a larger wound with more drainage expected  - Remainder of care per MICU  - Please call with questions

## 2023-12-13 NOTE — HPI
David Benjamin Jr. is a 58 y.o. male with PMHx of gastric high-grade neuroendocrine carcinoma who was admitted to the ICU overnight for septic shock. He was recently diagnosed with gastric neuroendocrine carcinoma last month after presenting with dysphagia and subsequent EGD demonstrating this mass. Initial discussions with our surgical oncology team here were to hold of on a feeding tube, however, when patient was discharged he changed his mind and had PEG placement at outside hospital. This was done on 11/29/23. Shortly afterward he presented to outside ED with drainage around the tube site, and the PEG was tightened. We don't have documentation of this, but per the patient and family, it sounds like he then had a buried bumper so his general surgeon replaced the PEG at bedside. He otherwise had tolerated tube feeds, but got full quickly. Currently the PEG will flush but not aspirate.  He had a clinic appointment with medical oncology yesterday and was sent to the ED for further evaluation.   He is currently on Levo 0.04, sating well on room air. Labs demonstrate elevated WBC at 18.6, Cr 1.5, mild electrolyte abnormalities. CT A/P demonstrates a large mass of the distal esophagus, stomach, and mesentery with metastatic disease; PEG is noted to be in appropriate position.

## 2023-12-13 NOTE — PLAN OF CARE
MICU DAILY GOALS     Family/Goals of care/Code Status   Code Status: DNR    24H Vital Sign Range  Temp:  [97.5 °F (36.4 °C)-98 °F (36.7 °C)]   Pulse:  []   Resp:  [15-28]   BP: ()/(48-67)   SpO2:  [94 %-100 %]      Shift Events (include procedures and significant events)   No acute events throughout shift.    AWAKE RASS: Goal - RASS Goal: 0-->alert and calm  Actual - RASS (Estrada Agitation-Sedation Scale): alert and calm    Restraint necessity: Not necessary   BREATHE SBT: Not intubated    Coordinate A & B, analgesics/sedatives Pain: managed   SAT: Not intubated   Delirium CAM-ICU: Overall CAM-ICU: Negative   Early(intubated/ Progressive (non-intubated) Mobility MOVE Screen (INTUBATED ONLY): Not intubated    Activity: Activity Management: Rolling - L1   Feeding/Nutrition Diet order: Diet/Nutrition Received: full liquid,     Thrombus DVT prophylaxis:     HOB Elevation Head of Bed (HOB) Positioning: HOB at 45 degrees   Ulcer Prophylaxis    Glucose control      Skin Skin assessed during: Daily Assessment    Surgical wound? Yes    [x] Altered Skin Integrity Present or Discovered   [x] LDA present in EPIC            Bowel Function no issues    Indwelling Catheter Necessity         No   De-escalation Antibiotics No       VS and assessment per flow sheet, patient progressing towards goals as tolerated, plan of care reviewed with patient and wife, all concerns addressed, will continue to monitor.

## 2023-12-13 NOTE — ASSESSMENT & PLAN NOTE
Abdominal pain + distention with ascites noted on CT imaging. Likely malignant in setting of Stage IV gastric neuroendocrine cancer diagnosis.      -- Possible component of SBP  -- Started on CTX in ED  -- will evaluate with POCUS for safe pocket for diagnostic paracentesis  -- continue treatment with broad spectrum Abx in setting of likely septic shock

## 2023-12-13 NOTE — ASSESSMENT & PLAN NOTE
"Follows in [facility] clinic with Dr. Lopez. Plan for chemo initiation this month as below.   12/18/2023:       Chemotherapy       CARBOplatin (PARAPLATIN) in sodium chloride 0.9% 250 mL chemo infusion       etoposide (VEPESID) 100 mg/m2 = 230 mg in sodium chloride 0.9% 511.5 mL chemo infusion    Per most recent note: "Long discussion with patient and wife re the diagnosis, natural history and prognosis of stage IV gastric NEC. Cancer is not curable but treatable. The goal of treatment is palliative, with hopes of containing the cancer, slowing down growth, and prolonging life."  "

## 2023-12-13 NOTE — H&P
[facility]  Critical Care Medicine  History & Physical    Patient Name: David Benjamin Jr.  MRN: [id number]  Admission Date: 12/12/2023  Hospital Length of Stay: 1 days  Code Status: Full Code  Attending Physician: Holger Brasher*   Primary Care Provider: Codi Chavez FNP   Principal Problem: Septic shock    Subjective:     HPI:  Mr. Benjamin is a 57yo M with Pmhx of recently diagnosed neuroendocrine stomach mass, with recent PEG placement at OSH, presenting with complaint of difficulty using his PEG. Also complaining of abdominal pain, distention, and drainage at the PEG site. These problems have been progressively worsening over the last week since the PEG was placed. In the ED, he was found to be hypotensive, tachycardic, tachypneic, with an elevated WBC count and PERLA. Given sepsis fluid bolus, CTX, Flagyl and started on Levophed for persistent hypotension. CT Abd/pelvis performed without evidence of abscess or malpositioned PEG tube. Mass and metastatic lesions present as well as moderate ascites. Admitted to MICU with concerns for septic shock requiring vasopressors.     Hospital/ICU Course:  No notes on file     History reviewed. No pertinent past medical history.    Past Surgical History:   Procedure Laterality Date    ESOPHAGOGASTRODUODENOSCOPY N/A 11/21/2023    Procedure: EGD (ESOPHAGOGASTRODUODENOSCOPY);  Surgeon: Princess Voss MD;  Location: [address];  Service: Gastroenterology;  Laterality: N/A;       Review of patient's allergies indicates:   Allergen Reactions    Nsaids (non-steroidal anti-inflammatory drug) Hives     With ASA and Ibuprofen    Penicillins Hives and Itching       Family History    None       Tobacco Use    Smoking status: Never     Passive exposure: Never    Smokeless tobacco: Never   Substance and Sexual Activity    Alcohol use: Not Currently    Drug use: Never    Sexual activity: Not Currently      Review of Systems    Constitutional:  Positive for activity change, appetite change and fatigue.   Respiratory:  Negative for chest tightness and shortness of breath.    Cardiovascular:  Negative for chest pain and leg swelling.   Gastrointestinal:  Positive for abdominal distention, abdominal pain and nausea. Negative for vomiting.   Genitourinary:  Positive for decreased urine volume.   Musculoskeletal:  Negative for back pain and neck pain.   Skin:  Negative for color change.   Neurological:  Positive for weakness. Negative for syncope and headaches.   Psychiatric/Behavioral:  Negative for agitation and confusion.      Objective:     Vital Signs (Most Recent):  Temp: 98 °F (36.7 °C) (12/12/23 2330)  Pulse: 86 (12/12/23 2330)  Resp: (!) 24 (12/12/23 2330)  BP: (!) 85/50 (12/12/23 2330)  SpO2: 97 % (12/12/23 2330) Vital Signs (24h Range):  Temp:  [97 °F (36.1 °C)-98.1 °F (36.7 °C)] 98 °F (36.7 °C)  Pulse:  [] 86  Resp:  [18-28] 24  SpO2:  [94 %-100 %] 97 %  BP: ()/(48-60) 85/50   Weight: 102.1 kg (225 lb)  Body mass index is 29.69 kg/m².      Intake/Output Summary (Last 24 hours) at 12/12/2023 2344  Last data filed at 12/12/2023 2331  Gross per 24 hour   Intake 1102.91 ml   Output --   Net 1102.91 ml          Physical Exam  Vitals and nursing note reviewed.   Constitutional:       Appearance: He is obese. He is ill-appearing.   HENT:      Head: Normocephalic and atraumatic.      Mouth/Throat:      Pharynx: Oropharynx is clear. No oropharyngeal exudate.   Eyes:      General: No scleral icterus.     Pupils: Pupils are equal, round, and reactive to light.   Cardiovascular:      Rate and Rhythm: Normal rate.      Pulses: Normal pulses.      Heart sounds: No murmur heard.  Abdominal:      General: Bowel sounds are decreased. There is distension.      Tenderness: There is abdominal tenderness.      Comments: PEG tube with purulent drainage around site   Musculoskeletal:      Right lower leg: No edema.      Left lower leg: No  edema.   Skin:     General: Skin is dry.      Capillary Refill: Capillary refill takes less than 2 seconds.      Coloration: Skin is pale. Skin is not jaundiced.   Neurological:      General: No focal deficit present.      Mental Status: He is oriented to person, place, and time. Mental status is at baseline.      GCS: GCS eye subscore is 4. GCS verbal subscore is 5. GCS motor subscore is 6.   Psychiatric:         Mood and Affect: Mood normal.            Vents:     Lines/Drains/Airways       Drain  Duration                  Gastrostomy/Enterostomy 12/12/23 2053 LUQ <1 day              Peripheral Intravenous Line  Duration                  Peripheral IV - Single Lumen 12/12/23 1638 22 G Anterior;Left Hand <1 day         Peripheral IV - Single Lumen 12/12/23 1724 16 G;1 3/4 in Right Antecubital <1 day                  Significant Labs:    CBC/Anemia Profile:  Recent Labs   Lab 12/12/23  1403   WBC 19.58*   HGB 11.3*   HCT 33.4*      MCV 84   RDW 14.3        Chemistries:  Recent Labs   Lab 12/12/23  1403   *   K 5.5*   CL 94*   CO2 20*   BUN 63*   CREATININE 1.7*   CALCIUM 8.7   ALBUMIN 2.2*   PROT 5.9*   BILITOT 0.4   ALKPHOS 64   ALT 12   AST 31       All pertinent labs within the past 24 hours have been reviewed.    Significant Imaging: I have reviewed all pertinent imaging results/findings within the past 24 hours.  Assessment/Plan:     Renal/  PERLA (acute kidney injury)  Patient with PERLA on admission, likely pre-renal in etiology in setting of decreased intake with PEG tube  dysfunction.      Cr at admission: 1.7  Cr at baseline: 0.7  Estimated Creatinine Clearance: 58 mL/min (A) (based on SCr of 1.7 mg/dL (H)).     -- s/p sepsis fluid bolus in ED  -- Trending CMP  -- Renally dose medications  -- avoid nephrotoxic agents   -- Strict I&O   -- MAP > 65. Vasopressors   -- Hgb > 7    ID  * Septic shock  This patient does have evidence of infective focus  My overall impression is septic shock due to MAP <  "65 and SBP < 90.  Source: Abdominal    Antibiotics given- CTX and Flagyl given in ED. Will broaden coverage to Vanc/Zosyn and de-escalate as appropriate.   Antibiotics (72h ago, onward)      Start     Stop Route Frequency Ordered    12/13/23 0600  piperacillin-tazobactam (ZOSYN) 4.5 g in dextrose 5 % in water (D5W) 100 mL IVPB (MB+)         -- IV Every 8 hours (non-standard times) 12/12/23 2342    12/13/23 0041  vancomycin - pharmacy to dose  (vancomycin IVPB (PEDS and ADULTS))        See Hyperspace for full Linked Orders Report.    -- IV pharmacy to manage frequency 12/12/23 2342          Latest lactate reviewed-  Recent Labs   Lab 12/12/23  1639   POCLAC 1.79     Organ dysfunction indicated by Acute kidney injury    Fluid challenge Actual Body weight- Patient will receive 30ml/kg actual body weight to calculate fluid bolus for treatment of septic shock.     Will Start Pressors- Levophed for MAP of 65  Source control achieved by: Abx.     -- Continue BSA   -- Follow up blood cultures   -- Diagnostic para at bedside. Follow up cultures  -- Vasopressors for MAP > 65      Oncology  Primary neuroendocrine carcinoma of stomach  [place] [facility] [place]. Plan for chemo initiation this month as below.   12/18/2023:       Chemotherapy       CARBOplatin (PARAPLATIN) in sodium chloride 0.9% 250 mL chemo infusion       etoposide (VEPESID) 100 mg/m2 = 230 mg in sodium chloride 0.9% 511.5 mL chemo infusion    Per most recent note: "Long discussion with patient and wife re the diagnosis, natural history and prognosis of stage IV gastric NEC. Cancer is not curable but treatable. The goal of treatment is palliative, with hopes of containing the cancer, slowing down growth, and prolonging life."    GI  Ascites  Abdominal pain + distention with ascites noted on CT imaging. Likely malignant in setting of Stage IV gastric neuroendocrine cancer diagnosis.      -- Possible component of SBP  -- Started on CTX in ED  -- will " "evaluate with POCUS for safe pocket for diagnostic paracentesis  -- continue treatment with broad spectrum Abx in setting of likely septic shock     PEG tube malfunction  Patient with PEG tube placed 1 week ago at OSH for dysphagia in setting of stomach/esophageal mass.      -- Reports tube will not flush with leakage at site for several days and he has been taking water po as tube not functioning.   -- Consult GI vs Gen Surg for PEG evaluation with possible exchange  -- NPO pending evaluation  -- Abx to treat potential infection at PEG site.     Mass of gastroesophageal junction  Recently discovered mass of stomach/distal esophagus. EGD biopsy diagnosed high grade neuroendocrine tumor.     -- S/p PEG placement approximately 1 week ago at OSH for persistent dysphagia  -- Had PET scan today, demonstrating "Extensive lower thoracic and abdominal lymphadenopathy with smaller lymph nodes of the chest.  There is also extensive peritoneal omental metastases and there are bone metastases."   -- Per record review, saw Dr. Lopez with hematology/oncology today. Plan to start palliative chemotherapy after he recovers from acute illness.         Critical Care Daily Checklist:    A: Awake: RASS Goal/Actual Goal:    Actual:     B: Spontaneous Breathing Trial Performed?     C: SAT & SBT Coordinated?  N/A                      D: Delirium: CAM-ICU     E: Early Mobility Performed? No   F: Feeding Goal:    Status:     Current Diet Order   Procedures    Diet renal      AS: Analgesia/Sedation PRN    T: Thromboembolic Prophylaxis Heparin    H: HOB > 300 Yes   U: Stress Ulcer Prophylaxis (if needed) N/A   G: Glucose Control < 180 goal    B: Bowel Function     I: Indwelling Catheter (Lines & Marcos) Necessity PIV   D: De-escalation of Antimicrobials/Pharmacotherapies Continue BSA    Plan for the day/ETD Admit to ICU for management of septic shock     Code Status:  Family/Goals of Care: Full Code  Updated at bedside      Critical Care Time: 60 " minutes    Plan of care discussed with Dr. Simms. To be discussed with Dr. Brasher and attestation to follow.     Critical secondary to Patient has a condition that poses threat to life and bodily function: septic shock     Critical care was time spent personally by me on the following activities: development of treatment plan with patient or surrogate and bedside caregivers, discussions with consultants, evaluation of patient's response to treatment, examination of patient, ordering and performing treatments and interventions, ordering and review of laboratory studies, ordering and review of radiographic studies, pulse oximetry, re-evaluation of patient's condition. This critical care time did not overlap with that of any other provider or involve time for any procedures.     Travis Ferreira NP  Critical Care Medicine  [facility] - Cardiac Medical ICU

## 2023-12-13 NOTE — HPI
Mr. Benjamin is a 57yo M with Pmhx of recently diagnosed neuroendocrine stomach mass, with recent PEG placement at OSH, presenting with complaint of difficulty using his PEG. Also complaining of abdominal pain, distention, and drainage at the PEG site. These problems have been progressively worsening over the last week since the PEG was placed. In the ED, he was found to be hypotensive, tachycardic, tachypneic, with an elevated WBC count and PERLA. Given sepsis fluid bolus, CTX, Flagyl and started on Levophed for persistent hypotension. CT Abd/pelvis performed without evidence of abscess or malpositioned PEG tube. Mass and metastatic lesions present as well as moderate ascites. Admitted to MICU with concerns for septic shock requiring vasopressors.

## 2023-12-13 NOTE — PLAN OF CARE
MICU DAILY GOALS     Family/Goals of care/Code Status   Code Status: Full Code    24H Vital Sign Range  Temp:  [97 °F (36.1 °C)-98.1 °F (36.7 °C)]   Pulse:  []   Resp:  [17-28]   BP: ()/(48-67)   SpO2:  [94 %-100 %]      Shift Events (include procedures and significant events)  Admitted to MICU from ED due to vasopressor requirement. On low dose Levophed. No acute events.   Bedside abd ultrasound and diagnostic paracentesis performed by CC team; samples collected and sent to lab.  Albuterol treatment x1 received for potassium of 5.4.    AWAKE RASS: Goal - RASS Goal: 0-->alert and calm  Actual - RASS (Estrada Agitation-Sedation Scale): alert and calm    Restraint necessity: Not necessary   BREATHE SBT: Not intubated    Coordinate A & B, analgesics/sedatives Pain: managed   SAT: Not intubated   Delirium CAM-ICU: Overall CAM-ICU: Negative   Early(intubated/ Progressive (non-intubated) Mobility MOVE Screen (INTUBATED ONLY): Not intubated    Activity: Activity Management: Rolling - L1, Arm raise - L1, Heel slide - L1, Standing - L3, Sitting at edge of bed - L2   Feeding/Nutrition Diet order:  ,     Thrombus DVT prophylaxis:     HOB Elevation Head of Bed (HOB) Positioning: HOB at 20-30 degrees   Ulcer Prophylaxis GI: yes   Glucose control managed     Skin Skin assessed during: Admit    Sacrum intact/not altered? Yes  Heels intact/not altered? Yes  Surgical wound? No    Check one (no altered skin or altered skin) and sub boxes:  [x] No Altered Skin Integrity Present    [x]Prevention Measures Documented    [] Altered Skin Integrity Present or Discovered   [] LDA present in EPIC              [] LDA added in EPIC   [] Wound Image Taken (required on admit,                   transfer/discharge and every Tuesday)    Wound Care Consulted? No    Attending Nurse: Madhu Simpson RN    Second RN/Staff Member: Ema Garcia RN   Bowel Function no issues    Indwelling Catheter Necessity         De-escalation Antibiotics No        VS and assessment per flow sheet, patient progressing towards goals as tolerated, plan of care reviewed with [unfilled] and family - pt and pt spouse, all concerns addressed at this time.

## 2023-12-13 NOTE — ED PROVIDER NOTES
Encounter Date: 12/12/2023       History     Chief Complaint   Patient presents with    Multiple complaints     Sent from [facility] , not on chemo at present, abnormal kidney function, need to check feeding tube     David Benjamin is a 58 M recent dx of primary gastric neuroendocrine w/ metastasis sent from Oncology Clinic for abdominal distention, pain, PEG tube malfunction.  Wife at bedside provides history, states his PEG tube was placed approximately 1 week ago at outside hospital.  He has been to the ED twice since placement.  He has had increased drainage around the PEG site and states it does not flush.  He has not been able to get his tube feeds and has been taking fluids by mouth.  He is reported significant swelling to his abdomen, decreased bowel movements and worsening pain.  He had a follow-up with Dr. Lopez to establish care, he was sent to ED for emergent evaluation.    On arrival, patient was noted to be hypotensive, tachycardic, and tachypneic.    Since patient was discharged, he had worsening dysphagia was unable to tolerate p.o..  He had a PEG tube placed at OSH      Review of patient's allergies indicates:   Allergen Reactions    Nsaids (non-steroidal anti-inflammatory drug) Hives     With ASA and Ibuprofen    Penicillins Hives and Itching     History reviewed. No pertinent past medical history.  Past Surgical History:   Procedure Laterality Date    ESOPHAGOGASTRODUODENOSCOPY N/A 11/21/2023    Procedure: EGD (ESOPHAGOGASTRODUODENOSCOPY);  Surgeon: Princess Voss MD;  Location: [address];  Service: Gastroenterology;  Laterality: N/A;     History reviewed. No pertinent family history.  Social History     Tobacco Use    Smoking status: Never     Passive exposure: Never    Smokeless tobacco: Never   Substance Use Topics    Alcohol use: Not Currently    Drug use: Never     Review of Systems    Physical Exam     Initial Vitals [12/12/23 1610]   BP Pulse Resp Temp SpO2   (!) 87/53 108 (!) 24 97  °F (36.1 °C) 98 %      MAP       --         Physical Exam    Nursing note and vitals reviewed.  Constitutional: He appears distressed.   + appears ill     HENT:   Mouth/Throat: Oropharynx is clear and moist.   Eyes: Conjunctivae are normal.   Neck: Neck supple.   Cardiovascular:  Regular rhythm and intact distal pulses.           + tachycardia   Pulmonary/Chest: Breath sounds normal. He has no wheezes. He has no rales.   Abdominal: Abdomen is soft. Bowel sounds are normal. He exhibits distension. There is abdominal tenderness (generalized).   + tympanic, + peg tube in place with foul smelling drainage around the stoma   Musculoskeletal:         General: Edema present.      Cervical back: Neck supple.     Lymphadenopathy:     He has no cervical adenopathy.   Neurological: He is alert and oriented to person, place, and time.   Skin: No rash noted.   Psychiatric: He has a normal mood and affect.         ED Course   Procedures  Labs Reviewed   PROCALCITONIN - Abnormal; Notable for the following components:       Result Value    Procalcitonin 0.64 (*)     All other components within normal limits   CULTURE, BLOOD   CULTURE, BLOOD   LIPASE   HIV 1 / 2 ANTIBODY    Narrative:     Release to patient->Immediate   HEPATITIS C ANTIBODY    Narrative:     Release to patient->Immediate   URINALYSIS, REFLEX TO URINE CULTURE   ISTAT LACTATE     EKG Readings: (Independently Interpreted)   EKG:  Sinus tachycardia 106, left axis, no acute ischemic changes       Imaging Results              CT Abdomen Pelvis With IV Contrast NO Oral Contrast (Final result)  Result time 12/12/23 20:00:13      Final result by Amilcar Robertson MD (12/12/23 20:00:13)                   Impression:      1. No significant change from the PET-CT scan few hours prior.  2. Large mass involving the distal esophagus, stomach and adjacent mesentery with omental and peritoneal metastatic disease.  Metastatic disease noted to the retroperitoneum, pleura, mediastinum  and bones.  3. Peg tube appears normally positioned.  4. Stable bibasilar pleural effusions left greater than right.  5. Stable moderate diffuse ascites      Electronically signed by: Amilcar Preston  Date:    12/12/2023  Time:    20:00               Narrative:    EXAMINATION:  CT ABDOMEN PELVIS WITH IV CONTRAST    CLINICAL HISTORY:  Abdominal pain, post-op;Abdominal abscess/infection suspected;    TECHNIQUE:  Low dose axial images, sagittal and coronal reformations were obtained from the lung bases to the pubic symphysis following the IV administration of 100 mL of Omnipaque 350 .  Oral contrast was not administered.    COMPARISON:  11/20/2023, 12/12/2023    FINDINGS:  Additional significant history of known metastatic disease.    Abdomen:    - Lower thorax:Pleural based and mediastinal based metastatic disease in the lower thorax adjacent to the heart, right lung base and right anterior chest wall.    - Lung bases: Small left basilar pleural effusion.  Trace right pleural effusion also.    - Liver: Stable small hypodense focus in the right hepatic lobe on axial 39 likely a small bilobular cyst.    - Gallbladder: No calcified gallstones.    - Bile Ducts: No evidence of intra or extra hepatic biliary ductal dilation.    - Spleen: Negative.    - Kidneys: Probable small subcentimeter cysts in the left kidney.  Probable small subcentimeter cyst on the right also.  No stone, soft tissue mass or hydronephrosis.    - Adrenals: Unremarkable.    - Pancreas: No mass or peripancreatic fat stranding.    - Retroperitoneum:  Bulky periaortic retroperitoneal adenopathy.    - Vascular: No abdominal aortic aneurysm.    - Abdominal wall:  Unremarkable.    Pelvis:    Urinary bladder is within normal limits.    Moderate ascites in the abdomen and pelvis.    Bowel/Mesentery:    Diverticulosis of the left colon.  No evidence of acute diverticulitis.  Retained bowel contrast in the colon and rectum.    No evidence of bowel  obstruction.    Bones:  No acute osseous abnormality and no suspicious lytic or blastic lesion.    Circumferential wall thickening/mass associated with the distal esophagus extending to a large mass adjacent to the stomach with involvement of the lesser curvature.  The no change from the PET-CT 5 hours prior.    Peg tube present anteriorly near the midline.  Positioning appears adequate.    Omental and peritoneal metastatic disease.    Osseous metastatic disease better characterized on prior PET-CT.    No abscess is identified.    Laminectomy defect in the lumbar spine with postoperative changes.                                       X-Ray Chest AP Portable (Final result)  Result time 12/12/23 18:10:54      Final result by Everardo Lieberman MD (12/12/23 18:10:54)                   Impression:      As above.      Electronically signed by: Everardo Lieberman MD  Date:    12/12/2023  Time:    18:10               Narrative:    EXAMINATION:  XR CHEST AP PORTABLE    CLINICAL HISTORY:  Sepsis;    TECHNIQUE:  Single frontal view of the chest was performed.    COMPARISON:  PET CT same date    FINDINGS:  Right chest vascular port tip overlies the proximal SVC.Cardiomediastinal silhouette is midline and within normal limits for age.  Pulmonary vasculature and hilar contours are within normal limits.    Few scattered linear opacities throughout the lungs consistent with minimal platelike scarring versus atelectasis.  Subtle hazy opacification of the left lung base with slight blunting of the costophrenic angle suggesting small left pleural effusion better demonstrated on PET CT performed earlier same day.  The trace right pleural effusion is not well demonstrated on current radiograph.  No consolidation or pneumothorax.    No acute osseous process seen.  PA and lateral views can be obtained.                                       Medications   lactated ringers bolus 1,000 mL (1,000 mLs Intravenous New Bag 12/12/23 2048)   fentaNYL 50  mcg/mL injection 25 mcg (has no administration in time range)   lactated ringers bolus 3,063 mL (0 mLs Intravenous Stopped 12/12/23 2027)   cefTRIAXone (ROCEPHIN) 2 g in dextrose 5 % in water (D5W) 100 mL IVPB (MB+) (0 g Intravenous Stopped 12/12/23 1814)   metronidazole IVPB 500 mg (0 mg Intravenous Stopped 12/12/23 1915)   morphine injection 6 mg (6 mg Intravenous Given 12/12/23 1810)   iohexoL (OMNIPAQUE 350) injection 100 mL (100 mLs Intravenous Given 12/12/23 1843)     Medical Decision Making  Emergent evaluation of 58-year-old male presenting today for abdominal distention, inability to tolerate p.o., PEG tube malfunction in setting of newly diagnosed gastric neuroendocrine tumor.    Vitals reviewed    Differential includes but not limited to intra-abdominal infection, obstruction, PEG tube malfunction, PERLA, sepsis.    Patient treated with Zosyn to cover for intra-abdominal infection.  CT abdomen pelvis order.    PEG tube flushed without resistance.  Unable to withdraw gastric fluid    Amount and/or Complexity of Data Reviewed  External Data Reviewed: notes.     Details: Labs from Oncology Clinic reviewed, [place] [id number], multiple electrolyte derangement including PERLA, hyperkalemia  Labs: ordered. Decision-making details documented in ED Course.  Radiology: ordered and independent interpretation performed.  ECG/medicine tests: ordered and independent interpretation performed.    Risk  Prescription drug management.              Attending Attestation:         Attending Critical Care:   Critical Care Times:   ==============================================================  Total Critical Care Time - exclusive of procedural time: 45 minutes.  ==============================================================  Critical care was necessary to treat or prevent imminent or life-threatening deterioration of the following conditions: sepsis (hypotension).   Critical care was time spent personally by me on the following  activities: ordering lab, x-rays, and/or EKG, development of treatment plan with patient or relative, ordering and performing treatments and interventions, evaluation of patient's response to treatment, discussion with consultants and re-evaluation of patient's conition.   Critical Care Condition: potentially life-threatening           ED Course as of 12/12/23 2116 Tue Dec 12, 2023   1832 Procalcitonin(!): 0.64 [GM]   1832 POC Lactate: 1.79 [GM]   1832 Lipase: 18 [GM]   1904 POC Lactate: 1.79 [GM]   1904 Lipase: 18 [GM]   2011 Impression:     1. No significant change from the PET-CT scan few hours prior.  2. Large mass involving the distal esophagus, stomach and adjacent mesentery with omental and peritoneal metastatic disease.  Metastatic disease noted to the retroperitoneum, pleura, mediastinum and bones.  3. Peg tube appears normally positioned.  4. Stable bibasilar pleural effusions left greater than right.  5. Stable moderate diffuse ascites        Electronically signed by: Amilcar Preston  Date:                                            12/12/2023  Time:                                           20:00   [GM]   2042 Repeat BP 90/50.     [GM]   2114 Patient turned over to oncoming team pending admission.   [GM]   2115 Labs reviewed with leukocytosis, multiple electrolyte derangements including PERLA, hyperkalemia, hyponatremia.  CT scan does not show an obstruction, intra-abdominal abscess or infection.  I did treat him with Zosyn for suspected infection.  He has been treated with multiple L of IV fluids, BP borderline low with maps of 65.  Lactate normal.  Plan is to admit to Hospital Medicine, step-down for continued treatment.  Will likely need GI consult tomorrow although his feeding tube flushes well.  I am unable to withdrawal gastric contents.  Patient comfortable with plan for admission, per Dr. Lopez, will need IP palliative care   [GM]      ED Course User Index  [] Kathy Hill MD                            Clinical Impression:  Final diagnoses:  [R00.0] Tachycardia  [A41.9] Sepsis, due to unspecified organism, unspecified whether acute organ dysfunction present (Primary)          ED Disposition Condition    Admit Stable                Kathy Hill MD  12/12/23 9366

## 2023-12-13 NOTE — CONSULTS
Patient seen and evaluated by critical care medicine. To be admitted to ICU for further management. Full H&P to follow.     KRISTAL Cui, [facility]  Pulmonary Critical Care Medicine   12/12/2023

## 2023-12-13 NOTE — PLAN OF CARE
[facility] - Cardiac Medical ICU  Initial Discharge Assessment       Primary Care Provider: Codi Chavez FNP    Admission Diagnosis: Tachycardia [R00.0]  Acute kidney injury [N17.9]  Sepsis, due to unspecified organism, unspecified whether acute organ dysfunction present [A41.9]    Admission Date: 12/12/2023  Expected Discharge Date: 12/17/2023    Transition of Care Barriers: Underinsured    Payor: MEDICAID / Plan: [facility] CONNECT / Product Type: Managed Medicaid /     Extended Emergency Contact Information  Primary Emergency Contact: MARICEL SINCLAIR  Address: [address] [place] [address]  Home Phone: [phone]  Mobile Phone: [phone]  Relation: Spouse  Preferred language: English    Discharge Plan A: Home with family  Discharge Plan B: Home with family      [facility] [address] [place] [address] [address] [facility] [address] [facility]  [address] [facility] 90 [facility] [address]  Phone: [phone] Fax: [phone]    [id number] [address] AT [facility] [address]  [address]  [facility] [address]  Phone: [phone] Fax: [phone]      Transferred from:     History reviewed. No pertinent past medical history.      CM met with patient and Maricel Benjamin (spouse) [phone]  in room for Discharge Planning Assessment.  Patient was able to answer questions but wanted spouse to answer for him.  Per Maricel, patient/Maricel live in a 1-story home with 3 step(s) to enter.   Per Maricel, patient was dependent with ADLS and used straight cane and wheelchair for ambulation. Per Maricel, patient is not on dialysis and does not take Coumadin.  Patient will have help from Maricel Benjamin (spouse) [phone]  upon discharge.   Discharge Planning Booklet given to patient/family and discussed.  All questions addressed.  CM will follow for needs.      Discharge Plan A and Plan B have been determined by review of patient's  clinical status, future medical and therapeutic needs, and coverage/benefits for post-acute care in coordination with multidisciplinary team members.        Initial Assessment (most recent)       Adult Discharge Assessment - 12/13/23 1407          Discharge Assessment    Assessment Type Discharge Planning Assessment     Confirmed/corrected address, phone number and insurance Yes     Confirmed Demographics Correct on Facesheet     Source of Information family     When was your last doctors appointment? 12/12/23     Communicated VERA with patient/caregiver Date not available/Unable to determine     Reason For Admission septic shock, tachycardia     People in Home spouse     Facility Arrived From: [facility] appointment     Do you expect to return to your current living situation? Yes     Do you have help at home or someone to help you manage your care at home? Yes     Who are your caregiver(s) and their phone number(s)? Adela Sourav (spouse) [phone]     Prior to hospitilization cognitive status: Alert/Oriented     Current cognitive status: Alert/Oriented     Walking or Climbing Stairs Difficulty yes     Walking or Climbing Stairs ambulation difficulty, requires equipment     Mobility Management straight cane     Dressing/Bathing Difficulty yes     Dressing/Bathing bathing difficulty, assistance 1 person;dressing difficulty, assistance 1 person     Dressing/Bathing Management spouse assists in these tasks     Equipment Currently Used at Home cane, straight;wheelchair     Readmission within 30 days? Yes     Patient currently being followed by outpatient case management? No     Do you currently have service(s) that help you manage your care at home? No     Do you take prescription medications? Yes     Do you have prescription coverage? Yes     Coverage MEDICAID [facility]     Do you have any problems affording any of your prescribed medications? No     Is the patient taking medications as  prescribed? yes     Who is going to help you get home at discharge? Adela Benjamin (spouse) [phone]     How do you get to doctors appointments? family or friend will provide     Are you on dialysis? No     Do you take coumadin? No     Discharge Plan A Home with family     Discharge Plan B Home with family     DME Needed Upon Discharge  other (see comments)   TBD    Discharge Plan discussed with: Spouse/sig other;Patient     Name(s) and Number(s) Adela Benjamin (spouse) [phone]     Transition of Care Barriers Underinsured        Physical Activity    On average, how many days per week do you engage in moderate to strenuous exercise (like a brisk walk)? 0 days     On average, how many minutes do you engage in exercise at this level? 0 min        Financial Resource Strain    How hard is it for you to pay for the very basics like food, housing, medical care, and heating? Not very hard        Housing Stability    In the last 12 months, was there a time when you were not able to pay the mortgage or rent on time? No     In the last 12 months, how many places have you lived? 1     In the last 12 months, was there a time when you did not have a steady place to sleep or slept in a shelter (including now)? No        Transportation Needs    In the past 12 months, has lack of transportation kept you from medical appointments or from getting medications? No     In the past 12 months, has lack of transportation kept you from meetings, work, or from getting things needed for daily living? No        Food Insecurity    Within the past 12 months, you worried that your food would run out before you got the money to buy more. Never true     Within the past 12 months, the food you bought just didn't last and you didn't have money to get more. Never true        Stress    Do you feel stress - tense, restless, nervous, or anxious, or unable to sleep at night because your mind is troubled all the time - these days? Rather much         Social Connections    In a typical week, how many times do you talk on the phone with family, friends, or neighbors? More than three times a week     How often do you get together with friends or relatives? More than three times a week     How often do you attend Uatsdin or Yazidism services? Never     Do you belong to any clubs or organizations such as Uatsdin groups, unions, fraternal or athletic groups, or school groups? No     How often do you attend meetings of the clubs or organizations you belong to? Never     Are you , , , , never , or living with a partner?         Alcohol Use    Q1: How often do you have a drink containing alcohol? Never     Q2: How many drinks containing alcohol do you have on a typical day when you are drinking? Patient does not drink     Q3: How often do you have six or more drinks on one occasion? Never        OTHER    Name(s) of People in Home Maricel Benjamin (spouse) [phone]                            PCP:  Codi Chavez, P  [phone]        Pharmacy:    [place] [address]  Phone: [phone] Fax: [phone]    [id number] [place] [address] [address] [facility] [address]  [address]  [facility] [address]  Phone: [phone] Fax: [phone]        Emergency Contacts:  Extended Emergency Contact Information  Primary Emergency Contact: MARICEL SINCLAIR  Address: [address] [place] [address]  Home Phone: [phone]  Mobile Phone: [phone]  Relation: Spouse  Preferred language: English      Insurance:    Payor: MEDICAID / Plan: [facility] CONNECT / Product Type: Managed Medicaid /     Dimple Francois RN     [phone]      12/13/2023  2:19 PM

## 2023-12-13 NOTE — PROVIDER PROGRESS NOTES - EMERGENCY DEPT.
Signout Note    I received signout from the previous provider.     Chief complaint:  Multiple complaints (Sent from [facility] , not on chemo at present, abnormal kidney function, need to check feeding tube)      Pertinent history &exam:  David Benjamin Jr. is a 58 y.o. male with pertinent PMH of dysphagia secondary to a neuroendocrine tumor, recent PEG tube placement at outside facility who presented to emergency department with complaint of hypotension at a clinic appointment today.  He was found to have a leukocytosis, and acute kidney injury, and soft blood pressures.  You received 3 L of IV fluid and has thus far not required vasopressor support.  No obvious source, although there is some foul-smelling discharge around the PEG tube site.  He was signed out pending period of observation for a blood pressure trend for final disposition    Vitals:    12/13/23 0023   BP: 95/64   Pulse: 87   Resp: 19   Temp:        Imaging Studies:    CT Abdomen Pelvis With IV Contrast NO Oral Contrast   Final Result      1. No significant change from the PET-CT scan few hours prior.   2. Large mass involving the distal esophagus, stomach and adjacent mesentery with omental and peritoneal metastatic disease.  Metastatic disease noted to the retroperitoneum, pleura, mediastinum and bones.   3. Peg tube appears normally positioned.   4. Stable bibasilar pleural effusions left greater than right.   5. Stable moderate diffuse ascites         Electronically signed by: Amilcar Preston   Date:    12/12/2023   Time:    20:00      X-Ray Chest AP Portable   Final Result      As above.         Electronically signed by: Everardo Lieberman MD   Date:    12/12/2023   Time:    18:10          Medications Given:  Medications   LIDOcaine 5 % patch 1 patch (1 patch Transdermal Patch Applied 12/12/23 2237)   sodium chloride 0.9% flush 10 mL (has no administration in time range)   heparin (porcine) injection 5,000 Units (has no administration in time range)    vancomycin - pharmacy to dose (has no administration in time range)   NORepinephrine bitartrate-D5W 4 mg/250 mL (16 mcg/mL) PERIPHERAL access infusion (0.04 mcg/kg/min × 102.1 kg Intravenous Rate/Dose Change 12/13/23 0010)   vancomycin 1,500 mg in dextrose 5 % (D5W) 250 mL IVPB (Vial-Mate) (1,500 mg Intravenous Incomplete 12/13/23 0052)   cefTRIAXone (ROCEPHIN) 2 g in dextrose 5 % in water (D5W) 100 mL IVPB (MB+) (has no administration in time range)   lactated ringers bolus 3,063 mL (0 mLs Intravenous Stopped 12/12/23 2027)   cefTRIAXone (ROCEPHIN) 2 g in dextrose 5 % in water (D5W) 100 mL IVPB (MB+) (0 g Intravenous Stopped 12/12/23 1814)   metronidazole IVPB 500 mg (0 mg Intravenous Stopped 12/12/23 1915)   morphine injection 6 mg (6 mg Intravenous Given 12/12/23 1810)   iohexoL (OMNIPAQUE 350) injection 100 mL (100 mLs Intravenous Given 12/12/23 1843)   lactated ringers bolus 1,000 mL (0 mLs Intravenous Stopped 12/12/23 2142)   fentaNYL 50 mcg/mL injection 25 mcg (25 mcg Intravenous Given 12/12/23 2123)       Pending Items/ MDM:  58 y.o. male with above complaint.  Patient did eventually dropped his blood pressure into the 70s systolic.  Levophed was started.  Medical ICU consulted.    Critical Care Procedure Note    Authorized and Performed by: Suzy Sullivan    Total critical care time: Approximately 20 minutes    Due to a high probability of clinically significant, life threatening deterioration, the patient required my highest level of preparedness to intervene emergently and I personally spent this critical care time directly and personally managing the patient. This critical care time included obtaining a history; examining the patient; pulse oximetry; ordering and review of studies; arranging urgent treatment with development of a management plan; evaluation of patient's response to treatment; frequent reassessment; and, discussions with other providers.    This critical care time was performed to  assess and manage the high probability of imminent, life-threatening deterioration that could result in multi-organ failure. It was exclusive of separately billable procedures and treating other patients and teaching time.      Diagnostic Impression:    1. Sepsis, due to unspecified organism, unspecified whether acute organ dysfunction present    2. Tachycardia    3. Acute kidney injury         ED Disposition Condition    Admit Stable               Suzy Sullivan MD  Emergency Medicine

## 2023-12-13 NOTE — ED NOTES
Assumed care of patient. Patient is alert and resting comfortably in bed in NAD. BP, cardiac, and O2 monitoring continued. Family member at bedside. Patient updated on plan of care, pt denies any needs or complaints at this time. Bed locked in lowest position, side rails up x2, call light within reach.

## 2023-12-13 NOTE — SUBJECTIVE & OBJECTIVE
History reviewed. No pertinent past medical history.    Past Surgical History:   Procedure Laterality Date    ESOPHAGOGASTRODUODENOSCOPY N/A 11/21/2023    Procedure: EGD (ESOPHAGOGASTRODUODENOSCOPY);  Surgeon: Princess Voss MD;  Location: [address]);  Service: Gastroenterology;  Laterality: N/A;       Review of patient's allergies indicates:   Allergen Reactions    Nsaids (non-steroidal anti-inflammatory drug) Hives     With ASA and Ibuprofen    Penicillins Hives and Itching       Family History    None       Tobacco Use    Smoking status: Never     Passive exposure: Never    Smokeless tobacco: Never   Substance and Sexual Activity    Alcohol use: Not Currently    Drug use: Never    Sexual activity: Not Currently      Review of Systems   Constitutional:  Positive for activity change, appetite change and fatigue.   Respiratory:  Negative for chest tightness and shortness of breath.    Cardiovascular:  Negative for chest pain and leg swelling.   Gastrointestinal:  Positive for abdominal distention, abdominal pain and nausea. Negative for vomiting.   Genitourinary:  Positive for decreased urine volume.   Musculoskeletal:  Negative for back pain and neck pain.   Skin:  Negative for color change.   Neurological:  Positive for weakness. Negative for syncope and headaches.   Psychiatric/Behavioral:  Negative for agitation and confusion.      Objective:     Vital Signs (Most Recent):  Temp: 98 °F (36.7 °C) (12/12/23 2330)  Pulse: 86 (12/12/23 2330)  Resp: (!) 24 (12/12/23 2330)  BP: (!) 85/50 (12/12/23 2330)  SpO2: 97 % (12/12/23 2330) Vital Signs (24h Range):  Temp:  [97 °F (36.1 °C)-98.1 °F (36.7 °C)] 98 °F (36.7 °C)  Pulse:  [] 86  Resp:  [18-28] 24  SpO2:  [94 %-100 %] 97 %  BP: ()/(48-60) 85/50   Weight: 102.1 kg (225 lb)  Body mass index is 29.69 kg/m².      Intake/Output Summary (Last 24 hours) at 12/12/2023 2342  Last data filed at 12/12/2023 2331  Gross per 24 hour   Intake 1102.91 ml    Output --   Net 1102.91 ml          Physical Exam  Vitals and nursing note reviewed.   Constitutional:       Appearance: He is obese. He is ill-appearing.   HENT:      Head: Normocephalic and atraumatic.      Mouth/Throat:      Pharynx: Oropharynx is clear. No oropharyngeal exudate.   Eyes:      General: No scleral icterus.     Pupils: Pupils are equal, round, and reactive to light.   Cardiovascular:      Rate and Rhythm: Normal rate.      Pulses: Normal pulses.      Heart sounds: No murmur heard.  Abdominal:      General: Bowel sounds are decreased. There is distension.      Tenderness: There is abdominal tenderness.      Comments: PEG tube with purulent drainage around site   Musculoskeletal:      Right lower leg: No edema.      Left lower leg: No edema.   Skin:     General: Skin is dry.      Capillary Refill: Capillary refill takes less than 2 seconds.      Coloration: Skin is pale. Skin is not jaundiced.   Neurological:      General: No focal deficit present.      Mental Status: He is oriented to person, place, and time. Mental status is at baseline.      GCS: GCS eye subscore is 4. GCS verbal subscore is 5. GCS motor subscore is 6.   Psychiatric:         Mood and Affect: Mood normal.            Vents:     Lines/Drains/Airways       Drain  Duration                  Gastrostomy/Enterostomy 12/12/23 2053 LUQ <1 day              Peripheral Intravenous Line  Duration                  Peripheral IV - Single Lumen 12/12/23 1638 22 G Anterior;Left Hand <1 day         Peripheral IV - Single Lumen 12/12/23 1724 16 G;1 3/4 in Right Antecubital <1 day                  Significant Labs:    CBC/Anemia Profile:  Recent Labs   Lab 12/12/23  1403   WBC 19.58*   HGB 11.3*   HCT 33.4*      MCV 84   RDW 14.3        Chemistries:  Recent Labs   Lab 12/12/23  1403   *   K 5.5*   CL 94*   CO2 20*   BUN 63*   CREATININE 1.7*   CALCIUM 8.7   ALBUMIN 2.2*   PROT 5.9*   BILITOT 0.4   ALKPHOS 64   ALT 12   AST 31       All  pertinent labs within the past 24 hours have been reviewed.    Significant Imaging: I have reviewed all pertinent imaging results/findings within the past 24 hours.

## 2023-12-13 NOTE — PLAN OF CARE
12/13/23 1420   Readmission   Why were you hospitalized in the last 30 days? Mass of gastroesophageal junction   Why were you readmitted? New medical problem   When you left the hospital how did you feel? okay   When you left the hospital where did you go? Home with Family   Did patient/caregiver refused recommended DC plan? No   Tell me about what happened between when you left the hospital and the day you returned. Had Oncology Clinic appointment yesterday and was sent to ED by MD.   When did you start not feeling well? a few days ago   Did you try to manage your symptoms your self? No   Did you call anyone? No   Did you try to see or did see a doctor or nurse before you came? Yes   Were you seen? Yes  (was seen at clinic appointment and sent to ED by MD.)   Did you have  a follow-up appointment on discharge? Yes   Did you go? Yes   Was this a planned readmission? No       Patient went to appointment yesterday 12/12/2023 and was sent to ED by MD.    Dimple Francois RN     [phone]

## 2023-12-13 NOTE — ASSESSMENT & PLAN NOTE
See ACP 12/13    Insight/goals of care- good insight despite acuity of decline and new diagnosis of incurable cancer. Remains hopeful he can improve to the point where he might benefit from palliative chemo but understands the limitations if he continues to decline. Clear limits on care if he appears to be dying.     Social support- supportive wife and parents (still living) has may friends and strong Yazidi community in [place]    Psychological- fair coping strategies given gravity of recent developments     Spiritual- Amish, spiritual needs met by local community    Symptom management- cancer-related pain (previously on tramadol, oxy5/10s), also with hiccups and nausea    Recommendations  -DNR, full support, would consider procedures and vasopressors as long as there remains a reasonable chance of recovery  -will continue to engage on other limits he might place on his care (did not specifically address CELY or HD)  -recommend continuing hydromorphone 0.2mg at q3hr PRN for pain as currently appears effective  -can consider adding short acting oral oxy 5mg once reliable enteral access obtained  -recommend trial of chlorpromazine 25mg IV q8hr PRN for uncomfortable hiccups/nausea

## 2023-12-13 NOTE — ASSESSMENT & PLAN NOTE
Patient with PERLA on admission, likely pre-renal in etiology in setting of decreased intake with PEG tube  dysfunction.      Cr at admission: 1.7  Cr at baseline: 0.7  Estimated Creatinine Clearance: 58 mL/min (A) (based on SCr of 1.7 mg/dL (H)).     -- s/p sepsis fluid bolus in ED  -- Trending CMP  -- Renally dose medications  -- avoid nephrotoxic agents   -- Strict I&O   -- MAP > 65. Vasopressors   -- Hgb > 7

## 2023-12-13 NOTE — SUBJECTIVE & OBJECTIVE
No current facility-administered medications on file prior to encounter.     Current Outpatient Medications on File Prior to Encounter   Medication Sig    oxyCODONE (ROXICODONE) 5 MG immediate release tablet Take 1 tablet (5 mg total) by mouth every 6 (six) hours as needed for Pain.       Review of patient's allergies indicates:   Allergen Reactions    Nsaids (non-steroidal anti-inflammatory drug) Hives     With ASA and Ibuprofen    Penicillins Hives and Itching       History reviewed. No pertinent past medical history.  Past Surgical History:   Procedure Laterality Date    ESOPHAGOGASTRODUODENOSCOPY N/A 11/21/2023    Procedure: EGD (ESOPHAGOGASTRODUODENOSCOPY);  Surgeon: Princess Voss MD;  Location: [address]);  Service: Gastroenterology;  Laterality: N/A;     Family History    None       Tobacco Use    Smoking status: Never     Passive exposure: Never    Smokeless tobacco: Never   Substance and Sexual Activity    Alcohol use: Not Currently    Drug use: Never    Sexual activity: Not Currently     Review of Systems   Constitutional:  Positive for appetite change. Negative for fever.   Gastrointestinal:  Positive for abdominal pain. Negative for constipation, diarrhea, nausea and vomiting.   Genitourinary:  Negative for difficulty urinating.   Skin:  Positive for wound.   Neurological:  Positive for weakness.     Objective:     Vital Signs (Most Recent):  Temp: 97.7 °F (36.5 °C) (12/13/23 1500)  Pulse: 102 (12/13/23 1600)  Resp: 16 (12/13/23 1600)  BP: (!) 84/57 (12/13/23 1600)  SpO2: 97 % (12/13/23 1600) Vital Signs (24h Range):  Temp:  [97.5 °F (36.4 °C)-98 °F (36.7 °C)] 97.7 °F (36.5 °C)  Pulse:  [] 102  Resp:  [15-28] 16  SpO2:  [94 %-100 %] 97 %  BP: ()/(48-67) 84/57     Weight: 108 kg (238 lb 1.6 oz)  Body mass index is 31.41 kg/m².     Physical Exam  Vitals reviewed.   Constitutional:       General: He is not in acute distress.     Appearance: He is well-developed.   HENT:      Head:  Normocephalic and atraumatic.   Eyes:      General:         Right eye: No discharge.         Left eye: No discharge.      Conjunctiva/sclera: Conjunctivae normal.   Cardiovascular:      Rate and Rhythm: Normal rate and regular rhythm.      Comments: R IJV port, well-healing, overlying steri strips  Pulmonary:      Effort: Pulmonary effort is normal. No respiratory distress.   Abdominal:      General: There is distension.      Palpations: Abdomen is soft.      Tenderness: There is abdominal tenderness (mild, around PEG site). There is no guarding or rebound.      Comments: PEG site wound, larger than tube diameter, drainage of thin white material   Musculoskeletal:         General: No deformity.   Skin:     General: Skin is warm and dry.   Neurological:      Mental Status: He is alert and oriented to person, place, and time.   Psychiatric:         Behavior: Behavior normal.            I have reviewed all pertinent lab results within the past 24 hours.  CBC:   Recent Labs   Lab 12/13/23  0320   WBC 18.75*   RBC 3.55*   HGB 9.7*   HCT 29.8*      MCV 84   MCH 27.3   MCHC 32.6     CMP:   Recent Labs   Lab 12/13/23  0320   *   CALCIUM 8.3*   ALBUMIN 2.0*   PROT 5.4*   *   K 5.4*   CO2 23   CL 95   BUN 59*   CREATININE 1.5*   ALKPHOS 57   ALT 11   AST 33   BILITOT 0.3       Significant Diagnostics:  I have reviewed all pertinent imaging results/findings within the past 24 hours.    Imaging Results              CT Abdomen Pelvis With IV Contrast NO Oral Contrast (Final result)  Result time 12/12/23 20:00:13      Final result by Amilcar Robertson MD (12/12/23 20:00:13)                   Impression:      1. No significant change from the PET-CT scan few hours prior.  2. Large mass involving the distal esophagus, stomach and adjacent mesentery with omental and peritoneal metastatic disease.  Metastatic disease noted to the retroperitoneum, pleura, mediastinum and bones.  3. Peg tube appears normally  positioned.  4. Stable bibasilar pleural effusions left greater than right.  5. Stable moderate diffuse ascites      Electronically signed by: Amilcar Preston  Date:    12/12/2023  Time:    20:00               Narrative:    EXAMINATION:  CT ABDOMEN PELVIS WITH IV CONTRAST    CLINICAL HISTORY:  Abdominal pain, post-op;Abdominal abscess/infection suspected;    TECHNIQUE:  Low dose axial images, sagittal and coronal reformations were obtained from the lung bases to the pubic symphysis following the IV administration of 100 mL of Omnipaque 350 .  Oral contrast was not administered.    COMPARISON:  11/20/2023, 12/12/2023    FINDINGS:  Additional significant history of known metastatic disease.    Abdomen:    - Lower thorax:Pleural based and mediastinal based metastatic disease in the lower thorax adjacent to the heart, right lung base and right anterior chest wall.    - Lung bases: Small left basilar pleural effusion.  Trace right pleural effusion also.    - Liver: Stable small hypodense focus in the right hepatic lobe on axial 39 likely a small bilobular cyst.    - Gallbladder: No calcified gallstones.    - Bile Ducts: No evidence of intra or extra hepatic biliary ductal dilation.    - Spleen: Negative.    - Kidneys: Probable small subcentimeter cysts in the left kidney.  Probable small subcentimeter cyst on the right also.  No stone, soft tissue mass or hydronephrosis.    - Adrenals: Unremarkable.    - Pancreas: No mass or peripancreatic fat stranding.    - Retroperitoneum:  Bulky periaortic retroperitoneal adenopathy.    - Vascular: No abdominal aortic aneurysm.    - Abdominal wall:  Unremarkable.    Pelvis:    Urinary bladder is within normal limits.    Moderate ascites in the abdomen and pelvis.    Bowel/Mesentery:    Diverticulosis of the left colon.  No evidence of acute diverticulitis.  Retained bowel contrast in the colon and rectum.    No evidence of bowel obstruction.    Bones:  No acute osseous abnormality and  no suspicious lytic or blastic lesion.    Circumferential wall thickening/mass associated with the distal esophagus extending to a large mass adjacent to the stomach with involvement of the lesser curvature.  The no change from the PET-CT 5 hours prior.    Peg tube present anteriorly near the midline.  Positioning appears adequate.    Omental and peritoneal metastatic disease.    Osseous metastatic disease better characterized on prior PET-CT.    No abscess is identified.    Laminectomy defect in the lumbar spine with postoperative changes.                                       X-Ray Chest AP Portable (Final result)  Result time 12/12/23 18:10:54      Final result by Everardo Lieberman MD (12/12/23 18:10:54)                   Impression:      As above.      Electronically signed by: Everardo Lieberman MD  Date:    12/12/2023  Time:    18:10               Narrative:    EXAMINATION:  XR CHEST AP PORTABLE    CLINICAL HISTORY:  Sepsis;    TECHNIQUE:  Single frontal view of the chest was performed.    COMPARISON:  PET CT same date    FINDINGS:  Right chest vascular port tip overlies the proximal SVC.Cardiomediastinal silhouette is midline and within normal limits for age.  Pulmonary vasculature and hilar contours are within normal limits.    Few scattered linear opacities throughout the lungs consistent with minimal platelike scarring versus atelectasis.  Subtle hazy opacification of the left lung base with slight blunting of the costophrenic angle suggesting small left pleural effusion better demonstrated on PET CT performed earlier same day.  The trace right pleural effusion is not well demonstrated on current radiograph.  No consolidation or pneumothorax.    No acute osseous process seen.  PA and lateral views can be obtained.

## 2023-12-13 NOTE — PROGRESS NOTES
"Pharmacokinetic Initial Assessment: IV Vancomycin    Assessment/Plan:    Initiate intravenous vancomycin with loading dose of 1500 mg once with subsequent doses when random concentrations are less than 20 mcg/mL  Desired empiric serum trough concentration is 10 to 20 mcg/mL  Draw vancomycin random level on 12/14/23 at 0100.  Pharmacy will continue to follow and monitor vancomycin.      Please contact pharmacy at extension [phone] with any questions regarding this assessment.     Thank you for the consult,   Ebonie Willard       Patient brief summary:  David Benjamin Jr. is a 58 y.o. male initiated on antimicrobial therapy with IV Vancomycin for treatment of suspected bacteremia    Drug Allergies:   Review of patient's allergies indicates:   Allergen Reactions    Nsaids (non-steroidal anti-inflammatory drug) Hives     With ASA and Ibuprofen    Penicillins Hives and Itching       Actual Body Weight:   102.1 kg    Renal Function:   Estimated Creatinine Clearance: 58 mL/min (A) (based on SCr of 1.7 mg/dL (H)).,     Dialysis Method (if applicable):  N/A    CBC (last 72 hours):  Recent Labs   Lab Result Units 12/12/23  1403   WBC K/uL 19.58*   Hemoglobin g/dL 11.3*   Hematocrit % 33.4*   Platelets K/uL 320   Gran % % 87.6*   Lymph % % 6.2*   Mono % % 4.5   Eosinophil % % 0.0   Basophil % % 0.2   Differential Method  Automated       Metabolic Panel (last 72 hours):  Recent Labs   Lab Result Units 12/12/23  1403 12/12/23  2037   Sodium mmol/L 130*  --    Potassium mmol/L 5.5*  --    Chloride mmol/L 94*  --    CO2 mmol/L 20*  --    Glucose mg/dL 108  --    Glucose, UA   --  Negative   BUN mg/dL 63*  --    Creatinine mg/dL 1.7*  --    Albumin g/dL 2.2*  --    Total Bilirubin mg/dL 0.4  --    Alkaline Phosphatase U/L 64  --    AST U/L 31  --    ALT U/L 12  --        Drug levels (last 3 results):  No results for input(s): "VANCOMYCINRA", "VANCORANDOM", "VANCOMYCINPE", "VANCOPEAK", "VANCOMYCINTR", "VANCOTROUGH" in the " last 72 hours.    Microbiologic Results:  Microbiology Results (last 7 days)       Procedure Component Value Units Date/Time    Blood culture x two cultures. Draw prior to antibiotics. [[id number]] Collected: 12/12/23 1649    Order Status: Sent Specimen: Blood from Peripheral, Hand, Right Updated: 12/12/23 1719    Blood culture x two cultures. Draw prior to antibiotics. [[id number]] Collected: 12/12/23 1638    Order Status: Sent Specimen: Blood from Peripheral, Hand, Left Updated: 12/12/23 1707

## 2023-12-13 NOTE — CONSULTS
[facility] - Card[facility] Medical [facility]  Palliative Medicine  Consult Note    Patient Name: David Benjamin Jr.  MRN: [id number]  Admission Date: 12/12/2023  Hospital Length of Stay: 1 days  Code Status: DNR   Attending Provider: Holger Brasher*  Consulting Provider: Rafal Heller MD  Primary Care Physician: Codi Chavez FNP  Principal Problem:Septic shock    Patient information was obtained from patient, past medical records, and primary team.      Inpatient consult to Palliative Care  Consult performed by: Rafal Heller MD  Consult ordered by: Bull Palmer MD        Assessment/Plan:     Oncology  Primary neuroendocrine carcinoma of stomach  58 year old male with recent diagnosis of now metastatic neuroendocrine carcinoma. Presumed gastric in origin but no distinguishing primary features on path. Admitted for presumed septic shock. Independent prior to this decline but had recent PEG tube due to dysphagia. Planned to initiate carbo/etoposide with Dr Lopez prior to this admit.     -unclear if patient will recover from this acute episode well enough to tolerate chemo  -reasonable to hope for that at this time, but understands he could remain too ill to benefit  -recommend clear and direct communication regarding prognosis and treatment options as course unfolds      GI  PEG tube malfunction  Placed for dysphagia likely related to GE junction tumor burden. Malfunctioning/clogging on admit possibly related to local disease    -agree with surg onc consult to address, could potentially benefit from relocation of tube to site with less disease activity    Palliative Care  Palliative care encounter  See ACP 12/13    Insight/goals of care- good insight despite acuity of decline and new diagnosis of incurable cancer. Remains hopeful he can improve to the point where he might benefit from palliative chemo but understands the limitations if he continues to decline. Clear limits on care if he appears to be  dying.     Social support- supportive wife and parents (still living) has may friends and strong [employer] community in [place]    Psychological- fair coping strategies given gravity of recent developments     Spiritual- Mandaeism, spiritual needs met by local community    Symptom management- cancer-related pain (previously on tramadol, oxy5/10s), also with hiccups and nausea    Recommendations  -DNR, full support, would consider procedures and vasopressors as long as there remains a reasonable chance of recovery  -will continue to engage on other limits he might place on his care (did not specifically address CELY or HD)  -recommend continuing hydromorphone 0.2mg at q3hr PRN for pain as currently appears effective  -can consider adding short acting oral oxy 5mg once reliable enteral access obtained  -recommend trial of chlorpromazine 25mg IV q8hr PRN for uncomfortable hiccups/nausea        Thank you for your consult. I will follow-up with patient. Please contact us if you have any additional questions.    Subjective:     HPI:   Mr. Benjamin is a 58 year old male with ercently diagnosed neuroendocrine gastric cancer with rapid progression including widely metastatic disease (ribs, sacrum, omentum/peritoneal, thoracic and abdominal LN) on recent PET 12/12, who presented the same day from oncology clinic with generalized weakness and malfunctioning PEG tube (recently placed at OSH).  He was found to be hypotensive, tachycardic, tachypneic, with an elevated WBC count and PERLA. Admitted to MICU, started on antibiotics and vasopressors. Palliative care consulted to discuss goals of care and assist with symptom management      Interval History: Chart reviewed including 24h medication use. Patient resting comfortably in bed. Briefly spoke with wife in hallway and re-introduced palliative medicine. She noted she needed to go eat and take a break, provided my condolences. Patient awake and interactive although somewhat  somnolent.     Pain meds past 24 hours: fentanyl 50mcg IVP x1  (~15 OME), morphine 6mg IV x1 (18 OME) 2mg x1 (6 OME), hydromorphone 0.2mg IV x2 (8 OME); total OME ~47    Palliative ROS:  Pain + (relieved by dilaudid 0.2mg)  Dyspnea -  Nausea +  Vomiting -  Constipation -  Anxiety -  Anorexia -  Hiccups +        History reviewed. No pertinent past medical history.    Past Surgical History:   Procedure Laterality Date    ESOPHAGOGASTRODUODENOSCOPY N/A 11/21/2023    Procedure: EGD (ESOPHAGOGASTRODUODENOSCOPY);  Surgeon: Princess Voss MD;  Location: [facility] ([address] [place] [address]);  Service: Gastroenterology;  Laterality: N/A;       Review of patient's allergies indicates:   Allergen Reactions    Nsaids (non-steroidal anti-inflammatory drug) Hives     With ASA and Ibuprofen    Penicillins Hives and Itching       Medications:  Continuous Infusions:   NORepinephrine bitartrate-D5W 0.04 mcg/kg/min (12/13/23 1248)     Scheduled Meds:   cefTRIAXone (ROCEPHIN) IVPB  2 g Intravenous Q24H    chlorproMAZINE  25 mg Intravenous Once    heparin (porcine)  5,000 Units Subcutaneous Q8H    metronidazole  500 mg Intravenous Q8H    pantoprazole  40 mg Intravenous Daily    vancomycin (VANCOCIN) IV (PEDS and ADULTS)  15 mg/kg Intravenous Once     PRN Meds:HYDROmorphone, OLANZapine, ondansetron, sodium chloride 0.9%, Pharmacy to dose Vancomycin consult **AND** vancomycin - pharmacy to dose    Family History    None       Tobacco Use    Smoking status: Never     Passive exposure: Never    Smokeless tobacco: Never   Substance and Sexual Activity    Alcohol use: Not Currently    Drug use: Never    Sexual activity: Not Currently     Objective:     Vital Signs (Most Recent):  Temp: 97.7 °F (36.5 °C) (12/13/23 1100)  Pulse: 87 (12/13/23 1200)  Resp: 16 (12/13/23 1229)  BP: (!) 96/57 (12/13/23 1248)  SpO2: 95 % (12/13/23 1200) Vital Signs (24h Range):  Temp:  [97 °F (36.1 °C)-98.1 °F (36.7 °C)] 97.7 °F (36.5 °C)  Pulse:  [] 87  Resp:  [16-28]  "16  SpO2:  [94 %-100 %] 95 %  BP: ()/(48-67) 96/57     Weight: 108 kg (238 lb 1.6 oz)  Body mass index is 31.41 kg/m².       Physical Exam  Vitals and nursing note reviewed.   Constitutional:       Appearance: He is ill-appearing.      Comments: Older, ill-appearing male lying in bed in no acute distress, awake and conversant; active hiccups but otherwise minimal pain behaviors   HENT:      Mouth/Throat:      Mouth: Mucous membranes are moist.   Eyes:      General: No scleral icterus.     Extraocular Movements: Extraocular movements intact.   Pulmonary:      Effort: Pulmonary effort is normal. No respiratory distress.   Abdominal:      General: Abdomen is flat.   Skin:     General: Skin is warm and dry.   Neurological:      General: No focal deficit present.      Comments: Mildly slurred speech but answers questions appropriately   Psychiatric:         Mood and Affect: Mood normal.         Behavior: Behavior normal.         Thought Content: Thought content normal.         Judgment: Judgment normal.           Advance Care Planning  Advance Directives:     Decision Making:  Patient answered questions  Goals of Care: Engaged patient in voluntary discussion regarding his new metastatic cancer diagnosis. Introduced philosophy of palliative medicine. He demonstrates fair insight noting that "one day you're doing fine, the next you're looking at the end of your life". He understands that any treatments he could potentially receive would not cure his cancer and he offers that he has had many friends and family for whom chemo therapy made them sicker rather than better. I shared that I worried about that possibility for him and he nodded, adding "they say if the cancer dont kill you, the chemo might". He remains hopeful that he can recover from this acute decline and further discuss his options with oncology but also recognizes that he may decline further. He is clear that he "does not want to suffer" should he appear " "to be imminently dying and adds that he would not want to be artificially kept alive in the event his heart stops or he cannot breath on his own, noting "I'm not afraid of dying, it's just shocking how fast this happened". I expressed my shared sorrow for his situation as well as my admiration for his bravery. He references his strong heidi and [employer]oration belief system but declines spiritual support while in the hospital noting "I've got plenty of people supporting and praying for me".       CBC:   Recent Labs   Lab 12/13/23  0320   WBC 18.75*   HGB 9.7*   HCT 29.8*   MCV 84        BMP:  Recent Labs   Lab 12/13/23  0320   *   *   K 5.4*   CL 95   CO2 23   BUN 59*   CREATININE 1.5*   CALCIUM 8.3*   MG 2.5     LFT:  Lab Results   Component Value Date    AST 33 12/13/2023    ALKPHOS 57 12/13/2023    BILITOT 0.3 12/13/2023     Albumin:   Albumin   Date Value Ref Range Status   12/13/2023 2.0 (L) 3.5 - 5.2 g/dL Final     Protein:   Total Protein   Date Value Ref Range Status   12/13/2023 5.4 (L) 6.0 - 8.4 g/dL Final     Lactic acid:   No results found for: "LACTATE"    Reviewed CBC with leukocytosis, anemia; CMP with PERLA; Reviewed PET with extensive metastatic disease      In my care of this patient with acute on chronic severe illness with threat to life and/or bodily function, I am recommending goal-concordant care as noted above. I spent a significant amount of time reviewing external records/ recommendations of other providers (MICU, onc), reviewing recent test results (CBC, CMP, pathology, PET), and discussed care with other subspecialists involved (MICU)    In addition to above, I spent 20 minutes specifically discussing advance care planning and goals of care with patient at bedside.     The above recommendations communicated directly to primary team on 12/13      Rafal Heller MD  Palliative Medicine  [facility] - Cardiac Medical ICU              "

## 2023-12-13 NOTE — EICU
Intervention Initiated From:  [facility] / EICU    Nely intervened regarding:  Time-Out    Called into room for timeout for diagnostic paracentesis per sterile tech per Dr. Hemal Ku in RLQ of abd. 50 ml bloody drainage obtained, and sent off for labs.  Pt tolerated well

## 2023-12-13 NOTE — HPI
Mr. Benjamin is a 58 year old male with ercently diagnosed neuroendocrine gastric cancer with rapid progression including widely metastatic disease (ribs, sacrum, omentum/peritoneal, thoracic and abdominal LN) on recent PET 12/12, who presented the same day from oncology clinic with generalized weakness and malfunctioning PEG tube (recently placed at OSH).  He was found to be hypotensive, tachycardic, tachypneic, with an elevated WBC count and PERLA. Admitted to MICU, started on antibiotics and vasopressors. Palliative care consulted to discuss goals of care and assist with symptom management

## 2023-12-13 NOTE — ASSESSMENT & PLAN NOTE
Placed for dysphagia likely related to GE junction tumor burden. Malfunctioning/clogging on admit possibly related to local disease    -agree with surg onc consult to address, could potentially benefit from relocation of tube to site with less disease activity

## 2023-12-13 NOTE — ASSESSMENT & PLAN NOTE
Patient with PEG tube placed 1 week ago at OSH for dysphagia in setting of stomach/esophageal mass.      -- Reports tube will not flush with leakage at site for several days and he has been taking water po as tube not functioning.   -- Consult GI vs Gen Surg for PEG evaluation with possible exchange  -- NPO pending evaluation  -- Abx to treat potential infection at PEG site.

## 2023-12-13 NOTE — ASSESSMENT & PLAN NOTE
58 year old male with recent diagnosis of now metastatic neuroendocrine carcinoma. Presumed gastric in origin but no distinguishing primary features on path. Admitted for presumed septic shock. Independent prior to this decline but had recent PEG tube due to dysphagia. Planned to initiate carbo/etoposide with Dr Lopez prior to this admit.     -unclear if patient will recover from this acute episode well enough to tolerate chemo  -reasonable to hope for that at this time, but understands he could remain too ill to benefit  -recommend clear and direct communication regarding prognosis and treatment options as course unfolds

## 2023-12-13 NOTE — PHARMACY MED REC
"Admission Medication History     The home medication history was taken by Rama Rosen.    You may go to "Admission" then "Reconcile Home Medications" tabs to review and/or act upon these items.     The home medication list has been updated by the Pharmacy department.   Please read ALL comments highlighted in yellow.   Please address this information as you see fit.    Feel free to contact us if you have any questions or require assistance.      The medications listed below were removed from the home medication list. Please reorder if appropriate:  Patient reports no longer taking the following medication(s):  PANTOPRAZOLE 40 MG TABLET  CLARITHROMYCIN 250 MG/5 ML SUSPENSION  LIKMEZ 500 MG/5 ML SUSPENSION  OMEPRAZOLE 40 MG CAPSULE  OLANZAPINE 5 MG TABLET  ONDANSETRON 4 MG ODT TABLET  LIDOCAINE-PRILOCAINE (EMLA) CREAM  DEXAMETHASONE 4 MG TABLET      Medications listed below were obtained from: Patient/family and Analytic software- [employer]  Current Outpatient Medications on File Prior to Encounter   Medication Sig    oxyCODONE (ROXICODONE) 5 MG immediate release tablet   Take 1 tablet (5 mg total) by mouth every 6 (six) hours as needed for pain.         Potential issues to be addressed PRIOR TO DISCHARGE  Patient requires education regarding drug therapies     Rama Rosen  [id number]                  .          "

## 2023-12-13 NOTE — ASSESSMENT & PLAN NOTE
"Recently discovered mass of stomach/distal esophagus. EGD biopsy diagnosed high grade neuroendocrine tumor.     -- S/p PEG placement approximately 1 week ago at OSH for persistent dysphagia  -- Had PET scan today, demonstrating "Extensive lower thoracic and abdominal lymphadenopathy with smaller lymph nodes of the chest.  There is also extensive peritoneal omental metastases and there are bone metastases."   -- Per record review, saw Dr. Lopez with hematology/oncology today. Plan to start palliative chemotherapy after he recovers from acute illness.   "

## 2023-12-13 NOTE — CONSULTS
[facility] - [facility]  General Surgery  Consult Note    Patient Name: David Benjamin Jr.  MRN: [id number]  Code Status: DNR  Admission Date: 12/12/2023  Hospital Length of Stay: 1 days  Attending Physician: Holger Brasher*  Primary Care Provider: Codi Chavez FNP    Patient information was obtained from patient, past medical records, and ER records.     Inpatient consult to General Surgery  Consult performed by: Lee Talavera MD  Consult ordered by: Hemal Ku MD        Subjective:     Principal Problem: Septic shock    History of Present Illness: David Benjamin Jr. is a 58 y.o. male with PMHx of gastric high-grade neuroendocrine carcinoma who was admitted to the ICU overnight for septic shock. He was recently diagnosed with gastric neuroendocrine carcinoma last month after presenting with dysphagia and subsequent EGD demonstrating this mass. Initial discussions with our surgical oncology team here were to hold of on a feeding tube, however, when patient was discharged he changed his mind and had PEG placement at outside hospital. This was done on 11/29/23. Shortly afterward he presented to outside ED with drainage around the tube site, and the PEG was tightened. We don't have documentation of this, but per the patient and family, it sounds like he then had a buried bumper so his general surgeon replaced the PEG at bedside. He otherwise had tolerated tube feeds, but got full quickly. Currently the PEG will flush but not aspirate.  He had a clinic appointment with medical oncology yesterday and was sent to the ED for further evaluation.   He is currently on Levo 0.04, sating well on room air. Labs demonstrate elevated WBC at 18.6, Cr 1.5, mild electrolyte abnormalities. CT A/P demonstrates a large mass of the distal esophagus, stomach, and mesentery with metastatic disease; PEG is noted to be in appropriate position.       No current facility-administered medications  on file prior to encounter.     Current Outpatient Medications on File Prior to Encounter   Medication Sig    oxyCODONE (ROXICODONE) 5 MG immediate release tablet Take 1 tablet (5 mg total) by mouth every 6 (six) hours as needed for Pain.       Review of patient's allergies indicates:   Allergen Reactions    Nsaids (non-steroidal anti-inflammatory drug) Hives     With ASA and Ibuprofen    Penicillins Hives and Itching       History reviewed. No pertinent past medical history.  Past Surgical History:   Procedure Laterality Date    ESOPHAGOGASTRODUODENOSCOPY N/A 11/21/2023    Procedure: EGD (ESOPHAGOGASTRODUODENOSCOPY);  Surgeon: Princess Voss MD;  Location: [address]);  Service: Gastroenterology;  Laterality: N/A;     Family History    None       Tobacco Use    Smoking status: Never     Passive exposure: Never    Smokeless tobacco: Never   Substance and Sexual Activity    Alcohol use: Not Currently    Drug use: Never    Sexual activity: Not Currently     Review of Systems   Constitutional:  Positive for appetite change. Negative for fever.   Gastrointestinal:  Positive for abdominal pain. Negative for constipation, diarrhea, nausea and vomiting.   Genitourinary:  Negative for difficulty urinating.   Skin:  Positive for wound.   Neurological:  Positive for weakness.     Objective:     Vital Signs (Most Recent):  Temp: 97.7 °F (36.5 °C) (12/13/23 1500)  Pulse: 102 (12/13/23 1600)  Resp: 16 (12/13/23 1600)  BP: (!) 84/57 (12/13/23 1600)  SpO2: 97 % (12/13/23 1600) Vital Signs (24h Range):  Temp:  [97.5 °F (36.4 °C)-98 °F (36.7 °C)] 97.7 °F (36.5 °C)  Pulse:  [] 102  Resp:  [15-28] 16  SpO2:  [94 %-100 %] 97 %  BP: ()/(48-67) 84/57     Weight: 108 kg (238 lb 1.6 oz)  Body mass index is 31.41 kg/m².     Physical Exam  Vitals reviewed.   Constitutional:       General: He is not in acute distress.     Appearance: He is well-developed.   HENT:      Head: Normocephalic and atraumatic.   Eyes:       General:         Right eye: No discharge.         Left eye: No discharge.      Conjunctiva/sclera: Conjunctivae normal.   Cardiovascular:      Rate and Rhythm: Normal rate and regular rhythm.      Comments: R IJV port, well-healing, overlying steri strips  Pulmonary:      Effort: Pulmonary effort is normal. No respiratory distress.   Abdominal:      General: There is distension.      Palpations: Abdomen is soft.      Tenderness: There is abdominal tenderness (mild, around PEG site). There is no guarding or rebound.      Comments: PEG site wound, larger than tube diameter, drainage of thin white material   Musculoskeletal:         General: No deformity.   Skin:     General: Skin is warm and dry.   Neurological:      Mental Status: He is alert and oriented to person, place, and time.   Psychiatric:         Behavior: Behavior normal.            I have reviewed all pertinent lab results within the past 24 hours.  CBC:   Recent Labs   Lab 12/13/23  0320   WBC 18.75*   RBC 3.55*   HGB 9.7*   HCT 29.8*      MCV 84   MCH 27.3   MCHC 32.6     CMP:   Recent Labs   Lab 12/13/23  0320   *   CALCIUM 8.3*   ALBUMIN 2.0*   PROT 5.4*   *   K 5.4*   CO2 23   CL 95   BUN 59*   CREATININE 1.5*   ALKPHOS 57   ALT 11   AST 33   BILITOT 0.3       Significant Diagnostics:  I have reviewed all pertinent imaging results/findings within the past 24 hours.    Imaging Results              CT Abdomen Pelvis With IV Contrast NO Oral Contrast (Final result)  Result time 12/12/23 20:00:13      Final result by Amilcar Robertson MD (12/12/23 20:00:13)                   Impression:      1. No significant change from the PET-CT scan few hours prior.  2. Large mass involving the distal esophagus, stomach and adjacent mesentery with omental and peritoneal metastatic disease.  Metastatic disease noted to the retroperitoneum, pleura, mediastinum and bones.  3. Peg tube appears normally positioned.  4. Stable bibasilar pleural effusions  left greater than right.  5. Stable moderate diffuse ascites      Electronically signed by: Amilcar Preston  Date:    12/12/2023  Time:    20:00               Narrative:    EXAMINATION:  CT ABDOMEN PELVIS WITH IV CONTRAST    CLINICAL HISTORY:  Abdominal pain, post-op;Abdominal abscess/infection suspected;    TECHNIQUE:  Low dose axial images, sagittal and coronal reformations were obtained from the lung bases to the pubic symphysis following the IV administration of 100 mL of Omnipaque 350 .  Oral contrast was not administered.    COMPARISON:  11/20/2023, 12/12/2023    FINDINGS:  Additional significant history of known metastatic disease.    Abdomen:    - Lower thorax:Pleural based and mediastinal based metastatic disease in the lower thorax adjacent to the heart, right lung base and right anterior chest wall.    - Lung bases: Small left basilar pleural effusion.  Trace right pleural effusion also.    - Liver: Stable small hypodense focus in the right hepatic lobe on axial 39 likely a small bilobular cyst.    - Gallbladder: No calcified gallstones.    - Bile Ducts: No evidence of intra or extra hepatic biliary ductal dilation.    - Spleen: Negative.    - Kidneys: Probable small subcentimeter cysts in the left kidney.  Probable small subcentimeter cyst on the right also.  No stone, soft tissue mass or hydronephrosis.    - Adrenals: Unremarkable.    - Pancreas: No mass or peripancreatic fat stranding.    - Retroperitoneum:  Bulky periaortic retroperitoneal adenopathy.    - Vascular: No abdominal aortic aneurysm.    - Abdominal wall:  Unremarkable.    Pelvis:    Urinary bladder is within normal limits.    Moderate ascites in the abdomen and pelvis.    Bowel/Mesentery:    Diverticulosis of the left colon.  No evidence of acute diverticulitis.  Retained bowel contrast in the colon and rectum.    No evidence of bowel obstruction.    Bones:  No acute osseous abnormality and no suspicious lytic or blastic  lesion.    Circumferential wall thickening/mass associated with the distal esophagus extending to a large mass adjacent to the stomach with involvement of the lesser curvature.  The no change from the PET-CT 5 hours prior.    Peg tube present anteriorly near the midline.  Positioning appears adequate.    Omental and peritoneal metastatic disease.    Osseous metastatic disease better characterized on prior PET-CT.    No abscess is identified.    Laminectomy defect in the lumbar spine with postoperative changes.                                       X-Ray Chest AP Portable (Final result)  Result time 12/12/23 18:10:54      Final result by Everardo Lieberman MD (12/12/23 18:10:54)                   Impression:      As above.      Electronically signed by: Everardo Lieberman MD  Date:    12/12/2023  Time:    18:10               Narrative:    EXAMINATION:  XR CHEST AP PORTABLE    CLINICAL HISTORY:  Sepsis;    TECHNIQUE:  Single frontal view of the chest was performed.    COMPARISON:  PET CT same date    FINDINGS:  Right chest vascular port tip overlies the proximal SVC.Cardiomediastinal silhouette is midline and within normal limits for age.  Pulmonary vasculature and hilar contours are within normal limits.    Few scattered linear opacities throughout the lungs consistent with minimal platelike scarring versus atelectasis.  Subtle hazy opacification of the left lung base with slight blunting of the costophrenic angle suggesting small left pleural effusion better demonstrated on PET CT performed earlier same day.  The trace right pleural effusion is not well demonstrated on current radiograph.  No consolidation or pneumothorax.    No acute osseous process seen.  PA and lateral views can be obtained.                                        Assessment/Plan:     PEG tube malfunction  58 y.o. male with PMHx of gastric high-grade neuroendocrine carcinoma who was admitted to the ICU overnight for septic shock. General surgery was  consulted for evaluation of PEG.     - CT demonstrates interval growth of gastric mass. PEG appears in appropriate position but gastric lumen is compressed. Suspect issues with PEG aspiration 2/2 position against posterior wall of the stomach. Would not use PEG for tube feeds. Should remain in place though until at least 6 weeks post op from placement on 11/29/23.   - Continue dressing the PEG site, the tract was likely dilated during the removal/exchange process since it was placed creating a larger wound with more drainage expected  - Remainder of care per MICU  - Please call with questions      VTE Risk Mitigation (From admission, onward)           Ordered     heparin (porcine) injection 5,000 Units  Every 8 hours         12/12/23 2339     IP VTE HIGH RISK PATIENT  Once         12/12/23 2339     Place sequential compression device  Until discontinued         12/12/23 2339                      Lee Talavera MD  General Surgery  [facility] - Cardiac Medical ICU

## 2023-12-13 NOTE — SUBJECTIVE & OBJECTIVE
Interval History: Chart reviewed including 24h medication use. Patient resting comfortably in bed. Briefly spoke with wife in hallway and re-introduced palliative medicine. She noted she needed to go eat and take a break, provided my condolences. Patient awake and interactive although somewhat somnolent.     Pain meds past 24 hours: fentanyl 50mcg IVP x1  (~15 OME), morphine 6mg IV x1 (18 OME) 2mg x1 (6 OME), hydromorphone 0.2mg IV x2 (8 OME); total OME ~47    Palliative ROS:  Pain + (relieved by dilaudid 0.2mg)  Dyspnea -  Nausea +  Vomiting -  Constipation -  Anxiety -  Anorexia -  Hiccups +        History reviewed. No pertinent past medical history.    Past Surgical History:   Procedure Laterality Date    ESOPHAGOGASTRODUODENOSCOPY N/A 11/21/2023    Procedure: EGD (ESOPHAGOGASTRODUODENOSCOPY);  Surgeon: Princess Voss MD;  Location: [address]);  Service: Gastroenterology;  Laterality: N/A;       Review of patient's allergies indicates:   Allergen Reactions    Nsaids (non-steroidal anti-inflammatory drug) Hives     With ASA and Ibuprofen    Penicillins Hives and Itching       Medications:  Continuous Infusions:   NORepinephrine bitartrate-D5W 0.04 mcg/kg/min (12/13/23 1248)     Scheduled Meds:   cefTRIAXone (ROCEPHIN) IVPB  2 g Intravenous Q24H    chlorproMAZINE  25 mg Intravenous Once    heparin (porcine)  5,000 Units Subcutaneous Q8H    metronidazole  500 mg Intravenous Q8H    pantoprazole  40 mg Intravenous Daily    vancomycin (VANCOCIN) IV (PEDS and ADULTS)  15 mg/kg Intravenous Once     PRN Meds:HYDROmorphone, OLANZapine, ondansetron, sodium chloride 0.9%, Pharmacy to dose Vancomycin consult **AND** vancomycin - pharmacy to dose    Family History    None       Tobacco Use    Smoking status: Never     Passive exposure: Never    Smokeless tobacco: Never   Substance and Sexual Activity    Alcohol use: Not Currently    Drug use: Never    Sexual activity: Not Currently     Objective:     Vital Signs  "(Most Recent):  Temp: 97.7 °F (36.5 °C) (12/13/23 1100)  Pulse: 87 (12/13/23 1200)  Resp: 16 (12/13/23 1229)  BP: (!) 96/57 (12/13/23 1248)  SpO2: 95 % (12/13/23 1200) Vital Signs (24h Range):  Temp:  [97 °F (36.1 °C)-98.1 °F (36.7 °C)] 97.7 °F (36.5 °C)  Pulse:  [] 87  Resp:  [16-28] 16  SpO2:  [94 %-100 %] 95 %  BP: ()/(48-67) 96/57     Weight: 108 kg (238 lb 1.6 oz)  Body mass index is 31.41 kg/m².       Physical Exam  Vitals and nursing note reviewed.   Constitutional:       Appearance: He is ill-appearing.      Comments: Older, ill-appearing male lying in bed in no acute distress, awake and conversant; active hiccups but otherwise minimal pain behaviors   HENT:      Mouth/Throat:      Mouth: Mucous membranes are moist.   Eyes:      General: No scleral icterus.     Extraocular Movements: Extraocular movements intact.   Pulmonary:      Effort: Pulmonary effort is normal. No respiratory distress.   Abdominal:      General: Abdomen is flat.   Skin:     General: Skin is warm and dry.   Neurological:      General: No focal deficit present.      Comments: Mildly slurred speech but answers questions appropriately   Psychiatric:         Mood and Affect: Mood normal.         Behavior: Behavior normal.         Thought Content: Thought content normal.         Judgment: Judgment normal.           Advance Care Planning   Advance Directives:     Decision Making:  Patient answered questions  Goals of Care: Engaged patient in voluntary discussion regarding his new metastatic cancer diagnosis. Introduced philosophy of palliative medicine. He demonstrates fair insight noting that "one day you're doing fine, the next you're looking at the end of your life". He understands that any treatments he could potentially receive would not cure his cancer and he offers that he has had many friends and family for whom chemo therapy made them sicker rather than better. I shared that I worried about that possibility for him and he " "nodded, adding "they say if the cancer dont kill you, the chemo might". He remains hopeful that he can recover from this acute decline and further discuss his options with oncology but also recognizes that he may decline further. He is clear that he "does not want to suffer" should he appear to be imminently dying and adds that he would not want to be artificially kept alive in the event his heart stops or he cannot breath on his own, noting "I'm not afraid of dying, it's just shocking how fast this happened". I expressed my shared sorrow for his situation as well as my admiration for his bravery. He references his strong heidi and Restorationist belief system but declines spiritual support while in the hospital noting "I've got plenty of people supporting and praying for me".       CBC:   Recent Labs   Lab 12/13/23  0320   WBC 18.75*   HGB 9.7*   HCT 29.8*   MCV 84        BMP:  Recent Labs   Lab 12/13/23  0320   *   *   K 5.4*   CL 95   CO2 23   BUN 59*   CREATININE 1.5*   CALCIUM 8.3*   MG 2.5     LFT:  Lab Results   Component Value Date    AST 33 12/13/2023    ALKPHOS 57 12/13/2023    BILITOT 0.3 12/13/2023     Albumin:   Albumin   Date Value Ref Range Status   12/13/2023 2.0 (L) 3.5 - 5.2 g/dL Final     Protein:   Total Protein   Date Value Ref Range Status   12/13/2023 5.4 (L) 6.0 - 8.4 g/dL Final     Lactic acid:   No results found for: "LACTATE"    Reviewed CBC with leukocytosis, anemia; CMP with PERLA; Reviewed PET with extensive metastatic disease    "

## 2023-12-14 PROBLEM — G89.3 CANCER RELATED PAIN: Status: ACTIVE | Noted: 2023-12-14

## 2023-12-14 PROBLEM — R06.6 HICCUPS: Status: ACTIVE | Noted: 2023-12-14

## 2023-12-14 LAB
ALBUMIN SERPL BCP-MCNC: 1.8 G/DL (ref 3.5–5.2)
ALP SERPL-CCNC: 55 U/L (ref 55–135)
ALT SERPL W/O P-5'-P-CCNC: 9 U/L (ref 10–44)
ANION GAP SERPL CALC-SCNC: 12 MMOL/L (ref 8–16)
AST SERPL-CCNC: 37 U/L (ref 10–40)
BASOPHILS # BLD AUTO: 0.02 K/UL (ref 0–0.2)
BASOPHILS NFR BLD: 0.1 % (ref 0–1.9)
BILIRUB SERPL-MCNC: 0.2 MG/DL (ref 0.1–1)
BUN SERPL-MCNC: 51 MG/DL (ref 6–20)
CALCIUM SERPL-MCNC: 8 MG/DL (ref 8.7–10.5)
CHLORIDE SERPL-SCNC: 98 MMOL/L (ref 95–110)
CO2 SERPL-SCNC: 19 MMOL/L (ref 23–29)
CREAT SERPL-MCNC: 1.3 MG/DL (ref 0.5–1.4)
DIFFERENTIAL METHOD: ABNORMAL
EOSINOPHIL # BLD AUTO: 0 K/UL (ref 0–0.5)
EOSINOPHIL NFR BLD: 0.1 % (ref 0–8)
ERYTHROCYTE [DISTWIDTH] IN BLOOD BY AUTOMATED COUNT: 14.3 % (ref 11.5–14.5)
EST. GFR  (NO RACE VARIABLE): >60 ML/MIN/1.73 M^2
GLUCOSE SERPL-MCNC: 116 MG/DL (ref 70–110)
HCT VFR BLD AUTO: 27.5 % (ref 40–54)
HGB BLD-MCNC: 9.2 G/DL (ref 14–18)
IMM GRANULOCYTES # BLD AUTO: 0.22 K/UL (ref 0–0.04)
IMM GRANULOCYTES NFR BLD AUTO: 1.5 % (ref 0–0.5)
LYMPHOCYTES # BLD AUTO: 1.1 K/UL (ref 1–4.8)
LYMPHOCYTES NFR BLD: 7.4 % (ref 18–48)
MAGNESIUM SERPL-MCNC: 2.5 MG/DL (ref 1.6–2.6)
MCH RBC QN AUTO: 28 PG (ref 27–31)
MCHC RBC AUTO-ENTMCNC: 33.5 G/DL (ref 32–36)
MCV RBC AUTO: 84 FL (ref 82–98)
MONOCYTES # BLD AUTO: 0.9 K/UL (ref 0.3–1)
MONOCYTES NFR BLD: 6.3 % (ref 4–15)
NEUTROPHILS # BLD AUTO: 12.3 K/UL (ref 1.8–7.7)
NEUTROPHILS NFR BLD: 84.6 % (ref 38–73)
NRBC BLD-RTO: 0 /100 WBC
PHOSPHATE SERPL-MCNC: 4.1 MG/DL (ref 2.7–4.5)
PLATELET # BLD AUTO: 277 K/UL (ref 150–450)
PMV BLD AUTO: 10.7 FL (ref 9.2–12.9)
POTASSIUM SERPL-SCNC: 5 MMOL/L (ref 3.5–5.1)
PROT SERPL-MCNC: 4.9 G/DL (ref 6–8.4)
RBC # BLD AUTO: 3.29 M/UL (ref 4.6–6.2)
SODIUM SERPL-SCNC: 129 MMOL/L (ref 136–145)
VANCOMYCIN SERPL-MCNC: 8.9 UG/ML
WBC # BLD AUTO: 14.5 K/UL (ref 3.9–12.7)

## 2023-12-14 PROCEDURE — 99223 1ST HOSP IP/OBS HIGH 75: CPT | Mod: ,,, | Performed by: HOSPITALIST

## 2023-12-14 PROCEDURE — 25000003 PHARM REV CODE 250

## 2023-12-14 PROCEDURE — 99291 PR CRITICAL CARE, E/M 30-74 MINUTES: ICD-10-PCS | Mod: ,,, | Performed by: INTERNAL MEDICINE

## 2023-12-14 PROCEDURE — 63600175 PHARM REV CODE 636 W HCPCS: Performed by: NURSE PRACTITIONER

## 2023-12-14 PROCEDURE — 85025 COMPLETE CBC W/AUTO DIFF WBC: CPT | Performed by: NURSE PRACTITIONER

## 2023-12-14 PROCEDURE — 25000003 PHARM REV CODE 250: Performed by: INTERNAL MEDICINE

## 2023-12-14 PROCEDURE — C9113 INJ PANTOPRAZOLE SODIUM, VIA: HCPCS

## 2023-12-14 PROCEDURE — 63600175 PHARM REV CODE 636 W HCPCS: Performed by: INTERNAL MEDICINE

## 2023-12-14 PROCEDURE — 99233 SBSQ HOSP IP/OBS HIGH 50: CPT | Mod: ,,, | Performed by: STUDENT IN AN ORGANIZED HEALTH CARE EDUCATION/TRAINING PROGRAM

## 2023-12-14 PROCEDURE — 99223 PR INITIAL HOSPITAL CARE,LEVL III: ICD-10-PCS | Mod: ,,, | Performed by: HOSPITALIST

## 2023-12-14 PROCEDURE — 99233 PR SUBSEQUENT HOSPITAL CARE,LEVL III: ICD-10-PCS | Mod: ,,, | Performed by: STUDENT IN AN ORGANIZED HEALTH CARE EDUCATION/TRAINING PROGRAM

## 2023-12-14 PROCEDURE — 99291 CRITICAL CARE FIRST HOUR: CPT | Mod: ,,, | Performed by: INTERNAL MEDICINE

## 2023-12-14 PROCEDURE — 20000000 HC ICU ROOM

## 2023-12-14 PROCEDURE — 25000242 PHARM REV CODE 250 ALT 637 W/ HCPCS

## 2023-12-14 PROCEDURE — 25000003 PHARM REV CODE 250: Performed by: NURSE PRACTITIONER

## 2023-12-14 PROCEDURE — 83735 ASSAY OF MAGNESIUM: CPT | Performed by: NURSE PRACTITIONER

## 2023-12-14 PROCEDURE — 84100 ASSAY OF PHOSPHORUS: CPT | Performed by: NURSE PRACTITIONER

## 2023-12-14 PROCEDURE — 63600175 PHARM REV CODE 636 W HCPCS

## 2023-12-14 PROCEDURE — 80053 COMPREHEN METABOLIC PANEL: CPT | Performed by: NURSE PRACTITIONER

## 2023-12-14 PROCEDURE — 80202 ASSAY OF VANCOMYCIN: CPT | Performed by: INTERNAL MEDICINE

## 2023-12-14 RX ORDER — OXYCODONE HCL 5 MG/5 ML
5 SOLUTION, ORAL ORAL
Status: DISCONTINUED | OUTPATIENT
Start: 2023-12-14 | End: 2023-12-15

## 2023-12-14 RX ORDER — FENTANYL 12.5 UG/1
1 PATCH TRANSDERMAL
Status: DISCONTINUED | OUTPATIENT
Start: 2023-12-14 | End: 2023-12-15

## 2023-12-14 RX ORDER — METRONIDAZOLE 500 MG/100ML
500 INJECTION, SOLUTION INTRAVENOUS
Status: DISCONTINUED | OUTPATIENT
Start: 2023-12-14 | End: 2023-12-19 | Stop reason: HOSPADM

## 2023-12-14 RX ORDER — HYDROMORPHONE HYDROCHLORIDE 1 MG/ML
1 INJECTION, SOLUTION INTRAMUSCULAR; INTRAVENOUS; SUBCUTANEOUS ONCE
Status: COMPLETED | OUTPATIENT
Start: 2023-12-14 | End: 2023-12-14

## 2023-12-14 RX ORDER — SODIUM CHLORIDE, SODIUM LACTATE, POTASSIUM CHLORIDE, CALCIUM CHLORIDE 600; 310; 30; 20 MG/100ML; MG/100ML; MG/100ML; MG/100ML
INJECTION, SOLUTION INTRAVENOUS CONTINUOUS
Status: ACTIVE | OUTPATIENT
Start: 2023-12-14 | End: 2023-12-15

## 2023-12-14 RX ORDER — NOREPINEPHRINE BITARTRATE/D5W 4MG/250ML
0-.2 PLASTIC BAG, INJECTION (ML) INTRAVENOUS CONTINUOUS
Status: DISPENSED | OUTPATIENT
Start: 2023-12-14 | End: 2023-12-15

## 2023-12-14 RX ORDER — OXYCODONE HCL 5 MG/5 ML
5 SOLUTION, ORAL ORAL EVERY 4 HOURS PRN
Status: DISCONTINUED | OUTPATIENT
Start: 2023-12-14 | End: 2023-12-14

## 2023-12-14 RX ORDER — LIDOCAINE 50 MG/G
1 PATCH TOPICAL DAILY PRN
Status: DISCONTINUED | OUTPATIENT
Start: 2023-12-14 | End: 2023-12-19 | Stop reason: HOSPADM

## 2023-12-14 RX ORDER — HYDROMORPHONE HYDROCHLORIDE 1 MG/ML
0.5 INJECTION, SOLUTION INTRAMUSCULAR; INTRAVENOUS; SUBCUTANEOUS EVERY 4 HOURS PRN
Status: DISCONTINUED | OUTPATIENT
Start: 2023-12-14 | End: 2023-12-15

## 2023-12-14 RX ORDER — HYDROMORPHONE HYDROCHLORIDE 1 MG/ML
0.5 INJECTION, SOLUTION INTRAMUSCULAR; INTRAVENOUS; SUBCUTANEOUS EVERY 6 HOURS PRN
Status: DISCONTINUED | OUTPATIENT
Start: 2023-12-14 | End: 2023-12-14

## 2023-12-14 RX ORDER — HYDROMORPHONE HYDROCHLORIDE 1 MG/ML
0.2 INJECTION, SOLUTION INTRAMUSCULAR; INTRAVENOUS; SUBCUTANEOUS
Status: DISCONTINUED | OUTPATIENT
Start: 2023-12-14 | End: 2023-12-14

## 2023-12-14 RX ORDER — SODIUM CHLORIDE, SODIUM LACTATE, POTASSIUM CHLORIDE, CALCIUM CHLORIDE 600; 310; 30; 20 MG/100ML; MG/100ML; MG/100ML; MG/100ML
INJECTION, SOLUTION INTRAVENOUS CONTINUOUS
Status: ACTIVE | OUTPATIENT
Start: 2023-12-14 | End: 2023-12-14

## 2023-12-14 RX ORDER — CHLORPROMAZINE HYDROCHLORIDE 25 MG/ML
25 INJECTION INTRAMUSCULAR 3 TIMES DAILY PRN
Status: DISCONTINUED | OUTPATIENT
Start: 2023-12-14 | End: 2023-12-15

## 2023-12-14 RX ADMIN — HYDROMORPHONE HYDROCHLORIDE 0.5 MG: 0.5 INJECTION, SOLUTION INTRAMUSCULAR; INTRAVENOUS; SUBCUTANEOUS at 11:12

## 2023-12-14 RX ADMIN — FENTANYL 1 PATCH: 12 PATCH TRANSDERMAL at 12:12

## 2023-12-14 RX ADMIN — SODIUM CHLORIDE, POTASSIUM CHLORIDE, SODIUM LACTATE AND CALCIUM CHLORIDE: 600; 310; 30; 20 INJECTION, SOLUTION INTRAVENOUS at 07:12

## 2023-12-14 RX ADMIN — HYDROMORPHONE HYDROCHLORIDE 0.2 MG: 0.5 INJECTION, SOLUTION INTRAMUSCULAR; INTRAVENOUS; SUBCUTANEOUS at 11:12

## 2023-12-14 RX ADMIN — OXYCODONE HYDROCHLORIDE 5 MG: 5 SOLUTION ORAL at 01:12

## 2023-12-14 RX ADMIN — NOREPINEPHRINE BITARTRATE 0.02 MCG/KG/MIN: 4 INJECTION, SOLUTION INTRAVENOUS at 05:12

## 2023-12-14 RX ADMIN — OXYCODONE HYDROCHLORIDE 5 MG: 5 SOLUTION ORAL at 08:12

## 2023-12-14 RX ADMIN — SODIUM CHLORIDE, POTASSIUM CHLORIDE, SODIUM LACTATE AND CALCIUM CHLORIDE: 600; 310; 30; 20 INJECTION, SOLUTION INTRAVENOUS at 05:12

## 2023-12-14 RX ADMIN — VANCOMYCIN HYDROCHLORIDE 1500 MG: 1.5 INJECTION, POWDER, LYOPHILIZED, FOR SOLUTION INTRAVENOUS at 09:12

## 2023-12-14 RX ADMIN — METRONIDAZOLE 500 MG: 500 INJECTION, SOLUTION INTRAVENOUS at 02:12

## 2023-12-14 RX ADMIN — HEPARIN SODIUM 5000 UNITS: 5000 INJECTION INTRAVENOUS; SUBCUTANEOUS at 05:12

## 2023-12-14 RX ADMIN — HEPARIN SODIUM 5000 UNITS: 5000 INJECTION INTRAVENOUS; SUBCUTANEOUS at 02:12

## 2023-12-14 RX ADMIN — HYDROMORPHONE HYDROCHLORIDE 0.2 MG: 0.5 INJECTION, SOLUTION INTRAMUSCULAR; INTRAVENOUS; SUBCUTANEOUS at 06:12

## 2023-12-14 RX ADMIN — HYDROMORPHONE HYDROCHLORIDE 0.5 MG: 0.5 INJECTION, SOLUTION INTRAMUSCULAR; INTRAVENOUS; SUBCUTANEOUS at 06:12

## 2023-12-14 RX ADMIN — CEFTRIAXONE 2 G: 2 INJECTION, POWDER, FOR SOLUTION INTRAMUSCULAR; INTRAVENOUS at 05:12

## 2023-12-14 RX ADMIN — HYDROMORPHONE HYDROCHLORIDE 0.5 MG: 0.5 INJECTION, SOLUTION INTRAMUSCULAR; INTRAVENOUS; SUBCUTANEOUS at 02:12

## 2023-12-14 RX ADMIN — NOREPINEPHRINE BITARTRATE 0.1 MCG/KG/MIN: 4 INJECTION, SOLUTION INTRAVENOUS at 10:12

## 2023-12-14 RX ADMIN — SODIUM CHLORIDE, POTASSIUM CHLORIDE, SODIUM LACTATE AND CALCIUM CHLORIDE: 600; 310; 30; 20 INJECTION, SOLUTION INTRAVENOUS at 10:12

## 2023-12-14 RX ADMIN — METRONIDAZOLE 500 MG: 5 INJECTION, SOLUTION INTRAVENOUS at 08:12

## 2023-12-14 RX ADMIN — HYDROMORPHONE HYDROCHLORIDE 1 MG: 1 INJECTION, SOLUTION INTRAMUSCULAR; INTRAVENOUS; SUBCUTANEOUS at 08:12

## 2023-12-14 RX ADMIN — OXYCODONE HYDROCHLORIDE 5 MG: 5 SOLUTION ORAL at 05:12

## 2023-12-14 RX ADMIN — METRONIDAZOLE 500 MG: 500 INJECTION, SOLUTION INTRAVENOUS at 08:12

## 2023-12-14 RX ADMIN — CHLORPROMAZINE HYDROCHLORIDE 25 MG: 25 INJECTION INTRAMUSCULAR at 05:12

## 2023-12-14 RX ADMIN — HEPARIN SODIUM 5000 UNITS: 5000 INJECTION INTRAVENOUS; SUBCUTANEOUS at 10:12

## 2023-12-14 RX ADMIN — HYDROMORPHONE HYDROCHLORIDE 0.2 MG: 0.5 INJECTION, SOLUTION INTRAMUSCULAR; INTRAVENOUS; SUBCUTANEOUS at 04:12

## 2023-12-14 RX ADMIN — PANTOPRAZOLE SODIUM 40 MG: 40 INJECTION, POWDER, FOR SOLUTION INTRAVENOUS at 08:12

## 2023-12-14 RX ADMIN — HYDROMORPHONE HYDROCHLORIDE 0.5 MG: 0.5 INJECTION, SOLUTION INTRAMUSCULAR; INTRAVENOUS; SUBCUTANEOUS at 12:12

## 2023-12-14 NOTE — HPI
58-year-old male medical history significant for gastric neuroendocrine tumor, presents feeling evaluation of the oncology clinic.  He was noted to have PERLA on further referred to the ER.    We have been consulted in light of his oncological history.    Of note, patient did have a recent PET/CT study which was concerning for disease progression.  Discussed with outpatient oncologist, Dr. Lopez who adviced patient to get better and stronger before going forward with cancer directed care.   Presentation to the ER, to be hypotensive requiring initiation of vasopressors in the ER.  Laboratory investigation was significant for leukocytosis, anemia, PERLA and hyponatremia.  Chest x-ray showed cardiopulmonary abnormality.  CT C/A/P revealed a large mass in distal esophagus, stomach and adjacent mesentery  with omental and peritoneal metastatic disease. Metastatic disease noted to the retroperitoneum. Peg tube appears positioned.  The presence of stable bibasilar pleural effusions. Moderate diffuse ascites

## 2023-12-14 NOTE — ASSESSMENT & PLAN NOTE
Patient with PEG tube placed 1 week ago at OSH for dysphagia in setting of stomach/esophageal mass. PEG tube flushed successfully with minimal volume, tube aspiration appreciated. Consult GI vs Gen Surg, appreciate recs. PEG tube correctly placed but balloon is compressed against posterior wall of the stomach. Pt recommended to discontinue use. PEG tube exchange will not improve functionality. Pt advanced to full liquid diet and tolerating well.  -- Continue full liquid diet as tolerated

## 2023-12-14 NOTE — SUBJECTIVE & OBJECTIVE
Interval History/Significant Events: No acute events overnight. Pt tolerating full liquid diet without difficulty. Vasopressors weaned as tolerated.    Review of Systems   Constitutional:  Positive for activity change, fatigue and unexpected weight change.   HENT:  Negative for sore throat.    Eyes:  Negative for visual disturbance.   Respiratory: Negative.     Cardiovascular: Negative.    Gastrointestinal:  Positive for abdominal distention and abdominal pain. Negative for constipation, diarrhea, nausea and vomiting.   Endocrine: Negative for polyuria.   Genitourinary: Negative.    Musculoskeletal: Negative.  Negative for arthralgias.   Skin:  Negative for color change.   Neurological:  Negative for weakness, light-headedness and headaches.     Objective:     Vital Signs (Most Recent):  Temp: 97.3 °F (36.3 °C) (12/14/23 1500)  Pulse: 94 (12/14/23 1600)  Resp: (Abnormal) 24 (12/14/23 1600)  BP: (Abnormal) 99/57 (12/14/23 1600)  SpO2: 95 % (12/14/23 1600) Vital Signs (24h Range):  Temp:  [97.2 °F (36.2 °C)-98.3 °F (36.8 °C)] 97.3 °F (36.3 °C)  Pulse:  [] 94  Resp:  [14-28] 24  SpO2:  [92 %-100 %] 95 %  BP: ()/(50-62) 99/57   Weight: 108 kg (238 lb 1.6 oz)  Body mass index is 31.41 kg/m².      Intake/Output Summary (Last 24 hours) at 12/14/2023 1653  Last data filed at 12/14/2023 1600  Gross per 24 hour   Intake 3533.77 ml   Output 870 ml   Net 2663.77 ml          Physical Exam  Vitals reviewed.   Constitutional:       General: He is not in acute distress.     Appearance: He is ill-appearing. He is not toxic-appearing.   HENT:      Nose: Nose normal.      Mouth/Throat:      Mouth: Mucous membranes are moist.      Pharynx: Oropharynx is clear.   Eyes:      Extraocular Movements: Extraocular movements intact.      Conjunctiva/sclera: Conjunctivae normal.   Cardiovascular:      Rate and Rhythm: Normal rate and regular rhythm.      Pulses:           Radial pulses are 2+ on the right side and 2+ on the left  side.        Dorsalis pedis pulses are 2+ on the right side and 2+ on the left side.      Heart sounds: No murmur heard.  Pulmonary:      Effort: Pulmonary effort is normal. No respiratory distress.      Breath sounds: No wheezing.   Abdominal:      General: Bowel sounds are normal. There is distension.      Tenderness: There is generalized abdominal tenderness. There is no right CVA tenderness or guarding. Negative signs include Sanford's sign.      Comments: PEG tube in LUQ   Musculoskeletal:      Cervical back: No tenderness.      Right lower leg: No edema.      Left lower leg: No edema.   Skin:     Capillary Refill: Capillary refill takes less than 2 seconds.      Coloration: Skin is not jaundiced or pale.   Neurological:      Mental Status: He is alert and oriented to person, place, and time. Mental status is at baseline.            Vents:     Lines/Drains/Airways       Drain       Name Duration         Gastrostomy/Enterostomy 12/12/23 2053 LUQ 1 day              Peripheral Intravenous Line       Name Duration         Peripheral IV - Single Lumen 12/13/23 0945 16 G Anterior;Distal;Left Antecubital 1 day         Peripheral IV - Single Lumen 12/13/23 2112 18 G Anterior;Left Upper Arm <1 day                  Significant Labs:    CBC/Anemia Profile:  Recent Labs   Lab 12/13/23  0320 12/14/23  0303   WBC 18.75* 14.50*   HGB 9.7* 9.2*   HCT 29.8* 27.5*    277   MCV 84 84   RDW 14.6* 14.3        Chemistries:  Recent Labs   Lab 12/13/23  0320 12/14/23  0303   * 129*   K 5.4* 5.0   CL 95 98   CO2 23 19*   BUN 59* 51*   CREATININE 1.5* 1.3   CALCIUM 8.3* 8.0*   ALBUMIN 2.0* 1.8*   PROT 5.4* 4.9*   BILITOT 0.3 0.2   ALKPHOS 57 55   ALT 11 9*   AST 33 37   MG 2.5 2.5   PHOS 4.7* 4.1       All pertinent labs within the past 24 hours have been reviewed.    Significant Imaging:  I have reviewed all pertinent imaging results/findings within the past 24 hours.

## 2023-12-14 NOTE — SUBJECTIVE & OBJECTIVE
Oncology Treatment Plan:   OP CARBOPLATIN (AUC) + ETOPOSIDE Q3W    Medications:  Continuous Infusions:   lactated ringers 125 mL/hr at 12/14/23 1710    NORepinephrine bitartrate-D5W 0.04 mcg/kg/min (12/14/23 1700)     Scheduled Meds:   cefTRIAXone (ROCEPHIN) IVPB  2 g Intravenous Q24H    fentaNYL  1 patch Transdermal Q72H    heparin (porcine)  5,000 Units Subcutaneous Q8H    metronidazole  500 mg Intravenous Q12H    pantoprazole  40 mg Intravenous Daily    vancomycin (VANCOCIN) IV (PEDS and ADULTS)  15 mg/kg Intravenous Once    vancomycin (VANCOCIN) IV (PEDS and ADULTS)  1,500 mg Intravenous Q24H     PRN Meds:chlorproMAZINE, HYDROmorphone, LIDOcaine, OLANZapine, ondansetron, oxyCODONE, sodium chloride 0.9%, Pharmacy to dose Vancomycin consult **AND** vancomycin - pharmacy to dose     Review of patient's allergies indicates:   Allergen Reactions    Nsaids (non-steroidal anti-inflammatory drug) Hives     With ASA and Ibuprofen    Penicillins Hives and Itching        History reviewed. No pertinent past medical history.  Past Surgical History:   Procedure Laterality Date    ESOPHAGOGASTRODUODENOSCOPY N/A 11/21/2023    Procedure: EGD (ESOPHAGOGASTRODUODENOSCOPY);  Surgeon: Princess Voss MD;  Location: [address];  Service: Gastroenterology;  Laterality: N/A;     Family History    None       Tobacco Use    Smoking status: Never     Passive exposure: Never    Smokeless tobacco: Never   Substance and Sexual Activity    Alcohol use: Not Currently    Drug use: Never    Sexual activity: Not Currently       Review of Systems   Constitutional:  Positive for activity change and fatigue.   Respiratory: Negative.     Cardiovascular: Negative.    Gastrointestinal:  Positive for abdominal pain. Negative for diarrhea.   Genitourinary: Negative.    Musculoskeletal: Negative.    Neurological:  Negative for weakness.     Objective:     Vital Signs (Most Recent):  Temp: 97.3 °F (36.3 °C) (12/14/23 1500)  Pulse: 92 (12/14/23  1700)  Resp: 13 (12/14/23 1700)  BP: (!) 92/55 (12/14/23 1700)  SpO2: 96 % (12/14/23 1700) Vital Signs (24h Range):  Temp:  [97.2 °F (36.2 °C)-98.3 °F (36.8 °C)] 97.3 °F (36.3 °C)  Pulse:  [] 92  Resp:  [13-28] 13  SpO2:  [92 %-98 %] 96 %  BP: ()/(50-62) 92/55     Weight: 108 kg (238 lb 1.6 oz)  Body mass index is 31.41 kg/m².  Body surface area is 2.36 meters squared.      Intake/Output Summary (Last 24 hours) at 12/14/2023 1713  Last data filed at 12/14/2023 1700  Gross per 24 hour   Intake 3568.9 ml   Output 870 ml   Net 2698.9 ml        Physical Exam  Vitals reviewed.   Constitutional:       General: He is not in acute distress.     Appearance: He is ill-appearing. He is not toxic-appearing.   Cardiovascular:      Rate and Rhythm: Normal rate.   Pulmonary:      Effort: Pulmonary effort is normal.   Abdominal:      General: There is distension.   Musculoskeletal:      Right lower leg: No edema.      Left lower leg: No edema.   Skin:     Coloration: Skin is not jaundiced.   Neurological:      Mental Status: He is oriented to person, place, and time. Mental status is at baseline.          Significant Labs:   CBC:   Recent Labs   Lab 12/13/23  0320 12/14/23  0303   WBC 18.75* 14.50*   HGB 9.7* 9.2*   HCT 29.8* 27.5*    277    and CMP:   Recent Labs   Lab 12/13/23  0320 12/14/23  0303   * 129*   K 5.4* 5.0   CL 95 98   CO2 23 19*   * 116*   BUN 59* 51*   CREATININE 1.5* 1.3   CALCIUM 8.3* 8.0*   PROT 5.4* 4.9*   ALBUMIN 2.0* 1.8*   BILITOT 0.3 0.2   ALKPHOS 57 55   AST 33 37   ALT 11 9*   ANIONGAP 11 12       Diagnostic Results:  I have reviewed all pertinent imaging results/findings within the past 24 hours.

## 2023-12-14 NOTE — ASSESSMENT & PLAN NOTE
58 year old male with recent diagnosis of now metastatic neuroendocrine carcinoma. Presumed gastric in origin but no distinguishing primary features on path. Admitted for presumed septic shock. Independent prior to this decline but had recent PEG tube due to dysphagia. Planned to initiate carbo/etoposide with Dr Lopez prior to this admit.     -unclear if patient will recover from this acute episode well enough to tolerate chemo  -reasonable to hope for that at this time, but understands he could remain too ill to benefit  -recommend clear and direct communication regarding prognosis and treatment options as course unfolds  -unclear what patient's plan for enteral access will be if structural/mechanical dysphagia progresses   -could be a good candidate alternative access (j tube) pending clinical course which will likely shape overall prognosis

## 2023-12-14 NOTE — ASSESSMENT & PLAN NOTE
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 Pain is primarily aching, stabbing, occasionally burning. Had acceptable control prior to admit with oxycodone 5mg using 2-3x per day, now with acute worsening of baseline pain    12/14 OME ~60    Recommendations  -patient tolerating oral liquids for now, notes was tolerating small pills (senna, oxycodone prior to admit)  -would start oxycodone oral liquid 5mg q4hr PRN for pain, dyspnea, or pain behaviors  -continue hydromorphone 0.5mg IV for severe breakthrough pain  -while not ideal for dose finding in the acute setting, can continue fentanyl patch 12mcg/72hrs  -would also start senna + miralax as tolerated to prevent OIC

## 2023-12-14 NOTE — PLAN OF CARE
MICU DAILY GOALS     Family/Goals of care/Code Status   Code Status: DNR    24H Vital Sign Range  Temp:  [97.7 °F (36.5 °C)-98.3 °F (36.8 °C)]   Pulse:  []   Resp:  [15-28]   BP: ()/(50-64)   SpO2:  [93 %-100 %]      Shift Events (include procedures and significant events)  No acute events over night. Remains on low dose peripheral Levophed. Pain medication given prn as ordered.    AWAKE RASS: Goal - RASS Goal: 0-->alert and calm  Actual - RASS (Estrada Agitation-Sedation Scale): restless    Restraint necessity: Not necessary   BREATHE SBT: Not intubated    Coordinate A & B, analgesics/sedatives Pain: managed   SAT: Not intubated   Delirium CAM-ICU: Overall CAM-ICU: Negative   Early(intubated/ Progressive (non-intubated) Mobility MOVE Screen (INTUBATED ONLY): Not intubated    Activity: Activity Management: Rolling - L1   Feeding/Nutrition Diet order: Diet/Nutrition Received: full liquid,     Thrombus DVT prophylaxis:     HOB Elevation Head of Bed (HOB) Positioning: HOB at 30-45 degrees   Ulcer Prophylaxis GI: yes   Glucose control managed     Skin Skin assessed during: Daily Assessment    Sacrum intact/not altered? Yes  Heels intact/not altered? Yes  Surgical wound? No    Check one (no altered skin or altered skin) and sub boxes:  [x] No Altered Skin Integrity Present    [x]Prevention Measures Documented    [] Altered Skin Integrity Present or Discovered   [] LDA present in EPIC              [] LDA added in EPIC   [] Wound Image Taken (required on admit,                   transfer/discharge and every Tuesday)    Wound Care Consulted? No    Attending Nurse: Madhu Simpson RN    Second RN/Staff Member: Jacquelin Almazan RN   Bowel Function no issues    Indwelling Catheter Necessity         De-escalation Antibiotics Yes       VS and assessment per flow sheet, patient progressing towards goals as tolerated, plan of care reviewed with [unfilled] and family, all concerns addressed at this time.     Madhu Simpson RN     .

## 2023-12-14 NOTE — PROGRESS NOTES
[facility] - Cardiac Medical ICU  Palliative Medicine  Progress Note    Patient Name: David Benjamin Jr.  MRN: [id number]  Admission Date: 12/12/2023  Hospital Length of Stay: 2 days  Code Status: DNR   Attending Provider: Holger Brasher*  Consulting Provider: Rafal Heller MD  Primary Care Physician: Codi Chavez FNP  Principal Problem:Septic shock    Patient information was obtained from patient, spouse/SO, past medical records, and primary team.      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 Pain is primarily aching, stabbing, occasionally burning. Had acceptable control prior to admit with oxycodone 5mg using 2-3x per day, now with acute worsening of baseline pain    12/14 OME ~60    Recommendations  -patient tolerating oral liquids for now, notes was tolerating small pills (senna, oxycodone prior to admit)  -would start oxycodone oral liquid 5mg q4hr PRN for pain, dyspnea, or pain behaviors  -continue hydromorphone 0.5mg IV for severe breakthrough pain  -while not ideal for dose finding in the acute setting, can continue fentanyl patch 12mcg/72hrs  -would also start senna + miralax as tolerated to prevent OIC      Primary neuroendocrine carcinoma of stomach  58 year old male with recent diagnosis of now metastatic neuroendocrine carcinoma. Presumed gastric in origin but no distinguishing primary features on path. Admitted for presumed septic shock. Independent prior to this decline but had recent PEG tube due to dysphagia. Planned to initiate carbo/etoposide with Dr Lopez prior to this admit.     -unclear if patient will recover from this acute episode well enough to tolerate chemo  -reasonable to hope for that at this time, but understands he could remain too ill to benefit  -recommend clear and direct communication regarding prognosis and  treatment options as course unfolds  -unclear what patient's plan for enteral access will be if structural/mechanical dysphagia progresses   -could be a good candidate alternative access (j tube) pending clinical course which will likely shape overall prognosis       GI  PEG tube malfunction  Placed for dysphagia likely related to GE junction tumor burden. Malfunctioning/clogging on admit possibly related to local disease    12/13 surgery noted PEG tube not viable for use currently, did not offer plan for alternative enteral access    Palliative Care  Palliative care encounter  See ACP 12/13-12/14    Insight/goals of care- good insight despite acuity of decline and new diagnosis of incurable cancer. Remains hopeful he can improve to the point where he might benefit from palliative chemo but understands the limitations if he continues to decline. Clear limits on care if he appears to be dying.     Social support- supportive wife and parents (still living) has may friends and strong [employer] community in [place]    Psychological- fair coping strategies given gravity of recent developments     Spiritual- Holiness, spiritual needs met by local community    Symptom management- cancer-related pain (previously on tramadol, oxy5/10s), also with hiccups and nausea    Recommendations  -DNR, full support, would consider procedures and vasopressors as long as there remains a reasonable chance of recovery  -will continue to engage on other limits he might place on his care (did not specifically address CELY or HD)  -recommend trial of chlorpromazine 25mg IV q8hr PRN for uncomfortable hiccups/nausea        I will follow-up with patient. Please contact us if you have any additional questions.    Subjective:     Chief Complaint:   Chief Complaint   Patient presents with    Multiple complaints     Sent from [place] , not on chemo at present, abnormal kidney function, need to check feeding tube       HPI:   Mr. Benjamin is a 58 year  old male with ercently diagnosed neuroendocrine gastric cancer with rapid progression including widely metastatic disease (ribs, sacrum, omentum/peritoneal, thoracic and abdominal LN) on recent PET 12/12, who presented the same day from oncology clinic with generalized weakness and malfunctioning PEG tube (recently placed at OSH).  He was found to be hypotensive, tachycardic, tachypneic, with an elevated WBC count and PERLA. Admitted to MICU, started on antibiotics and vasopressors. Palliative care consulted to discuss goals of care and assist with symptom management      Interval History: Chart reviewed including 24h medication use. Patient resting in bed, awake and conversant. Continued abdominal pain, wife at bedside during visit. Patient tolerating oral liquids    Pain medicines last 24 hours: hydromorphone 0.2mg x5 (20 OME), 0.5mg IV x2 (20 OME), 1mg x1 (20 OME)      Medications:  Continuous Infusions:   lactated ringers 125 mL/hr at 12/14/23 1300    NORepinephrine bitartrate-D5W 0.04 mcg/kg/min (12/14/23 1300)     Scheduled Meds:   cefTRIAXone (ROCEPHIN) IVPB  2 g Intravenous Q24H    fentaNYL  1 patch Transdermal Q72H    heparin (porcine)  5,000 Units Subcutaneous Q8H    metronidazole  500 mg Intravenous Q12H    pantoprazole  40 mg Intravenous Daily    vancomycin (VANCOCIN) IV (PEDS and ADULTS)  15 mg/kg Intravenous Once    vancomycin (VANCOCIN) IV (PEDS and ADULTS)  1,500 mg Intravenous Q24H     PRN Meds:chlorproMAZINE, HYDROmorphone, LIDOcaine, OLANZapine, ondansetron, oxyCODONE, sodium chloride 0.9%, Pharmacy to dose Vancomycin consult **AND** vancomycin - pharmacy to dose    Objective:     Vital Signs (Most Recent):  Temp: 97.3 °F (36.3 °C) (12/14/23 1100)  Pulse: 99 (12/14/23 1300)  Resp: 18 (12/14/23 1347)  BP: (!) 92/50 (12/14/23 1300)  SpO2: (!) 94 % (12/14/23 1300) Vital Signs (24h Range):  Temp:  [97.2 °F (36.2 °C)-98.3 °F (36.8 °C)] 97.3 °F (36.3 °C)  Pulse:  [] 99  Resp:  [15-28] 18  SpO2:  [93  "%-100 %] 94 %  BP: ()/(50-62) 92/50     Weight: 108 kg (238 lb 1.6 oz)  Body mass index is 31.41 kg/m².       Physical Exam  Vitals and nursing note reviewed.   Constitutional:       Appearance: He is ill-appearing.      Comments: Older, ill-appearing male lying in bed in no acute distress, awake and conversant; active hiccups but otherwise minimal pain behaviors   HENT:      Mouth/Throat:      Mouth: Mucous membranes are moist.   Eyes:      General: No scleral icterus.     Extraocular Movements: Extraocular movements intact.   Pulmonary:      Effort: Pulmonary effort is normal. No respiratory distress.   Abdominal:      General: Abdomen is flat.   Skin:     General: Skin is warm and dry.   Neurological:      General: No focal deficit present.      Mental Status: He is oriented to person, place, and time.   Psychiatric:         Mood and Affect: Mood normal.         Behavior: Behavior normal.         Thought Content: Thought content normal.         Judgment: Judgment normal.           CBC:   Recent Labs   Lab 12/14/23 0303   WBC 14.50*   HGB 9.2*   HCT 27.5*   MCV 84        BMP:  Recent Labs   Lab 12/14/23 0303   *   *   K 5.0   CL 98   CO2 19*   BUN 51*   CREATININE 1.3   CALCIUM 8.0*   MG 2.5     LFT:  Lab Results   Component Value Date    AST 37 12/14/2023    ALKPHOS 55 12/14/2023    BILITOT 0.2 12/14/2023     Albumin:   Albumin   Date Value Ref Range Status   12/14/2023 1.8 (L) 3.5 - 5.2 g/dL Final     Protein:   Total Protein   Date Value Ref Range Status   12/14/2023 4.9 (L) 6.0 - 8.4 g/dL Final     Lactic acid:   No results found for: "LACTATE"    Reviewed CBC with stable blood counts, CMP with resolving PERLA    In my care of this patient with acute on chronic severe illness with threat to life and/or bodily function, I am recommending goal-concordant care as noted above. I spent a significant amount of time reviewing external records/ recommendations of other providers, reviewing " recent test results, and discussed care with other subspecialists involved    The above recommendations communicated directly to primary team on 12/14      Rafal Heller MD  Palliative Medicine  [facility] - Cardiac Medical ICU

## 2023-12-14 NOTE — ASSESSMENT & PLAN NOTE
-- New Diagnosis  -- Patient of Dr. Lopez.  -- There was plan to initiate carboplatin and etoposide as outpatient however patient noted to have PERLA and subsequently presented to the ER.  -- PET-CT showed progressive disease when compared to initial CT scan.  -- Given concern for debility, renal dysfunction and progressive disease, the goal at this time is the recover this acute illness.  -- It is concerning that given current septic shock, organ dysfunction and progressive disease, uncertain if he may be strong enough to get treatment.    -- We discussed that recent CT shows the PEG tube is appropriately placed, and as such GI symptoms is experiencing may be related to progressive peritoneal carcinomatosis.  -- Had a very long conversation with patient and spouse at bedside.  All questions asked were answered to their satisfaction.  -- Overall prognosis remains guarded.

## 2023-12-14 NOTE — ASSESSMENT & PLAN NOTE
See ACP 12/13-12/14    Insight/goals of care- good insight despite acuity of decline and new diagnosis of incurable cancer. Remains hopeful he can improve to the point where he might benefit from palliative chemo but understands the limitations if he continues to decline. Clear limits on care if he appears to be dying.     Social support- supportive wife and parents (still living) has may friends and strong [employer] community in [place]    Psychological- fair coping strategies given gravity of recent developments     Spiritual- Congregational, spiritual needs met by local community    Symptom management- cancer-related pain (previously on tramadol, oxy5/10s), also with hiccups and nausea    Recommendations  -DNR, full support, would consider procedures and vasopressors as long as there remains a reasonable chance of recovery  -will continue to engage on other limits he might place on his care (did not specifically address CELY or HD)  -recommend trial of chlorpromazine 25mg IV q8hr PRN for uncomfortable hiccups/nausea

## 2023-12-14 NOTE — SUBJECTIVE & OBJECTIVE
Interval History: Chart reviewed including 24h medication use. Patient resting in bed, awake and conversant. Continued abdominal pain, wife at bedside during visit. Patient tolerating oral liquids    Pain medicines last 24 hours: hydromorphone 0.2mg x5 (20 OME), 0.5mg IV x2 (20 OME), 1mg x1 (20 OME)      Medications:  Continuous Infusions:   lactated ringers 125 mL/hr at 12/14/23 1300    NORepinephrine bitartrate-D5W 0.04 mcg/kg/min (12/14/23 1300)     Scheduled Meds:   cefTRIAXone (ROCEPHIN) IVPB  2 g Intravenous Q24H    fentaNYL  1 patch Transdermal Q72H    heparin (porcine)  5,000 Units Subcutaneous Q8H    metronidazole  500 mg Intravenous Q12H    pantoprazole  40 mg Intravenous Daily    vancomycin (VANCOCIN) IV (PEDS and ADULTS)  15 mg/kg Intravenous Once    vancomycin (VANCOCIN) IV (PEDS and ADULTS)  1,500 mg Intravenous Q24H     PRN Meds:chlorproMAZINE, HYDROmorphone, LIDOcaine, OLANZapine, ondansetron, oxyCODONE, sodium chloride 0.9%, Pharmacy to dose Vancomycin consult **AND** vancomycin - pharmacy to dose    Objective:     Vital Signs (Most Recent):  Temp: 97.3 °F (36.3 °C) (12/14/23 1100)  Pulse: 99 (12/14/23 1300)  Resp: 18 (12/14/23 1347)  BP: (!) 92/50 (12/14/23 1300)  SpO2: (!) 94 % (12/14/23 1300) Vital Signs (24h Range):  Temp:  [97.2 °F (36.2 °C)-98.3 °F (36.8 °C)] 97.3 °F (36.3 °C)  Pulse:  [] 99  Resp:  [15-28] 18  SpO2:  [93 %-100 %] 94 %  BP: ()/(50-62) 92/50     Weight: 108 kg (238 lb 1.6 oz)  Body mass index is 31.41 kg/m².       Physical Exam  Vitals and nursing note reviewed.   Constitutional:       Appearance: He is ill-appearing.      Comments: Older, ill-appearing male lying in bed in no acute distress, awake and conversant; active hiccups but otherwise minimal pain behaviors   HENT:      Mouth/Throat:      Mouth: Mucous membranes are moist.   Eyes:      General: No scleral icterus.     Extraocular Movements: Extraocular movements intact.   Pulmonary:      Effort: Pulmonary  "effort is normal. No respiratory distress.   Abdominal:      General: Abdomen is flat.   Skin:     General: Skin is warm and dry.   Neurological:      General: No focal deficit present.      Mental Status: He is oriented to person, place, and time.   Psychiatric:         Mood and Affect: Mood normal.         Behavior: Behavior normal.         Thought Content: Thought content normal.         Judgment: Judgment normal.           CBC:   Recent Labs   Lab 12/14/23 0303   WBC 14.50*   HGB 9.2*   HCT 27.5*   MCV 84        BMP:  Recent Labs   Lab 12/14/23 0303   *   *   K 5.0   CL 98   CO2 19*   BUN 51*   CREATININE 1.3   CALCIUM 8.0*   MG 2.5     LFT:  Lab Results   Component Value Date    AST 37 12/14/2023    ALKPHOS 55 12/14/2023    BILITOT 0.2 12/14/2023     Albumin:   Albumin   Date Value Ref Range Status   12/14/2023 1.8 (L) 3.5 - 5.2 g/dL Final     Protein:   Total Protein   Date Value Ref Range Status   12/14/2023 4.9 (L) 6.0 - 8.4 g/dL Final     Lactic acid:   No results found for: "LACTATE"    Reviewed CBC with stable blood counts, CMP with resolving PERLA    "

## 2023-12-14 NOTE — CONSULTS
[facility] - [facility]  Hematology/Oncology  Consult Note    Patient Name: David Benjamin Jr.  MRN: [id number]  Admission Date: 12/12/2023  Hospital Length of Stay: 2 days  Code Status: DNR   Attending Provider: Holger Brasher*  Consulting Provider: Juan Ramon Chapman MD  Primary Care Physician: Codi Chavez FNP  Principal Problem:Septic shock    Inpatient consult to Hematology/Oncology  Consult performed by: Juan Ramon Chapman MD  Consult ordered by: Bull Palmer MD        Subjective:     HPI:  58-year-old male medical history significant for gastric neuroendocrine tumor, presents feeling evaluation of the oncology clinic.  He was noted to have PERLA on further referred to the ER.    We have been consulted in light of his oncological history.    Of note, patient did have a recent PET/CT study which was concerning for disease progression.  Discussed with outpatient oncologist, Dr. Lopez who adviced patient to get better and stronger before going forward with cancer directed care.   Presentation to the ER, to be hypotensive requiring initiation of vasopressors in the ER.  Laboratory investigation was significant for leukocytosis, anemia, PERLA and hyponatremia.  Chest x-ray showed cardiopulmonary abnormality.  CT C/A/P revealed a large mass in distal esophagus, stomach and adjacent mesentery  with omental and peritoneal metastatic disease. Metastatic disease noted to the retroperitoneum. Peg tube appears positioned.  The presence of stable bibasilar pleural effusions. Moderate diffuse ascites      Oncology Treatment Plan:   OP CARBOPLATIN (AUC) + ETOPOSIDE Q3W    Medications:  Continuous Infusions:   lactated ringers 125 mL/hr at 12/14/23 1710    NORepinephrine bitartrate-D5W 0.04 mcg/kg/min (12/14/23 1700)     Scheduled Meds:   cefTRIAXone (ROCEPHIN) IVPB  2 g Intravenous Q24H    fentaNYL  1 patch Transdermal Q72H    heparin (porcine)  5,000 Units Subcutaneous Q8H    metronidazole  500 mg  Intravenous Q12H    pantoprazole  40 mg Intravenous Daily    vancomycin (VANCOCIN) IV (PEDS and ADULTS)  15 mg/kg Intravenous Once    vancomycin (VANCOCIN) IV (PEDS and ADULTS)  1,500 mg Intravenous Q24H     PRN Meds:chlorproMAZINE, HYDROmorphone, LIDOcaine, OLANZapine, ondansetron, oxyCODONE, sodium chloride 0.9%, Pharmacy to dose Vancomycin consult **AND** vancomycin - pharmacy to dose     Review of patient's allergies indicates:   Allergen Reactions    Nsaids (non-steroidal anti-inflammatory drug) Hives     With ASA and Ibuprofen    Penicillins Hives and Itching        History reviewed. No pertinent past medical history.  Past Surgical History:   Procedure Laterality Date    ESOPHAGOGASTRODUODENOSCOPY N/A 11/21/2023    Procedure: EGD (ESOPHAGOGASTRODUODENOSCOPY);  Surgeon: Princess Voss MD;  Location: [address];  Service: Gastroenterology;  Laterality: N/A;     Family History    None       Tobacco Use    Smoking status: Never     Passive exposure: Never    Smokeless tobacco: Never   Substance and Sexual Activity    Alcohol use: Not Currently    Drug use: Never    Sexual activity: Not Currently       Review of Systems   Constitutional:  Positive for activity change and fatigue.   Respiratory: Negative.     Cardiovascular: Negative.    Gastrointestinal:  Positive for abdominal pain. Negative for diarrhea.   Genitourinary: Negative.    Musculoskeletal: Negative.    Neurological:  Negative for weakness.     Objective:     Vital Signs (Most Recent):  Temp: 97.3 °F (36.3 °C) (12/14/23 1500)  Pulse: 92 (12/14/23 1700)  Resp: 13 (12/14/23 1700)  BP: (!) 92/55 (12/14/23 1700)  SpO2: 96 % (12/14/23 1700) Vital Signs (24h Range):  Temp:  [97.2 °F (36.2 °C)-98.3 °F (36.8 °C)] 97.3 °F (36.3 °C)  Pulse:  [] 92  Resp:  [13-28] 13  SpO2:  [92 %-98 %] 96 %  BP: ()/(50-62) 92/55     Weight: 108 kg (238 lb 1.6 oz)  Body mass index is 31.41 kg/m².  Body surface area is 2.36 meters  squared.      Intake/Output Summary (Last 24 hours) at 12/14/2023 1713  Last data filed at 12/14/2023 1700  Gross per 24 hour   Intake 3568.9 ml   Output 870 ml   Net 2698.9 ml        Physical Exam  Vitals reviewed.   Constitutional:       General: He is not in acute distress.     Appearance: He is ill-appearing. He is not toxic-appearing.   Cardiovascular:      Rate and Rhythm: Normal rate.   Pulmonary:      Effort: Pulmonary effort is normal.   Abdominal:      General: There is distension.   Musculoskeletal:      Right lower leg: No edema.      Left lower leg: No edema.   Skin:     Coloration: Skin is not jaundiced.   Neurological:      Mental Status: He is oriented to person, place, and time. Mental status is at baseline.          Significant Labs:   CBC:   Recent Labs   Lab 12/13/23 0320 12/14/23 0303   WBC 18.75* 14.50*   HGB 9.7* 9.2*   HCT 29.8* 27.5*    277    and CMP:   Recent Labs   Lab 12/13/23 0320 12/14/23  0303   * 129*   K 5.4* 5.0   CL 95 98   CO2 23 19*   * 116*   BUN 59* 51*   CREATININE 1.5* 1.3   CALCIUM 8.3* 8.0*   PROT 5.4* 4.9*   ALBUMIN 2.0* 1.8*   BILITOT 0.3 0.2   ALKPHOS 57 55   AST 33 37   ALT 11 9*   ANIONGAP 11 12       Diagnostic Results:  I have reviewed all pertinent imaging results/findings within the past 24 hours.  Assessment/Plan:     Primary neuroendocrine carcinoma of stomach  -- New Diagnosis  -- Patient of Dr. Lopez.  -- There was plan to initiate carboplatin and etoposide as outpatient however patient noted to have PERLA and subsequently presented to the ER.  -- PET-CT showed progressive disease when compared to initial CT scan.  -- Given concern for debility, renal dysfunction and progressive disease, the goal at this time is the recover this acute illness.  -- It is concerning that given current septic shock, organ dysfunction and progressive disease, uncertain if he may be strong enough to get treatment.    -- We discussed that recent CT shows the PEG  tube is appropriately placed, and as such GI symptoms is experiencing may be related to progressive peritoneal carcinomatosis.  -- Had a very long conversation with patient and spouse at bedside.  All questions asked were answered to their satisfaction.  -- Overall prognosis remains guarded.      * Septic shock  Currently in the ICU on 2 pressors.    Primarily managed by primary team.      Patient seen with the Oncology staff Dr. Mckeon    Thank you for your consult. I will follow-up with patient. Please contact us if you have any additional questions.    Juan Ramon Chapman MD  Hematology/Oncology  [facility] - Cardiac Medical ICU

## 2023-12-14 NOTE — PROGRESS NOTES
Pharmacokinetic Assessment Follow Up: IV Vancomycin    Vancomycin serum concentration assessment(s):    Vancomycin random level resulted at 8.9 mcg/mL approximately 26 hours after the previous dose. Goal level is 10 to 20 mcg/mL.     Drug levels (last 3 results):  Recent Labs   Lab Result Units 12/14/23  0303   Vancomycin, Random ug/mL 8.9     Vancomycin Regimen Plan:    Schedule vancomycin 1500 mg IV every 24 hours with next serum trough concentration measured on 12/16 0800.     Pharmacy will continue to follow and monitor vancomycin.    Please contact pharmacy at extension [phone] for questions regarding this assessment.    Thank you for the consult,   Macie Steve, PharmD, BCCCP                 Patient brief summary:  David Benjamin Jr. is a 58 y.o. male initiated on antimicrobial therapy with IV vancomycin for treatment of sepsis    Drug Allergies:   Review of patient's allergies indicates:   Allergen Reactions    Nsaids (non-steroidal anti-inflammatory drug) Hives     With ASA and Ibuprofen    Penicillins Hives and Itching       Actual Body Weight:   108 kg     Renal Function:   Estimated Creatinine Clearance: 77.9 mL/min (based on SCr of 1.3 mg/dL).    Dialysis Method (if applicable):  N/A    CBC (last 72 hours):  Recent Labs   Lab Result Units 12/12/23  1403 12/13/23  0320 12/14/23  0303   WBC K/uL 19.58* 18.75* 14.50*   Hemoglobin g/dL 11.3* 9.7* 9.2*   Hematocrit % 33.4* 29.8* 27.5*   Platelets K/uL 320 300 277   Gran % % 87.6* 86.6* 84.6*   Lymph % % 6.2* 6.7* 7.4*   Mono % % 4.5 5.3 6.3   Eosinophil % % 0.0 0.0 0.1   Basophil % % 0.2 0.1 0.1   Differential Method  Automated Automated Automated       Metabolic Panel (last 72 hours):  Recent Labs   Lab Result Units 12/12/23  1403 12/12/23 2037 12/13/23  0210 12/13/23  0320 12/14/23  0303   Sodium mmol/L 130*  --   --  129* 129*   Potassium mmol/L 5.5*  --   --  5.4* 5.0   Chloride mmol/L 94*  --   --  95 98   CO2 mmol/L 20*  --   --  23 19*    Glucose mg/dL 108  --   --  118* 116*   Glucose, Fluid mg/dL  --   --  75  --   --    Glucose, UA   --  Negative  --   --   --    BUN mg/dL 63*  --   --  59* 51*   Creatinine mg/dL 1.7*  --   --  1.5* 1.3   Albumin g/dL 2.2*  --   --  2.0* 1.8*   Total Bilirubin mg/dL 0.4  --   --  0.3 0.2   Alkaline Phosphatase U/L 64  --   --  57 55   AST U/L 31  --   --  33 37   ALT U/L 12  --   --  11 9*   Magnesium mg/dL  --   --   --  2.5 2.5   Phosphorus mg/dL  --   --   --  4.7* 4.1       Vancomycin Administrations:  vancomycin given in the last 96 hours                     vancomycin 1,500 mg in dextrose 5 % (D5W) 250 mL IVPB (Vial-Mate) ()  Restarted 12/14/23 1025     1,500 mg New Bag  0956                    Microbiologic Results:  Microbiology Results (last 7 days)       Procedure Component Value Units Date/Time    Aerobic culture [[id number]] Collected: 12/13/23 0210    Order Status: Completed Specimen: Ascites Updated: 12/14/23 0859     Aerobic Bacterial Culture No growth    Culture, Anaerobic [[id number]] Collected: 12/13/23 0210    Order Status: Completed Specimen: Ascites Updated: 12/14/23 0733     Anaerobic Culture Culture in progress    Blood culture x two cultures. Draw prior to antibiotics. [[id number]] Collected: 12/12/23 1638    Order Status: Completed Specimen: Blood from Peripheral, Hand, Left Updated: 12/13/23 1812     Blood Culture, Routine No Growth to date      No Growth to date    Narrative:      Aerobic and anaerobic    Blood culture x two cultures. Draw prior to antibiotics. [[id number]] Collected: 12/12/23 1649    Order Status: Completed Specimen: Blood from Peripheral, Hand, Right Updated: 12/13/23 1812     Blood Culture, Routine No Growth to date      No Growth to date    Narrative:      Aerobic and anaerobic    Gram stain [[id number]] Collected: 12/13/23 0210    Order Status: Completed Specimen: Ascites Updated: 12/13/23 0801     Gram Stain Result Rare WBC's      No organisms seen    Fungus  culture [[id number]] Collected: 12/13/23 0210    Order Status: Sent Specimen: Ascites Updated: 12/13/23 0611

## 2023-12-14 NOTE — ASSESSMENT & PLAN NOTE
Patient with PERLA on admission, likely pre-renal in etiology in setting of decreased intake with PEG tube  dysfunction.      Cr at admission: 1.7  Cr at baseline: 0.7  Estimated Creatinine Clearance: 77.9 mL/min (based on SCr of 1.3 mg/dL).     -- s/p sepsis fluid bolus in ED  -- Trending CMP  -- Renally dose medications  -- avoid nephrotoxic agents   -- Strict I&O   -- MAP > 65. Vasopressors   -- Hgb > 7

## 2023-12-14 NOTE — ASSESSMENT & PLAN NOTE
Placed for dysphagia likely related to GE junction tumor burden. Malfunctioning/clogging on admit possibly related to local disease    12/13 surgery noted PEG tube not viable for use currently, did not offer plan for alternative enteral access

## 2023-12-14 NOTE — HOSPITAL COURSE
Pt continued on peripheral vasopressors. Gen Surg consulted for problematoc PEG tube, appreciate recs. PEG tube noted to be in appropriate position with gastric lumen is compressed. PEG aspiration likely 2/t position against posterior wall of the stomach. Gen Surg recommended not to use PEG for tube feeds, but will need to remain in place for 6 weeks s/p placement, prior to consideration for removal. Peripheral norepinephrine weaned accordingly. Pt advanced to full liquid diet and tolerating without aspiration. Pain management adjusted to 12.5mcg with fentanyl patch. Oxycodone 5mg PO soln PRN for mod breakthru pain. Hydromorphone 0.5mg IV PRN for severe breakthru pain. Pt continued on IVF in setting of PERLA, Scr improving. Pt more vasopressor support to maintain MAPs > 65. Adjusted analgesics to PO meds such that they could be prescribed on discharge. Pt is hypervolemic, +8.5L. Increased abdominal distention with tenderness to palpation noted. Vasopressor requirements increasing. Severe ascites present on abdominal US, now s/p therapeutic paracentesis. Patient transitioned to comfort care evening of 12/18. Ms Benjamin and his wife are seeking hospice options at this time,     Vital Signs (Most Recent):  Temp: 97.2 °F (36.2 °C) (12/18/23 0700)  Pulse: 88 (12/18/23 1000)  Resp: 19 (12/18/23 1040)  BP: (!) 89/53 (12/18/23 1000)  SpO2: 96 % (12/18/23 1000) Vital Signs (24h Range):  Temp:  [97.1 °F (36.2 °C)-97.4 °F (36.3 °C)] 97.2 °F (36.2 °C)  Pulse:  [71-97] 88  Resp:  [14-25] 19  SpO2:  [87 %-99 %] 96 %  BP: ()/(38-62) 89/53   Weight: 113 kg (249 lb 1.9 oz)  Body mass index is 32.87 kg/m².        Intake/Output Summary (Last 24 hours) at 12/18/2023 1136  Last data filed at 12/18/2023 1000      Gross per 24 hour   Intake 2054.75 ml   Output 4860 ml   Net -2805.25 ml               Physical Exam  Vitals reviewed.   Constitutional:       General: He is not in acute distress.     Appearance: He is ill-appearing. He is  not toxic-appearing.   HENT:      Nose: Nose normal.      Mouth/Throat:      Mouth: Mucous membranes are moist.      Pharynx: Oropharynx is clear.   Eyes:      Extraocular Movements: Extraocular movements intact.      Conjunctiva/sclera: Conjunctivae normal.   Cardiovascular:      Rate and Rhythm: Normal rate and regular rhythm.      Pulses:           Radial pulses are 2+ on the right side and 2+ on the left side.        Dorsalis pedis pulses are 2+ on the right side and 2+ on the left side.      Heart sounds: No murmur heard.  Pulmonary:      Effort: Pulmonary effort is normal. No respiratory distress.      Breath sounds: No wheezing.   Abdominal:      General: Bowel sounds are normal. There is distension.      Tenderness: There is generalized abdominal tenderness. There is no right CVA tenderness, left CVA tenderness, guarding or rebound. Negative signs include Sanford's sign.      Comments: PEG tube in LUQ  Severe abdominal distention  Diffuse abdominal tenderness    Musculoskeletal:         General: Swelling and tenderness present.      Cervical back: No tenderness.      Right lower leg: Edema present.      Left lower leg: Edema present.   Skin:     Capillary Refill: Capillary refill takes less than 2 seconds.      Coloration: Skin is not jaundiced or pale.   Neurological:      Mental Status: He is alert and oriented to person, place, and time. Mental status is at baseline.

## 2023-12-15 PROBLEM — E44.0 MODERATE MALNUTRITION: Status: ACTIVE | Noted: 2023-12-15

## 2023-12-15 PROBLEM — I95.9 ACUTE HYPOTENSION: Status: ACTIVE | Noted: 2023-12-15

## 2023-12-15 LAB
ALBUMIN SERPL BCP-MCNC: 1.8 G/DL (ref 3.5–5.2)
ALP SERPL-CCNC: 54 U/L (ref 55–135)
ALT SERPL W/O P-5'-P-CCNC: 10 U/L (ref 10–44)
ANION GAP SERPL CALC-SCNC: 10 MMOL/L (ref 8–16)
AST SERPL-CCNC: 33 U/L (ref 10–40)
AV INDEX (PROSTH): 1
AV MEAN GRADIENT: 3 MMHG
AV PEAK GRADIENT: 3 MMHG
AV VALVE AREA BY VELOCITY RATIO: 2.83 CM²
AV VALVE AREA: 3.3 CM²
AV VELOCITY RATIO: 0.86
BASOPHILS # BLD AUTO: 0.02 K/UL (ref 0–0.2)
BASOPHILS NFR BLD: 0.1 % (ref 0–1.9)
BILIRUB SERPL-MCNC: 0.2 MG/DL (ref 0.1–1)
BSA FOR ECHO PROCEDURE: 2.36 M2
BUN SERPL-MCNC: 41 MG/DL (ref 6–20)
CALCIUM SERPL-MCNC: 7.6 MG/DL (ref 8.7–10.5)
CHLORIDE SERPL-SCNC: 96 MMOL/L (ref 95–110)
CO2 SERPL-SCNC: 18 MMOL/L (ref 23–29)
CREAT SERPL-MCNC: 1.3 MG/DL (ref 0.5–1.4)
CV ECHO LV RWT: 0.34 CM
DIFFERENTIAL METHOD: ABNORMAL
DOP CALC AO PEAK VEL: 0.91 M/S
DOP CALC AO VTI: 11.94 CM
DOP CALC LVOT AREA: 3.3 CM2
DOP CALC LVOT DIAMETER: 2.05 CM
DOP CALC LVOT PEAK VEL: 0.78 M/S
DOP CALC LVOT STROKE VOLUME: 39.36 CM3
DOP CALCLVOT PEAK VEL VTI: 11.93 CM
ECHO LV POSTERIOR WALL: 0.84 CM (ref 0.6–1.1)
EOSINOPHIL # BLD AUTO: 0 K/UL (ref 0–0.5)
EOSINOPHIL NFR BLD: 0.2 % (ref 0–8)
ERYTHROCYTE [DISTWIDTH] IN BLOOD BY AUTOMATED COUNT: 14.7 % (ref 11.5–14.5)
EST. GFR  (NO RACE VARIABLE): >60 ML/MIN/1.73 M^2
FRACTIONAL SHORTENING: 41 % (ref 28–44)
GLUCOSE SERPL-MCNC: 181 MG/DL (ref 70–110)
HCT VFR BLD AUTO: 29.8 % (ref 40–54)
HGB BLD-MCNC: 9.9 G/DL (ref 14–18)
IMM GRANULOCYTES # BLD AUTO: 0.27 K/UL (ref 0–0.04)
IMM GRANULOCYTES NFR BLD AUTO: 1.6 % (ref 0–0.5)
INTERVENTRICULAR SEPTUM: 0.81 CM (ref 0.6–1.1)
LA MAJOR: 4.42 CM
LA MINOR: 3.52 CM
LA WIDTH: 3.86 CM
LEFT ATRIUM VOLUME INDEX MOD: 6 ML/M2
LEFT ATRIUM VOLUME MOD: 14.02 CM3
LEFT INTERNAL DIMENSION IN SYSTOLE: 2.89 CM (ref 2.1–4)
LEFT VENTRICLE DIASTOLIC VOLUME INDEX: 48.37 ML/M2
LEFT VENTRICLE DIASTOLIC VOLUME: 112.21 ML
LEFT VENTRICLE MASS INDEX: 59 G/M2
LEFT VENTRICLE SYSTOLIC VOLUME INDEX: 13.7 ML/M2
LEFT VENTRICLE SYSTOLIC VOLUME: 31.86 ML
LEFT VENTRICULAR INTERNAL DIMENSION IN DIASTOLE: 4.89 CM (ref 3.5–6)
LEFT VENTRICULAR MASS: 136.05 G
LYMPHOCYTES # BLD AUTO: 1.3 K/UL (ref 1–4.8)
LYMPHOCYTES NFR BLD: 7.7 % (ref 18–48)
MAGNESIUM SERPL-MCNC: 2.3 MG/DL (ref 1.6–2.6)
MCH RBC QN AUTO: 29 PG (ref 27–31)
MCHC RBC AUTO-ENTMCNC: 33.2 G/DL (ref 32–36)
MCV RBC AUTO: 87 FL (ref 82–98)
MONOCYTES # BLD AUTO: 0.9 K/UL (ref 0.3–1)
MONOCYTES NFR BLD: 5.3 % (ref 4–15)
NEUTROPHILS # BLD AUTO: 14.1 K/UL (ref 1.8–7.7)
NEUTROPHILS NFR BLD: 85.1 % (ref 38–73)
NRBC BLD-RTO: 0 /100 WBC
PHOSPHATE SERPL-MCNC: 3.5 MG/DL (ref 2.7–4.5)
PISA TR MAX VEL: 3.1 M/S
PLATELET # BLD AUTO: 297 K/UL (ref 150–450)
PMV BLD AUTO: 10.7 FL (ref 9.2–12.9)
POCT GLUCOSE: 155 MG/DL (ref 70–110)
POCT GLUCOSE: 155 MG/DL (ref 70–110)
POCT GLUCOSE: 164 MG/DL (ref 70–110)
POTASSIUM SERPL-SCNC: 4.6 MMOL/L (ref 3.5–5.1)
PROT SERPL-MCNC: 4.9 G/DL (ref 6–8.4)
RA MAJOR: 2.25 CM
RA PRESSURE ESTIMATED: 3 MMHG
RA WIDTH: 3.42 CM
RBC # BLD AUTO: 3.41 M/UL (ref 4.6–6.2)
RV TB RVSP: 6 MMHG
SINUS: 3.61 CM
SODIUM SERPL-SCNC: 124 MMOL/L (ref 136–145)
TDI LATERAL: 0.14 M/S
TDI SEPTAL: 0.07 M/S
TDI: 0.11 M/S
TR MAX PG: 38 MMHG
TRICUSPID ANNULAR PLANE SYSTOLIC EXCURSION: 1.51 CM
TV REST PULMONARY ARTERY PRESSURE: 41 MMHG
WBC # BLD AUTO: 16.55 K/UL (ref 3.9–12.7)
Z-SCORE OF LEFT VENTRICULAR DIMENSION IN END DIASTOLE: -6.38
Z-SCORE OF LEFT VENTRICULAR DIMENSION IN END SYSTOLE: -5.2

## 2023-12-15 PROCEDURE — 25000242 PHARM REV CODE 250 ALT 637 W/ HCPCS

## 2023-12-15 PROCEDURE — 63600175 PHARM REV CODE 636 W HCPCS: Performed by: INTERNAL MEDICINE

## 2023-12-15 PROCEDURE — 63600175 PHARM REV CODE 636 W HCPCS

## 2023-12-15 PROCEDURE — 99233 PR SUBSEQUENT HOSPITAL CARE,LEVL III: ICD-10-PCS | Mod: ,,, | Performed by: INTERNAL MEDICINE

## 2023-12-15 PROCEDURE — 25000003 PHARM REV CODE 250

## 2023-12-15 PROCEDURE — 99233 PR SUBSEQUENT HOSPITAL CARE,LEVL III: ICD-10-PCS | Mod: ,,, | Performed by: STUDENT IN AN ORGANIZED HEALTH CARE EDUCATION/TRAINING PROGRAM

## 2023-12-15 PROCEDURE — 99233 SBSQ HOSP IP/OBS HIGH 50: CPT | Mod: ,,, | Performed by: STUDENT IN AN ORGANIZED HEALTH CARE EDUCATION/TRAINING PROGRAM

## 2023-12-15 PROCEDURE — 80053 COMPREHEN METABOLIC PANEL: CPT | Performed by: NURSE PRACTITIONER

## 2023-12-15 PROCEDURE — 25000003 PHARM REV CODE 250: Performed by: INTERNAL MEDICINE

## 2023-12-15 PROCEDURE — 83735 ASSAY OF MAGNESIUM: CPT | Performed by: NURSE PRACTITIONER

## 2023-12-15 PROCEDURE — 25000003 PHARM REV CODE 250: Performed by: NURSE PRACTITIONER

## 2023-12-15 PROCEDURE — 99233 SBSQ HOSP IP/OBS HIGH 50: CPT | Mod: ,,, | Performed by: INTERNAL MEDICINE

## 2023-12-15 PROCEDURE — 85025 COMPLETE CBC W/AUTO DIFF WBC: CPT | Performed by: NURSE PRACTITIONER

## 2023-12-15 PROCEDURE — C9113 INJ PANTOPRAZOLE SODIUM, VIA: HCPCS

## 2023-12-15 PROCEDURE — 84100 ASSAY OF PHOSPHORUS: CPT | Performed by: NURSE PRACTITIONER

## 2023-12-15 PROCEDURE — 20000000 HC ICU ROOM

## 2023-12-15 PROCEDURE — 25500020 PHARM REV CODE 255: Performed by: INTERNAL MEDICINE

## 2023-12-15 PROCEDURE — 63600175 PHARM REV CODE 636 W HCPCS: Performed by: NURSE PRACTITIONER

## 2023-12-15 RX ORDER — INSULIN ASPART 100 [IU]/ML
0-10 INJECTION, SOLUTION INTRAVENOUS; SUBCUTANEOUS
Status: DISCONTINUED | OUTPATIENT
Start: 2023-12-15 | End: 2023-12-18

## 2023-12-15 RX ORDER — NOREPINEPHRINE BITARTRATE/D5W 4MG/250ML
0-.2 PLASTIC BAG, INJECTION (ML) INTRAVENOUS CONTINUOUS
Status: DISPENSED | OUTPATIENT
Start: 2023-12-15 | End: 2023-12-16

## 2023-12-15 RX ORDER — MUPIROCIN 20 MG/G
OINTMENT TOPICAL 2 TIMES DAILY
Status: DISCONTINUED | OUTPATIENT
Start: 2023-12-15 | End: 2023-12-18

## 2023-12-15 RX ORDER — OXYCODONE HYDROCHLORIDE 5 MG/1
5 TABLET ORAL
Status: DISCONTINUED | OUTPATIENT
Start: 2023-12-15 | End: 2023-12-15

## 2023-12-15 RX ORDER — OXYCODONE HYDROCHLORIDE 5 MG/1
5 TABLET ORAL
Status: DISCONTINUED | OUTPATIENT
Start: 2023-12-15 | End: 2023-12-16

## 2023-12-15 RX ORDER — ENOXAPARIN SODIUM 100 MG/ML
40 INJECTION SUBCUTANEOUS EVERY 24 HOURS
Status: DISCONTINUED | OUTPATIENT
Start: 2023-12-15 | End: 2023-12-18

## 2023-12-15 RX ORDER — GLUCAGON 1 MG
1 KIT INJECTION
Status: DISCONTINUED | OUTPATIENT
Start: 2023-12-15 | End: 2023-12-18

## 2023-12-15 RX ORDER — FLUDROCORTISONE ACETATE 0.1 MG/1
100 TABLET ORAL DAILY
Status: DISCONTINUED | OUTPATIENT
Start: 2023-12-15 | End: 2023-12-19 | Stop reason: HOSPADM

## 2023-12-15 RX ADMIN — CEFTRIAXONE 2 G: 2 INJECTION, POWDER, FOR SOLUTION INTRAMUSCULAR; INTRAVENOUS at 05:12

## 2023-12-15 RX ADMIN — OXYCODONE HYDROCHLORIDE 5 MG: 5 SOLUTION ORAL at 02:12

## 2023-12-15 RX ADMIN — PANTOPRAZOLE SODIUM 40 MG: 40 INJECTION, POWDER, FOR SOLUTION INTRAVENOUS at 08:12

## 2023-12-15 RX ADMIN — OXYCODONE HYDROCHLORIDE 5 MG: 5 SOLUTION ORAL at 07:12

## 2023-12-15 RX ADMIN — HYDROCORTISONE SODIUM SUCCINATE 100 MG: 100 INJECTION, POWDER, FOR SOLUTION INTRAMUSCULAR; INTRAVENOUS at 03:12

## 2023-12-15 RX ADMIN — METRONIDAZOLE 500 MG: 5 INJECTION, SOLUTION INTRAVENOUS at 08:12

## 2023-12-15 RX ADMIN — OXYCODONE HYDROCHLORIDE 5 MG: 5 TABLET ORAL at 06:12

## 2023-12-15 RX ADMIN — OXYCODONE HYDROCHLORIDE 5 MG: 5 TABLET ORAL at 10:12

## 2023-12-15 RX ADMIN — NOREPINEPHRINE BITARTRATE 0.1 MCG/KG/MIN: 4 INJECTION, SOLUTION INTRAVENOUS at 04:12

## 2023-12-15 RX ADMIN — OXYCODONE HYDROCHLORIDE 5 MG: 5 TABLET ORAL at 08:12

## 2023-12-15 RX ADMIN — NOREPINEPHRINE BITARTRATE 0.1 MCG/KG/MIN: 4 INJECTION, SOLUTION INTRAVENOUS at 10:12

## 2023-12-15 RX ADMIN — MUPIROCIN: 20 OINTMENT TOPICAL at 09:12

## 2023-12-15 RX ADMIN — OXYCODONE HYDROCHLORIDE 5 MG: 5 SOLUTION ORAL at 11:12

## 2023-12-15 RX ADMIN — ENOXAPARIN SODIUM 40 MG: 40 INJECTION SUBCUTANEOUS at 05:12

## 2023-12-15 RX ADMIN — INSULIN ASPART 2 UNITS: 100 INJECTION, SOLUTION INTRAVENOUS; SUBCUTANEOUS at 11:12

## 2023-12-15 RX ADMIN — METRONIDAZOLE 500 MG: 5 INJECTION, SOLUTION INTRAVENOUS at 09:12

## 2023-12-15 RX ADMIN — FLUDROCORTISONE ACETATE 100 MCG: 0.1 TABLET ORAL at 03:12

## 2023-12-15 RX ADMIN — OXYCODONE HYDROCHLORIDE 5 MG: 5 SOLUTION ORAL at 01:12

## 2023-12-15 RX ADMIN — VANCOMYCIN HYDROCHLORIDE 1500 MG: 1.5 INJECTION, POWDER, LYOPHILIZED, FOR SOLUTION INTRAVENOUS at 09:12

## 2023-12-15 RX ADMIN — HEPARIN SODIUM 5000 UNITS: 5000 INJECTION INTRAVENOUS; SUBCUTANEOUS at 06:12

## 2023-12-15 RX ADMIN — MUPIROCIN: 20 OINTMENT TOPICAL at 08:12

## 2023-12-15 RX ADMIN — HYDROMORPHONE HYDROCHLORIDE 0.5 MG: 0.5 INJECTION, SOLUTION INTRAMUSCULAR; INTRAVENOUS; SUBCUTANEOUS at 01:12

## 2023-12-15 RX ADMIN — HYDROMORPHONE HYDROCHLORIDE 0.5 MG: 0.5 INJECTION, SOLUTION INTRAMUSCULAR; INTRAVENOUS; SUBCUTANEOUS at 04:12

## 2023-12-15 RX ADMIN — INSULIN ASPART 2 UNITS: 100 INJECTION, SOLUTION INTRAVENOUS; SUBCUTANEOUS at 05:12

## 2023-12-15 RX ADMIN — HUMAN ALBUMIN MICROSPHERES AND PERFLUTREN 0.66 MG: 10; .22 INJECTION, SOLUTION INTRAVENOUS at 02:12

## 2023-12-15 RX ADMIN — NOREPINEPHRINE BITARTRATE 0.12 MCG/KG/MIN: 4 INJECTION, SOLUTION INTRAVENOUS at 08:12

## 2023-12-15 RX ADMIN — HYDROCORTISONE SODIUM SUCCINATE 100 MG: 100 INJECTION, POWDER, FOR SOLUTION INTRAMUSCULAR; INTRAVENOUS at 09:12

## 2023-12-15 RX ADMIN — NOREPINEPHRINE BITARTRATE 0.12 MCG/KG/MIN: 4 INJECTION, SOLUTION INTRAVENOUS at 03:12

## 2023-12-15 RX ADMIN — HYDROMORPHONE HYDROCHLORIDE 0.5 MG: 0.5 INJECTION, SOLUTION INTRAMUSCULAR; INTRAVENOUS; SUBCUTANEOUS at 08:12

## 2023-12-15 NOTE — ASSESSMENT & PLAN NOTE
Analgesia rpescribed for cancer related pain in setting of dysphagia  -- Fentanyl 12.5mcg patch  -- Oxycodone 5mg q2h PRN for moderate pain  -- Dilaudid 0.5mg q4h PRN for severe pain

## 2023-12-15 NOTE — ASSESSMENT & PLAN NOTE
See ACP 12/13-12/14    Insight/goals of care- good insight despite acuity of decline and new diagnosis of incurable cancer. Remains hopeful he can improve to the point where he might benefit from palliative chemo but understands the limitations if he continues to decline. Clear limits on care if he appears to be dying.     Social support- supportive wife and parents (still living) has may friends and strong Jew community in [place]    Psychological- fair coping strategies given gravity of recent developments     Spiritual- Muslim, spiritual needs met by local community    Symptom management- cancer-related pain (previously on tramadol, oxy5/10s), also with hiccups and nausea    Recommendations  -DNR, full support, would consider procedures and vasopressors as long as there remains a reasonable chance of recovery  -will continue to engage on other limits he might place on his care (did not specifically address CELY or HD)  -continue chlorpromazine 25mg IV q8hr PRN for uncomfortable hiccups/nausea

## 2023-12-15 NOTE — ASSESSMENT & PLAN NOTE
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 Pain is primarily aching, stabbing, occasionally burning. Had acceptable control prior to admit with oxycodone 5mg using 2-3x per day, now with acute worsening of baseline pain    12/15  125 OME> ~60    Recommendations  -patient tolerating oral liquids for now, notes was tolerating small pills (senna, oxycodone prior to admit)  -would increase oxycodone oral liquid to 7.5mg q4hr PRN for pain, dyspnea, or pain behaviors  -continue hydromorphone 0.5mg IV for severe breakthrough pain  -while not ideal for dose finding in the acute setting, can continue fentanyl patch 12mcg/72hrs  -would also start senna + miralax as tolerated to prevent OIC

## 2023-12-15 NOTE — ASSESSMENT & PLAN NOTE
This patient does have evidence of infective focus  My overall impression is septic shock due to MAP < 65 and SBP < 90.  Source: Abdominal    Antibiotics given- CTX and Flagyl given in ED. Will broaden coverage to Vanc/Zosyn and de-escalate as appropriate.   Antibiotics (72h ago, onward)    Start     Stop Route Frequency Ordered    12/14/23 2100  metronidazole IVPB 500 mg         -- IV Every 12 hours (non-standard times) 12/14/23 1323    12/14/23 0900  vancomycin 1,500 mg in dextrose 5 % (D5W) 250 mL IVPB (Vial-Mate)         -- IV Every 24 hours (non-standard times) 12/14/23 0835    12/13/23 1700  cefTRIAXone (ROCEPHIN) 2 g in dextrose 5 % in water (D5W) 100 mL IVPB (MB+)         -- IV Every 24 hours (non-standard times) 12/13/23 0034    12/13/23 0100  vancomycin 1,500 mg in dextrose 5 % (D5W) 250 mL IVPB (Vial-Mate)         -- IV Once 12/12/23 2359    12/13/23 0041  vancomycin - pharmacy to dose  (vancomycin IVPB (PEDS and ADULTS))        See Hyperspace for full Linked Orders Report.    -- IV pharmacy to manage frequency 12/12/23 2342        Latest lactate reviewed-  Recent Labs   Lab 12/12/23  1639   POCLAC 1.79       Organ dysfunction indicated by Acute kidney injury    Fluid challenge Actual Body weight- Patient will receive 30ml/kg actual body weight to calculate fluid bolus for treatment of septic shock.     Will Start Pressors- Levophed for MAP of 65  Source control achieved by: Abx.     -- Continue BSA   -- Follow up blood cultures   -- Diagnostic para at bedside. Follow up cultures  -- Vasopressors for MAP > 65

## 2023-12-15 NOTE — PLAN OF CARE
MICU DAILY GOALS     Family/Goals of care/Code Status   Code Status: DNR    24H Vital Sign Range  Temp:  [97.2 °F (36.2 °C)-98.3 °F (36.8 °C)]   Pulse:  []   Resp:  [13-28]   BP: ()/(50-62)   SpO2:  [92 %-98 %]      Shift Events (include procedures and significant events)   No acute events throughout shift, patient's pain managed with fentanyl patch 12mcg/hr; dilaudid .5mg q4h, oxycodone 5mg oral solution q3h; palliative care following; patient tolerating full liquid diet; levo infusing at .02; LR continued for PERLA; vitals stable; WCTM.    AWAKE RASS: Goal - RASS Goal: 0-->alert and calm  Actual - RASS (Estrada Agitation-Sedation Scale): alert and calm    Restraint necessity: Not necessary   BREATHE SBT: Not intubated    Coordinate A & B, analgesics/sedatives Pain: managed   SAT: Not intubated   Delirium CAM-ICU: Overall CAM-ICU: Negative   Early(intubated/ Progressive (non-intubated) Mobility MOVE Screen (INTUBATED ONLY): Not intubated    Activity: Activity Management: Up to bedside commode - L3   Feeding/Nutrition Diet order: Diet/Nutrition Received: full liquid,     Thrombus DVT prophylaxis: VTE Required Core Measure: Pharmacological prophylaxis initiated/maintained   HOB Elevation Head of Bed (HOB) Positioning: HOB at 30-45 degrees   Ulcer Prophylaxis GI: yes   Glucose control managed Glycemic Management: blood glucose monitored   Skin Skin assessed during: Daily Assessment    Sacrum intact/not altered? Yes  Heels intact/not altered? Yes  Surgical wound? Yes    Check one (no altered skin or altered skin) and sub boxes:  [] No Altered Skin Integrity Present    []Prevention Measures Documented    [] Altered Skin Integrity Present or Discovered   [] LDA present in EPIC              [] LDA added in EPIC   [] Wound Image Taken (required on admit,                   transfer/discharge and every Tuesday)    Wound Care Consulted? No    Attending Nurse:     Second RN/Staff Member:    Bowel Function no issues     Indwelling Catheter Necessity         no   De-escalation Antibiotics No       VS and assessment per flow sheet, patient progressing towards goals as tolerated, plan of care reviewed with  patient and family , all concerns addressed, will continue to monitor.

## 2023-12-15 NOTE — PLAN OF CARE
[facility]  Discharge Reassessment    Primary Care Provider: Codi Chavez FNP    Expected Discharge Date: 12/18/2023    Patient not medically ready for discharge per MD.     NORepinephrine bitartrate-D5W 0.1 mcg/kg/min (12/15/23 1006)       Discharge Plan A and Plan B have been determined by review of patient's clinical status, future medical and therapeutic needs, and coverage/benefits for post-acute care in coordination with multidisciplinary team members.          Reassessment (most recent)       Discharge Reassessment - 12/15/23 1022          Discharge Reassessment    Assessment Type Discharge Planning Reassessment     Did the patient's condition or plan change since previous assessment? No     Discharge Plan discussed with: Spouse/sig other     Name(s) and Number(s) Adela Benjamin (spouse) [phone]     Communicated VERA with patient/caregiver Date not available/Unable to determine     Discharge Plan A Home with family     Discharge Plan B Home with family     DME Needed Upon Discharge  other (see comments)   TBD    Transition of Care Barriers Underinsured     Why the patient remains in the hospital Requires continued medical care        Post-Acute Status    Discharge Delays None known at this time                   Dimple Francois RN     [phone]

## 2023-12-15 NOTE — ASSESSMENT & PLAN NOTE
Pt with reflux and hiccups likely 2/t esophageal carcinoma and large volume ascites with stomach compression  -- Chlorpromazine 25mg IV q8hr PRN for uncomfortable hiccups/nausea

## 2023-12-15 NOTE — PROGRESS NOTES
[facility]  Critical Care Medicine  Progress Note    Patient Name: David Benjamin Jr.  MRN: [id number]  Admission Date: 12/12/2023  Hospital Length of Stay: 2 days  Code Status: DNR  Attending Provider: Holger Brasher*  Primary Care Provider: Codi Chavez FNP   Principal Problem: Septic shock    Subjective:     HPI:  Mr. Benjamin is a 57yo M with Pmhx of recently diagnosed neuroendocrine stomach mass, with recent PEG placement at OSH, presenting with complaint of difficulty using his PEG. Also complaining of abdominal pain, distention, and drainage at the PEG site. These problems have been progressively worsening over the last week since the PEG was placed. In the ED, he was found to be hypotensive, tachycardic, tachypneic, with an elevated WBC count and PERLA. Given sepsis fluid bolus, CTX, Flagyl and started on Levophed for persistent hypotension. CT Abd/pelvis performed without evidence of abscess or malpositioned PEG tube. Mass and metastatic lesions present as well as moderate ascites. Admitted to MICU with concerns for septic shock requiring vasopressors.     Hospital/ICU Course:  Pt continued on peripheral vasopressors. Gen Surg consulted for problematoc PEG tube, appreciate recs. PEG tube noted to be in appropriate position with gastric lumen is compressed. PEG aspiration likely 2/t position against posterior wall of the stomach. Gen Surg recommended not to use PEG for tube feeds, but will need to remain in place for 6 weeks s/p placement, prior to consideration for removal. Peripheral norepinephrine weaned accordingly. Pt advanced to full liquid diet and tolerating without aspiration. Pain management adjusted to 12.5mcg with fentanyl patch. Oxycodone 5mg PO soln PRN for mod breakthru pain. Hydromorphone 0.5mg IV PRN for severe breakthru pain. Pt continued on IVF in setting of PERLA.    Interval History/Significant Events: No acute events overnight. Pt tolerating full  liquid diet without difficulty. Vasopressors weaned as tolerated.    Review of Systems   Constitutional:  Positive for activity change, fatigue and unexpected weight change.   HENT:  Negative for sore throat.    Eyes:  Negative for visual disturbance.   Respiratory: Negative.     Cardiovascular: Negative.    Gastrointestinal:  Positive for abdominal distention and abdominal pain. Negative for constipation, diarrhea, nausea and vomiting.   Endocrine: Negative for polyuria.   Genitourinary: Negative.    Musculoskeletal: Negative.  Negative for arthralgias.   Skin:  Negative for color change.   Neurological:  Negative for weakness, light-headedness and headaches.     Objective:     Vital Signs (Most Recent):  Temp: 97.3 °F (36.3 °C) (12/14/23 1500)  Pulse: 94 (12/14/23 1600)  Resp: (Abnormal) 24 (12/14/23 1600)  BP: (Abnormal) 99/57 (12/14/23 1600)  SpO2: 95 % (12/14/23 1600) Vital Signs (24h Range):  Temp:  [97.2 °F (36.2 °C)-98.3 °F (36.8 °C)] 97.3 °F (36.3 °C)  Pulse:  [] 94  Resp:  [14-28] 24  SpO2:  [92 %-100 %] 95 %  BP: ()/(50-62) 99/57   Weight: 108 kg (238 lb 1.6 oz)  Body mass index is 31.41 kg/m².      Intake/Output Summary (Last 24 hours) at 12/14/2023 1653  Last data filed at 12/14/2023 1600  Gross per 24 hour   Intake 3533.77 ml   Output 870 ml   Net 2663.77 ml          Physical Exam  Vitals reviewed.   Constitutional:       General: He is not in acute distress.     Appearance: He is ill-appearing. He is not toxic-appearing.   HENT:      Nose: Nose normal.      Mouth/Throat:      Mouth: Mucous membranes are moist.      Pharynx: Oropharynx is clear.   Eyes:      Extraocular Movements: Extraocular movements intact.      Conjunctiva/sclera: Conjunctivae normal.   Cardiovascular:      Rate and Rhythm: Normal rate and regular rhythm.      Pulses:           Radial pulses are 2+ on the right side and 2+ on the left side.        Dorsalis pedis pulses are 2+ on the right side and 2+ on the left side.  "     Heart sounds: No murmur heard.  Pulmonary:      Effort: Pulmonary effort is normal. No respiratory distress.      Breath sounds: No wheezing.   Abdominal:      General: Bowel sounds are normal. There is distension.      Tenderness: There is generalized abdominal tenderness. There is no right CVA tenderness or guarding. Negative signs include Sanford's sign.      Comments: PEG tube in LUQ   Musculoskeletal:      Cervical back: No tenderness.      Right lower leg: No edema.      Left lower leg: No edema.   Skin:     Capillary Refill: Capillary refill takes less than 2 seconds.      Coloration: Skin is not jaundiced or pale.   Neurological:      Mental Status: He is alert and oriented to person, place, and time. Mental status is at baseline.            Vents:     Lines/Drains/Airways       Drain       Name Duration         Gastrostomy/Enterostomy 12/12/23 2053 LUQ 1 day              Peripheral Intravenous Line       Name Duration         Peripheral IV - Single Lumen 12/13/23 0945 16 G Anterior;Distal;Left Antecubital 1 day         Peripheral IV - Single Lumen 12/13/23 2112 18 G Anterior;Left Upper Arm <1 day                  Significant Labs:    CBC/Anemia Profile:  Recent Labs   Lab 12/13/23  0320 12/14/23  0303   WBC 18.75* 14.50*   HGB 9.7* 9.2*   HCT 29.8* 27.5*    277   MCV 84 84   RDW 14.6* 14.3        Chemistries:  Recent Labs   Lab 12/13/23  0320 12/14/23  0303   * 129*   K 5.4* 5.0   CL 95 98   CO2 23 19*   BUN 59* 51*   CREATININE 1.5* 1.3   CALCIUM 8.3* 8.0*   ALBUMIN 2.0* 1.8*   PROT 5.4* 4.9*   BILITOT 0.3 0.2   ALKPHOS 57 55   ALT 11 9*   AST 33 37   MG 2.5 2.5   PHOS 4.7* 4.1       All pertinent labs within the past 24 hours have been reviewed.    Significant Imaging:  I have reviewed all pertinent imaging results/findings within the past 24 hours.    ABG  No results for input(s): "PH", "PO2", "PCO2", "HCO3", "BE" in the last 168 hours.  Assessment/Plan:     Pulmonary  Hiccups  Pt with " reflux and hiccups likely 2/t esophageal carcinoma and large volume ascites with stomach compression  -- Chlorpromazine 25mg IV q8hr PRN for uncomfortable hiccups/nausea    Renal/  PERLA (acute kidney injury)  Patient with PERLA on admission, likely pre-renal in etiology in setting of decreased intake with PEG tube  dysfunction.      Cr at admission: 1.7  Cr at baseline: 0.7  Estimated Creatinine Clearance: 77.9 mL/min (based on SCr of 1.3 mg/dL).     -- s/p sepsis fluid bolus in ED  -- Trending CMP  -- Renally dose medications  -- avoid nephrotoxic agents   -- Strict I&O   -- MAP > 65. Vasopressors   -- Hgb > 7    ID  * Septic shock  This patient does have evidence of infective focus  My overall impression is septic shock due to MAP < 65 and SBP < 90.  Source: Abdominal    Antibiotics given- CTX and Flagyl given in ED. Will broaden coverage to Vanc/Zosyn and de-escalate as appropriate.   Antibiotics (72h ago, onward)      Start     Stop Route Frequency Ordered    12/14/23 2100  metronidazole IVPB 500 mg         -- IV Every 12 hours (non-standard times) 12/14/23 1323    12/14/23 0900  vancomycin 1,500 mg in dextrose 5 % (D5W) 250 mL IVPB (Vial-Mate)         -- IV Every 24 hours (non-standard times) 12/14/23 0835    12/13/23 1700  cefTRIAXone (ROCEPHIN) 2 g in dextrose 5 % in water (D5W) 100 mL IVPB (MB+)         -- IV Every 24 hours (non-standard times) 12/13/23 0034    12/13/23 0100  vancomycin 1,500 mg in dextrose 5 % (D5W) 250 mL IVPB (Vial-Mate)         -- IV Once 12/12/23 2359    12/13/23 0041  vancomycin - pharmacy to dose  (vancomycin IVPB (PEDS and ADULTS))        See Hyperspace for full Linked Orders Report.    -- IV pharmacy to manage frequency 12/12/23 2342          Latest lactate reviewed-  Recent Labs   Lab 12/12/23  1639   POCLAC 1.79       Organ dysfunction indicated by Acute kidney injury    Fluid challenge Actual Body weight- Patient will receive 30ml/kg actual body weight to calculate fluid bolus for  "treatment of septic shock.     Will Start Pressors- Levophed for MAP of 65  Source control achieved by: Abx.     -- Continue BSA   -- Follow up blood cultures   -- Diagnostic para at bedside. Follow up cultures  -- Vasopressors for MAP > 65      Oncology  Cancer related pain  Analgesia rpescribed for cancer related pain in setting of dysphagia  -- Fentanyl 12.5mcg patch  -- Oxycodone 5mg q2h PRN for moderate pain  -- Dilaudid 0.5mg q4h PRN for severe pain    Primary neuroendocrine carcinoma of stomach  Follows in [facility] clinic with Dr. Lopez. Plan for chemo initiation this month as below.   12/18/2023:       Chemotherapy       CARBOplatin (PARAPLATIN) in sodium chloride 0.9% 250 mL chemo infusion       etoposide (VEPESID) 100 mg/m2 = 230 mg in sodium chloride 0.9% 511.5 mL chemo infusion    Per most recent note: "Long discussion with patient and wife re the diagnosis, natural history and prognosis of stage IV gastric NEC. Cancer is not curable but treatable. The goal of treatment is palliative, with hopes of containing the cancer, slowing down growth, and prolonging life."    GI  Ascites  Abdominal pain + distention with ascites noted on CT imaging. Diagnostic paracentesis performed, SBP unlikely per lab results. CTX and Vanc continued in setting of septic shock with vasopressors being weaned accordingly. 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  -- Continue abx as indicated  -- Consider therapeutic para if abdominal pain not controlled  -- PRN analgesia for acute pain      PEG tube malfunction  Patient with PEG tube placed 1 week ago at OSH for dysphagia in setting of stomach/esophageal mass. PEG tube flushed successfully with minimal volume, tube aspiration appreciated. Consult GI vs Gen Surg, appreciate recs. PEG tube correctly placed but balloon is compressed against posterior wall of the stomach. Pt recommended to discontinue use. PEG tube " "exchange will not improve functionality. Pt advanced to full liquid diet and tolerating well.  -- Continue full liquid diet as tolerated    Mass of gastroesophageal junction  Recently discovered mass of stomach/distal esophagus. EGD biopsy diagnosed high grade neuroendocrine tumor.     -- S/p PEG placement approximately 1 week ago at OSH for persistent dysphagia  -- Had PET scan today, demonstrating "Extensive lower thoracic and abdominal lymphadenopathy with smaller lymph nodes of the chest.  There is also extensive peritoneal omental metastases and there are bone metastases."   -- Per record review, saw Dr. Lopez with hematology/oncology today. Plan to start palliative chemotherapy after he recovers from acute illness.     Palliative Care  Palliative care encounter  Insight/goals of care- good insight despite acuity of decline and new diagnosis of incurable cancer. Remains hopeful he can improve to the point where he might benefit from palliative chemo but understands the limitations if he continues to decline. Clear limits on care if he appears to be dying.      Social support- supportive wife and parents (still living) has may friends and strong Anabaptist community in [place]     Psychological- fair coping strategies given gravity of recent developments      Spiritual- Orthodoxy, spiritual needs met by local community     Symptom management- cancer-related pain (previously on tramadol, oxy5/10s), also with hiccups and nausea     Recommendations  -DNR, full support, would consider procedures and vasopressors as long as there remains a reasonable chance of recovery  -will continue to engage on other limits he might place on his care (did not specifically address CELY or HD)  -recommend trial of chlorpromazine 25mg IV q8hr PRN for uncomfortable hiccups/nausea       Critical Care Daily Checklist:    A: Awake: RASS Goal/Actual Goal: RASS Goal: 0-->alert and calm  Actual:     B: Spontaneous Breathing Trial Performed?   "   C: SAT & SBT Coordinated?  N/A                      D: Delirium: CAM-ICU Overall CAM-ICU: Negative   E: Early Mobility Performed? Yes   F: Feeding Goal:    Status:     Current Diet Order   Procedures    Diet full liquid      AS: Analgesia/Sedation Fent/Oxy/Dilaudid   T: Thromboembolic Prophylaxis Enoxaparin   H: HOB > 300 Yes   U: Stress Ulcer Prophylaxis (if needed) PPI   G: Glucose Control SSI   B: Bowel Function Stool Occurrence: 1   I: Indwelling Catheter (Lines & Marcos) Necessity Marcos   D: De-escalation of Antimicrobials/Pharmacotherapies CTX/Azithro    Plan for the day/ETD Wean pressors    Code Status:  Family/Goals of Care: DNR  Palliative Consulted for GOC Discussion       Critical secondary to Patient has a condition that poses threat to life and bodily function: Septic Shock      Critical care was time spent personally by me on the following activities: development of treatment plan with patient or surrogate and bedside caregivers, discussions with consultants, evaluation of patient's response to treatment, examination of patient, ordering and performing treatments and interventions, ordering and review of laboratory studies, ordering and review of radiographic studies, pulse oximetry, re-evaluation of patient's condition. This critical care time did not overlap with that of any other provider or involve time for any procedures.     Bull Palmer MD  Critical Care Medicine  [facility] - Cardiac Medical ICU

## 2023-12-15 NOTE — PROGRESS NOTES
[facility]  Palliative Medicine  Progress Note    Patient Name: David Benjamin Jr.  MRN: [id number]  Admission Date: 12/12/2023  Hospital Length of Stay: 3 days  Code Status: DNR   Attending Provider: Holger Brasher*  Consulting Provider: Rafal Heller MD  Primary Care Physician: Codi Chavez FNP  Principal Problem:Septic shock    Patient information was obtained from patient, spouse/SO, past medical records, and primary team.      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 Pain is primarily aching, stabbing, occasionally burning. Had acceptable control prior to admit with oxycodone 5mg using 2-3x per day, now with acute worsening of baseline pain    12/15  125 OME> ~60    Recommendations  -patient tolerating oral liquids for now, notes was tolerating small pills (senna, oxycodone prior to admit)  -would increase oxycodone oral liquid to 7.5mg q4hr PRN for pain, dyspnea, or pain behaviors  -continue hydromorphone 0.5mg IV for severe breakthrough pain  -while not ideal for dose finding in the acute setting, can continue fentanyl patch 12mcg/72hrs  -would also start senna + miralax as tolerated to prevent OIC    Primary neuroendocrine carcinoma of stomach  58 year old male with recent diagnosis of now metastatic neuroendocrine carcinoma. Presumed gastric in origin but no distinguishing primary features on path. Admitted for presumed septic shock. Independent prior to this decline but had recent PEG tube due to dysphagia. Planned to initiate carbo/etoposide with Dr Lopez prior to this admit.     -unclear if patient will recover from this acute episode well enough to tolerate chemo  -reasonable to hope for that at this time, but understands he could remain too ill to benefit  -recommend clear and direct communication regarding  prognosis and treatment options as course unfolds  -unclear what patient's plan for enteral access will be if structural/mechanical dysphagia progresses   -could be a good candidate alternative access (j tube) pending clinical course which will likely shape overall prognosis    Palliative Care  Palliative care encounter  See ACP 12/13-12/14    Insight/goals of care- good insight despite acuity of decline and new diagnosis of incurable cancer. Remains hopeful he can improve to the point where he might benefit from palliative chemo but understands the limitations if he continues to decline. Clear limits on care if he appears to be dying.     Social support- supportive wife and parents (still living) has may friends and strong Islam community in [place]    Psychological- fair coping strategies given gravity of recent developments     Spiritual- Faith, spiritual needs met by local community    Symptom management- cancer-related pain (previously on tramadol, oxy5/10s), also with hiccups and nausea    Recommendations  -DNR, full support, would consider procedures and vasopressors as long as there remains a reasonable chance of recovery  -will continue to engage on other limits he might place on his care (did not specifically address CELY or HD)  -continue chlorpromazine 25mg IV q8hr PRN for uncomfortable hiccups/nausea        Will not plan to formally see over the weekend. Please page palliative care provider on call with any urgent issues. If patient still admitted on 12/18, will follow-up      Subjective:     Chief Complaint:   Chief Complaint   Patient presents with    Multiple complaints     Sent from [place] , not on chemo at present, abnormal kidney function, need to check feeding tube       HPI:   Mr. Benjamin is a 58 year old male with ercently diagnosed neuroendocrine gastric cancer with rapid progression including widely metastatic disease (ribs, sacrum, omentum/peritoneal, thoracic and abdominal LN) on  recent PET 12/12, who presented the same day from oncology clinic with generalized weakness and malfunctioning PEG tube (recently placed at OSH).  He was found to be hypotensive, tachycardic, tachypneic, with an elevated WBC count and PERLA. Admitted to MICU, started on antibiotics and vasopressors. Palliative care consulted to discuss goals of care and assist with symptom management    Hospital Course:  No notes on file    Interval History: Chart reviewed including 24h medication use. Patient seen at bedside with wife present during visit. Vasopressor needs increased despite continued IV fluids and abx. Unclear cause of hypotension. Patient tolerating PO meds    Pain meds past 24hrs: oxycodone 5mg x5 (~38 OME), hydromorphone 0.5mg IV x4 (40 OME) 1mg x1 (20 OME), fentanyl patch 12mcg (~25 OME)    Medications:  Continuous Infusions:   NORepinephrine bitartrate-D5W 0.1 mcg/kg/min (12/15/23 1400)     Scheduled Meds:   cefTRIAXone (ROCEPHIN) IVPB  2 g Intravenous Q24H    enoxparin  40 mg Subcutaneous Q24H (prophylaxis, 1700)    fentaNYL  1 patch Transdermal Q72H    metronidazole  500 mg Intravenous Q12H    mupirocin   Nasal BID    vancomycin (VANCOCIN) IV (PEDS and ADULTS)  15 mg/kg Intravenous Once    vancomycin (VANCOCIN) IV (PEDS and ADULTS)  1,500 mg Intravenous Q24H     PRN Meds:chlorproMAZINE, dextrose 10%, dextrose 10%, glucagon (human recombinant), HYDROmorphone, insulin aspart U-100, LIDOcaine, OLANZapine, ondansetron, oxyCODONE, sodium chloride 0.9%, Pharmacy to dose Vancomycin consult **AND** vancomycin - pharmacy to dose    Objective:     Vital Signs (Most Recent):  Temp: 96.5 °F (35.8 °C) (12/15/23 1100)  Pulse: 100 (12/15/23 1415)  Resp: 15 (12/15/23 1455)  BP: (!) 89/55 (12/15/23 1415)  SpO2: (!) 94 % (12/15/23 1400) Vital Signs (24h Range):  Temp:  [96.5 °F (35.8 °C)-97.4 °F (36.3 °C)] 96.5 °F (35.8 °C)  Pulse:  [] 100  Resp:  [13-30] 15  SpO2:  [92 %-96 %] 94 %  BP: ()/(50-62) 89/55  "    Weight: 108 kg (238 lb)  Body mass index is 31.4 kg/m².       Physical Exam  Vitals and nursing note reviewed.   Constitutional:       Appearance: He is ill-appearing.      Comments: Older, ill-appearing male lying in bed in no acute distress, awake and conversant; active hiccups but otherwise minimal pain behaviors   HENT:      Mouth/Throat:      Mouth: Mucous membranes are moist.   Eyes:      General: No scleral icterus.     Extraocular Movements: Extraocular movements intact.   Pulmonary:      Effort: Pulmonary effort is normal. No respiratory distress.   Abdominal:      General: Abdomen is flat.   Skin:     General: Skin is warm and dry.   Neurological:      General: No focal deficit present.      Mental Status: He is oriented to person, place, and time.   Psychiatric:         Mood and Affect: Mood normal.         Behavior: Behavior normal.         Thought Content: Thought content normal.         Judgment: Judgment normal.         CBC:   Recent Labs   Lab 12/15/23  0251   WBC 16.55*   HGB 9.9*   HCT 29.8*   MCV 87        BMP:  Recent Labs   Lab 12/15/23  0251   *   *   K 4.6   CL 96   CO2 18*   BUN 41*   CREATININE 1.3   CALCIUM 7.6*   MG 2.3     LFT:  Lab Results   Component Value Date    AST 33 12/15/2023    ALKPHOS 54 (L) 12/15/2023    BILITOT 0.2 12/15/2023     Albumin:   Albumin   Date Value Ref Range Status   12/15/2023 1.8 (L) 3.5 - 5.2 g/dL Final     Protein:   Total Protein   Date Value Ref Range Status   12/15/2023 4.9 (L) 6.0 - 8.4 g/dL Final     Lactic acid:   No results found for: "LACTATE"    Reviewed CBC with stable blood counts and CMP with resolving PERLA      In my care of this patient with acute on chronic severe illness with threat to life and/or bodily function, I am recommending goal-concordant care as noted above. I spent a significant amount of time reviewing external records/ recommendations of other providers, reviewing recent test results (CBC, CMP), and discussed " care with other subspecialists involved (MICU)    The above recommendations communicated directly to primary team on 12/15      Rafal Heller MD  Palliative Medicine  [facility] - Cardiac Medical ICU

## 2023-12-15 NOTE — ASSESSMENT & PLAN NOTE
Insight/goals of care- good insight despite acuity of decline and new diagnosis of incurable cancer. Remains hopeful he can improve to the point where he might benefit from palliative chemo but understands the limitations if he continues to decline. Clear limits on care if he appears to be dying.      Social support- supportive wife and parents (still living) has may friends and strong Sikh community in [place]     Psychological- fair coping strategies given gravity of recent developments      Spiritual- Mandaen, spiritual needs met by local community     Symptom management- cancer-related pain (previously on tramadol, oxy5/10s), also with hiccups and nausea     Recommendations  -DNR, full support, would consider procedures and vasopressors as long as there remains a reasonable chance of recovery  -will continue to engage on other limits he might place on his care (did not specifically address CELY or HD)  -recommend trial of chlorpromazine 25mg IV q8hr PRN for uncomfortable hiccups/nausea

## 2023-12-15 NOTE — PLAN OF CARE
Recommendations    1. Continue full liquid diet, advance diet per MD      2. Rec'd ONS [employer] standard 1.4 (vanilla) x 5 cans per day to provide 2275 kcal, 100g pro, 1170ml FW      3. RD to monitor and follow    Goals: Meet % EEN, EPN by RD f/u  Nutrition Goal Status: new  Communication of RD Recs:  (POC)

## 2023-12-15 NOTE — SUBJECTIVE & OBJECTIVE
Interval History: Chart reviewed including 24h medication use. Patient seen at bedside with wife present during visit. Vasopressor needs increased despite continued IV fluids and abx. Unclear cause of hypotension. Patient tolerating PO meds    Pain meds past 24hrs: oxycodone 5mg x5 (~38 OME), hydromorphone 0.5mg IV x4 (40 OME) 1mg x1 (20 OME), fentanyl patch 12mcg (~25 OME)    Medications:  Continuous Infusions:   NORepinephrine bitartrate-D5W 0.1 mcg/kg/min (12/15/23 1400)     Scheduled Meds:   cefTRIAXone (ROCEPHIN) IVPB  2 g Intravenous Q24H    enoxparin  40 mg Subcutaneous Q24H (prophylaxis, 1700)    fentaNYL  1 patch Transdermal Q72H    metronidazole  500 mg Intravenous Q12H    mupirocin   Nasal BID    vancomycin (VANCOCIN) IV (PEDS and ADULTS)  15 mg/kg Intravenous Once    vancomycin (VANCOCIN) IV (PEDS and ADULTS)  1,500 mg Intravenous Q24H     PRN Meds:chlorproMAZINE, dextrose 10%, dextrose 10%, glucagon (human recombinant), HYDROmorphone, insulin aspart U-100, LIDOcaine, OLANZapine, ondansetron, oxyCODONE, sodium chloride 0.9%, Pharmacy to dose Vancomycin consult **AND** vancomycin - pharmacy to dose    Objective:     Vital Signs (Most Recent):  Temp: 96.5 °F (35.8 °C) (12/15/23 1100)  Pulse: 100 (12/15/23 1415)  Resp: 15 (12/15/23 1455)  BP: (!) 89/55 (12/15/23 1415)  SpO2: (!) 94 % (12/15/23 1400) Vital Signs (24h Range):  Temp:  [96.5 °F (35.8 °C)-97.4 °F (36.3 °C)] 96.5 °F (35.8 °C)  Pulse:  [] 100  Resp:  [13-30] 15  SpO2:  [92 %-96 %] 94 %  BP: ()/(50-62) 89/55     Weight: 108 kg (238 lb)  Body mass index is 31.4 kg/m².       Physical Exam  Vitals and nursing note reviewed.   Constitutional:       Appearance: He is ill-appearing.      Comments: Older, ill-appearing male lying in bed in no acute distress, awake and conversant; active hiccups but otherwise minimal pain behaviors   HENT:      Mouth/Throat:      Mouth: Mucous membranes are moist.   Eyes:      General: No scleral icterus.      "Extraocular Movements: Extraocular movements intact.   Pulmonary:      Effort: Pulmonary effort is normal. No respiratory distress.   Abdominal:      General: Abdomen is flat.   Skin:     General: Skin is warm and dry.   Neurological:      General: No focal deficit present.      Mental Status: He is oriented to person, place, and time.   Psychiatric:         Mood and Affect: Mood normal.         Behavior: Behavior normal.         Thought Content: Thought content normal.         Judgment: Judgment normal.         CBC:   Recent Labs   Lab 12/15/23  0251   WBC 16.55*   HGB 9.9*   HCT 29.8*   MCV 87        BMP:  Recent Labs   Lab 12/15/23  0251   *   *   K 4.6   CL 96   CO2 18*   BUN 41*   CREATININE 1.3   CALCIUM 7.6*   MG 2.3     LFT:  Lab Results   Component Value Date    AST 33 12/15/2023    ALKPHOS 54 (L) 12/15/2023    BILITOT 0.2 12/15/2023     Albumin:   Albumin   Date Value Ref Range Status   12/15/2023 1.8 (L) 3.5 - 5.2 g/dL Final     Protein:   Total Protein   Date Value Ref Range Status   12/15/2023 4.9 (L) 6.0 - 8.4 g/dL Final     Lactic acid:   No results found for: "LACTATE"    "

## 2023-12-15 NOTE — ASSESSMENT & PLAN NOTE
Abdominal pain + distention with ascites noted on CT imaging. Diagnostic paracentesis performed, SBP unlikely per lab results. CTX and Vanc continued in setting of septic shock with vasopressors being weaned accordingly. 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  -- Continue abx as indicated  -- Consider therapeutic para if abdominal pain not controlled  -- PRN analgesia for acute pain

## 2023-12-15 NOTE — PLAN OF CARE
MICU DAILY GOALS     Family/Goals of care/Code Status   Code Status: DNR    24H Vital Sign Range  Temp:  [97.1 °F (36.2 °C)-97.4 °F (36.3 °C)]   Pulse:  []   Resp:  [13-27]   BP: ()/(50-62)   SpO2:  [92 %-98 %]      Shift Events (include procedures and significant events)  No acute events over night. Remains on Levo. C/o pain throughout shift; PRN oxycodone and dilaudid given as needed as appropriate for pain management.    AWAKE RASS: Goal - RASS Goal: 0-->alert and calm  Actual - RASS (Estrada Agitation-Sedation Scale): alert and calm    Restraint necessity: Not necessary   BREATHE SBT: Not intubated    Coordinate A & B, analgesics/sedatives Pain: managed   SAT: Not intubated   Delirium CAM-ICU: Overall CAM-ICU: Negative   Early(intubated/ Progressive (non-intubated) Mobility MOVE Screen (INTUBATED ONLY): Not intubated    Activity: Activity Management: Rolling - L1, Arm raise - L1   Feeding/Nutrition Diet order: Diet/Nutrition Received: full liquid,     Thrombus DVT prophylaxis: VTE Required Core Measure: Pharmacological prophylaxis initiated/maintained   HOB Elevation Head of Bed (HOB) Positioning: HOB at 30-45 degrees   Ulcer Prophylaxis GI: yes   Glucose control managed Glycemic Management: blood glucose monitored   Skin Skin assessed during: Daily Assessment    Sacrum intact/not altered? Yes  Heels intact/not altered? Yes  Surgical wound? No    Check one (no altered skin or altered skin) and sub boxes:  [x] No Altered Skin Integrity Present    [x]Prevention Measures Documented    [] Altered Skin Integrity Present or Discovered   [] LDA present in EPIC              [] LDA added in EPIC   [] Wound Image Taken (required on admit,                   transfer/discharge and every Tuesday)    Wound Care Consulted? No    Attending Nurse: Madhu Simpson RN    Second RN/Staff Member: Jacquelin Almazan RN   Bowel Function no issues    Indwelling Catheter Necessity         De-escalation Antibiotics Yes       VS and  assessment per flow sheet, patient progressing towards goals as tolerated, plan of care reviewed with [unfilled] and family - pt and pt spouse, all concerns addressed at this time.    Madhu Simpson RN

## 2023-12-15 NOTE — CONSULTS
[facility] - [facility]  Adult Nutrition  Consult Note    SUMMARY     Recommendations    1. Continue full liquid diet, advance diet per MD      2. Rec'd ONS [employer] standard 1.4 (vanilla) x 5 cans per day to provide 2275 kcal, 100g pro, 1170ml FW      3. RD to monitor and follow    Goals: Meet % EEN, EPN by RD f/u  Nutrition Goal Status: new  Communication of RD Recs:  (POC)    Assessment and Plan    Endocrine  Moderate malnutrition  Malnutrition Type:  Context: acute illness or injury  Level: moderate    Related to (etiology):   Inadequate energy intake    Signs and Symptoms (as evidenced by):   Malnutrition Characteristic Summary:  Weight Loss (Malnutrition): other (see comments) (10% x 1 month)  Energy Intake (Malnutrition): less than 75% for greater than 7 days  Muscle Mass (Malnutrition): mild depletion    Interventions/Recommendations (treatment strategy):  1. Continue full liquid diet, advance diet per MD    2. Rec'd ONS [employer] standard 1.4 (vanilla) x 5 cans per day to provide 2275 kcal, 100g pro, 1170ml FW     3. RD to monitor and follow    Nutrition Diagnosis Status:   New    Malnutrition Assessment  Malnutrition Context: acute illness or injury  Malnutrition Level: moderate  Skin (Micronutrient): none  Nails (Micronutrient): none  Hair/Scalp (Micronutrient): none  Eyes (Micronutrient): none  Extraoral (Micronutrient): none  Gums (Micronutrient): none  Lips/Mucous Membranes (Micronutrient): none  Teeth (Micronutrient): none  Tongue (Micronutrient): none  Neck/Chest (Micronutrient): muscle wasting  Musculoskeletal/Lower Extremities: muscle wasting       Weight Loss (Malnutrition): other (see comments) (10% x 1 month)  Energy Intake (Malnutrition): less than 75% for greater than 7 days  Muscle Mass (Malnutrition): mild depletion   Orbital Region (Subcutaneous Fat Loss): well nourished  Upper Arm Region (Subcutaneous Fat Loss): well nourished   [place] Region (Muscle Loss): mild  "depletion  Clavicle Bone Region (Muscle Loss): well nourished  Clavicle and Acromion Bone Region (Muscle Loss): well nourished  Dorsal Hand (Muscle Loss): well nourished  Patellar Region (Muscle Loss): well nourished  Anterior Thigh Region (Muscle Loss): well nourished  Posterior Calf Region (Muscle Loss): well nourished     Reason for Assessment    Reason For Assessment: consult  Diagnosis:  (septic shock)  Relevant Medical History: mass of gastroesophageal junction, PERLA, PEG tube malfunction, ascites  Interdisciplinary Rounds: did not attend    General Information Comments:   Pt with neuroendocrine carcinoma with metastatic disease. PEG tube placed 1 week ago but not used due to balloon pushing against gastric wall. Pt tolerating liquid by mouth in small volume at a time. Plan to initiate chemo treatment per MD. Pt was prescribed [employer] 1.4 x 5 cans per day for TF by out pt RD, has plenty of supply at home. Rec'd taking KF by mouth. Rec'd avoid beverage with low nutritional value to maximize intake of high nutrition value liquid. Pt and family verbalized understanding. Stated  lb, recent wt was 225 lb indicated wt loss of 25 lb (10%) x 1month. NFPE completed today, mild muscle depletion noted. Pt meet criteria for severe malnutrition.     Nutrition Discharge Planning: Pending medical course    Nutrition Risk Screen    Nutrition Risk Screen: reduced oral intake over the last month    Nutrition/Diet History    Spiritual, Cultural Beliefs, Religion Practices, Values that Affect Care: no    Anthropometrics    Temp: 96.5 °F (35.8 °C)  Height Method: Stated  Height: 6' 1" (185.4 cm)  Height (inches): 73 in  Weight Method: Bed Scale  Weight: 108 kg (238 lb)  Weight (lb): 238 lb  Ideal Body Weight (IBW), Male: 184 lb  % Ideal Body Weight, Male (lb): 129.35 %  BMI (Calculated): 31.4       Lab/Procedures/Meds    Pertinent Labs Reviewed: reviewed  Pertinent Labs Comments: Na 124, glucose 181, BUN 41, Tpro 4.9, " albumin 1.8, ALP 54  Pertinent Medications Reviewed: reviewed  Pertinent Medications Comments: norepinephrine    Physical Findings/Assessment         Estimated/Assessed Needs    Weight Used For Calorie Calculations: 108 kg (238 lb)  Energy Calorie Requirements (kcal): 1943 kcal  Energy Need Method: [place]-St Jeor  Protein Requirements: 100-125g (1.2-1.5g/kgIBW)  Weight Used For Protein Calculations: 83.5 kg (184 lb) (IBW)  Fluid Requirements (mL): 1ml/kcal or per MD  Estimated Fluid Requirement Method: RDA Method  RDA Method (mL): 1943         Nutrition Prescription Ordered    Current Diet Order: FLD  Oral Nutrition Supplement: Boost Plus    Evaluation of Received Nutrient/Fluid Intake    I/O: + 7 L since admit  Energy Calories Required: not meeting needs  Protein Required: not meeting needs  Comments: LBM 12/14  Tolerance: tolerating      Nutrition Risk    Level of Risk/Frequency of Follow-up:  (1x/week)       Monitor and Evaluation    Food and Nutrient Intake: energy intake, food and beverage intake  Food and Nutrient Adminstration: diet order, enteral and parenteral nutrition administration  Knowledge/Beliefs/Attitudes: beliefs and attitudes  Physical Activity and Function: nutrition-related ADLs and IADLs  Anthropometric Measurements: height/length, weight change, weight, body mass index  Biochemical Data, Medical Tests and Procedures: electrolyte and renal panel, gastrointestinal profile, glucose/endocrine profile, inflammatory profile, lipid profile  Nutrition-Focused Physical Findings: overall appearance       Nutrition Follow-Up    RD Follow-up?: Yes

## 2023-12-15 NOTE — ASSESSMENT & PLAN NOTE
Malnutrition Type:  Context: acute illness or injury  Level: moderate    Related to (etiology):   Inadequate energy intake    Signs and Symptoms (as evidenced by):   Malnutrition Characteristic Summary:  Weight Loss (Malnutrition): other (see comments) (10% x 1 month)  Energy Intake (Malnutrition): less than 75% for greater than 7 days  Muscle Mass (Malnutrition): mild depletion    Interventions/Recommendations (treatment strategy):  1. Continue full liquid diet, advance diet per MD    2. Rec'd ONS [employer] standard 1.4 (vanilla) x 5 cans per day to provide 2275 kcal, 100g pro, 1170ml FW     3. RD to monitor and follow    Nutrition Diagnosis Status:   New

## 2023-12-16 LAB
ALBUMIN SERPL BCP-MCNC: 1.9 G/DL (ref 3.5–5.2)
ALP SERPL-CCNC: 62 U/L (ref 55–135)
ALT SERPL W/O P-5'-P-CCNC: 11 U/L (ref 10–44)
ANION GAP SERPL CALC-SCNC: 13 MMOL/L (ref 8–16)
AST SERPL-CCNC: 39 U/L (ref 10–40)
BACTERIA SPEC AEROBE CULT: NO GROWTH
BASOPHILS # BLD AUTO: 0.03 K/UL (ref 0–0.2)
BASOPHILS NFR BLD: 0.1 % (ref 0–1.9)
BILIRUB SERPL-MCNC: 0.3 MG/DL (ref 0.1–1)
BUN SERPL-MCNC: 42 MG/DL (ref 6–20)
CALCIUM SERPL-MCNC: 8 MG/DL (ref 8.7–10.5)
CHLORIDE SERPL-SCNC: 92 MMOL/L (ref 95–110)
CO2 SERPL-SCNC: 19 MMOL/L (ref 23–29)
CREAT SERPL-MCNC: 1.6 MG/DL (ref 0.5–1.4)
DIFFERENTIAL METHOD: ABNORMAL
EOSINOPHIL # BLD AUTO: 0 K/UL (ref 0–0.5)
EOSINOPHIL NFR BLD: 0 % (ref 0–8)
ERYTHROCYTE [DISTWIDTH] IN BLOOD BY AUTOMATED COUNT: 14.6 % (ref 11.5–14.5)
EST. GFR  (NO RACE VARIABLE): 49 ML/MIN/1.73 M^2
GLUCOSE SERPL-MCNC: 222 MG/DL (ref 70–110)
HCT VFR BLD AUTO: 30.8 % (ref 40–54)
HGB BLD-MCNC: 9.9 G/DL (ref 14–18)
IMM GRANULOCYTES # BLD AUTO: 0.4 K/UL (ref 0–0.04)
IMM GRANULOCYTES NFR BLD AUTO: 1.9 % (ref 0–0.5)
LYMPHOCYTES # BLD AUTO: 0.9 K/UL (ref 1–4.8)
LYMPHOCYTES NFR BLD: 4.5 % (ref 18–48)
MAGNESIUM SERPL-MCNC: 2.4 MG/DL (ref 1.6–2.6)
MCH RBC QN AUTO: 27.6 PG (ref 27–31)
MCHC RBC AUTO-ENTMCNC: 32.1 G/DL (ref 32–36)
MCV RBC AUTO: 86 FL (ref 82–98)
MONOCYTES # BLD AUTO: 0.5 K/UL (ref 0.3–1)
MONOCYTES NFR BLD: 2.2 % (ref 4–15)
NEUTROPHILS # BLD AUTO: 18.7 K/UL (ref 1.8–7.7)
NEUTROPHILS NFR BLD: 91.3 % (ref 38–73)
NRBC BLD-RTO: 0 /100 WBC
PHOSPHATE SERPL-MCNC: 3.7 MG/DL (ref 2.7–4.5)
PLATELET # BLD AUTO: 364 K/UL (ref 150–450)
PMV BLD AUTO: 11.1 FL (ref 9.2–12.9)
POCT GLUCOSE: 172 MG/DL (ref 70–110)
POTASSIUM SERPL-SCNC: 4.7 MMOL/L (ref 3.5–5.1)
PROT SERPL-MCNC: 5.3 G/DL (ref 6–8.4)
RBC # BLD AUTO: 3.59 M/UL (ref 4.6–6.2)
SODIUM SERPL-SCNC: 124 MMOL/L (ref 136–145)
WBC # BLD AUTO: 20.52 K/UL (ref 3.9–12.7)

## 2023-12-16 PROCEDURE — 63600175 PHARM REV CODE 636 W HCPCS

## 2023-12-16 PROCEDURE — 25000003 PHARM REV CODE 250

## 2023-12-16 PROCEDURE — 25000003 PHARM REV CODE 250: Performed by: NURSE PRACTITIONER

## 2023-12-16 PROCEDURE — 94761 N-INVAS EAR/PLS OXIMETRY MLT: CPT

## 2023-12-16 PROCEDURE — 99233 PR SUBSEQUENT HOSPITAL CARE,LEVL III: ICD-10-PCS | Mod: ,,, | Performed by: INTERNAL MEDICINE

## 2023-12-16 PROCEDURE — 25000242 PHARM REV CODE 250 ALT 637 W/ HCPCS

## 2023-12-16 PROCEDURE — 85025 COMPLETE CBC W/AUTO DIFF WBC: CPT | Performed by: NURSE PRACTITIONER

## 2023-12-16 PROCEDURE — 99233 SBSQ HOSP IP/OBS HIGH 50: CPT | Mod: ,,, | Performed by: INTERNAL MEDICINE

## 2023-12-16 PROCEDURE — 84100 ASSAY OF PHOSPHORUS: CPT | Performed by: NURSE PRACTITIONER

## 2023-12-16 PROCEDURE — 83735 ASSAY OF MAGNESIUM: CPT | Performed by: NURSE PRACTITIONER

## 2023-12-16 PROCEDURE — 63600175 PHARM REV CODE 636 W HCPCS: Performed by: NURSE PRACTITIONER

## 2023-12-16 PROCEDURE — 20000000 HC ICU ROOM

## 2023-12-16 PROCEDURE — 80053 COMPREHEN METABOLIC PANEL: CPT | Performed by: NURSE PRACTITIONER

## 2023-12-16 RX ORDER — NOREPINEPHRINE BITARTRATE/D5W 4MG/250ML
0-.2 PLASTIC BAG, INJECTION (ML) INTRAVENOUS CONTINUOUS
Status: DISPENSED | OUTPATIENT
Start: 2023-12-16 | End: 2023-12-17

## 2023-12-16 RX ORDER — HYDROCODONE BITARTRATE AND ACETAMINOPHEN 500; 5 MG/1; MG/1
TABLET ORAL
Status: DISCONTINUED | OUTPATIENT
Start: 2023-12-16 | End: 2023-12-19 | Stop reason: HOSPADM

## 2023-12-16 RX ORDER — OXYCODONE HCL 5 MG/5 ML
5 SOLUTION, ORAL ORAL ONCE
Status: COMPLETED | OUTPATIENT
Start: 2023-12-16 | End: 2023-12-16

## 2023-12-16 RX ORDER — OXYCODONE HCL 5 MG/5 ML
5 SOLUTION, ORAL ORAL
Status: DISCONTINUED | OUTPATIENT
Start: 2023-12-16 | End: 2023-12-17

## 2023-12-16 RX ORDER — NOREPINEPHRINE BITARTRATE/D5W 4MG/250ML
PLASTIC BAG, INJECTION (ML) INTRAVENOUS
Status: COMPLETED
Start: 2023-12-16 | End: 2023-12-17

## 2023-12-16 RX ADMIN — FLUDROCORTISONE ACETATE 100 MCG: 0.1 TABLET ORAL at 08:12

## 2023-12-16 RX ADMIN — METRONIDAZOLE 500 MG: 5 INJECTION, SOLUTION INTRAVENOUS at 08:12

## 2023-12-16 RX ADMIN — NOREPINEPHRINE BITARTRATE 0.12 MCG/KG/MIN: 4 INJECTION, SOLUTION INTRAVENOUS at 05:12

## 2023-12-16 RX ADMIN — OXYCODONE HYDROCHLORIDE 5 MG: 5 SOLUTION ORAL at 04:12

## 2023-12-16 RX ADMIN — METRONIDAZOLE 500 MG: 5 INJECTION, SOLUTION INTRAVENOUS at 09:12

## 2023-12-16 RX ADMIN — MUPIROCIN: 20 OINTMENT TOPICAL at 08:12

## 2023-12-16 RX ADMIN — NOREPINEPHRINE BITARTRATE 0.14 MCG/KG/MIN: 4 INJECTION, SOLUTION INTRAVENOUS at 09:12

## 2023-12-16 RX ADMIN — HYDROCORTISONE SODIUM SUCCINATE 100 MG: 100 INJECTION, POWDER, FOR SOLUTION INTRAMUSCULAR; INTRAVENOUS at 09:12

## 2023-12-16 RX ADMIN — OXYCODONE HYDROCHLORIDE 5 MG: 5 SOLUTION ORAL at 01:12

## 2023-12-16 RX ADMIN — ENOXAPARIN SODIUM 40 MG: 40 INJECTION SUBCUTANEOUS at 05:12

## 2023-12-16 RX ADMIN — OXYCODONE HYDROCHLORIDE 5 MG: 5 SOLUTION ORAL at 10:12

## 2023-12-16 RX ADMIN — CEFTRIAXONE 2 G: 2 INJECTION, POWDER, FOR SOLUTION INTRAMUSCULAR; INTRAVENOUS at 05:12

## 2023-12-16 RX ADMIN — HYDROCORTISONE SODIUM SUCCINATE 100 MG: 100 INJECTION, POWDER, FOR SOLUTION INTRAMUSCULAR; INTRAVENOUS at 05:12

## 2023-12-16 RX ADMIN — NOREPINEPHRINE BITARTRATE 0.12 MCG/KG/MIN: 4 INJECTION, SOLUTION INTRAVENOUS at 01:12

## 2023-12-16 RX ADMIN — OXYCODONE HYDROCHLORIDE 5 MG: 5 SOLUTION ORAL at 05:12

## 2023-12-16 RX ADMIN — HYDROCORTISONE SODIUM SUCCINATE 100 MG: 100 INJECTION, POWDER, FOR SOLUTION INTRAMUSCULAR; INTRAVENOUS at 01:12

## 2023-12-16 RX ADMIN — OXYCODONE HYDROCHLORIDE 5 MG: 5 SOLUTION ORAL at 08:12

## 2023-12-16 RX ADMIN — OXYCODONE HYDROCHLORIDE 5 MG: 5 TABLET ORAL at 01:12

## 2023-12-16 RX ADMIN — NOREPINEPHRINE BITARTRATE 0.12 MCG/KG/MIN: 4 INJECTION, SOLUTION INTRAVENOUS at 12:12

## 2023-12-16 NOTE — ASSESSMENT & PLAN NOTE
Pt has not been able to be weaned off peripheral norepinephrine since arrival in MICU. Increased vasopressor support needed to maintain MAPs in setting of extensive metastatic disease. TTE negative for acute change in cardiac function. Oncology and Palliative Care consulted, appreciate recs.  --Continue vasopressor support as indicated

## 2023-12-16 NOTE — PLAN OF CARE
MICU DAILY GOALS     Family/Goals of care/Code Status   Code Status: DNR    24H Vital Sign Range  Temp:  [96.5 °F (35.8 °C)-97.7 °F (36.5 °C)]   Pulse:  []   Resp:  [13-30]   BP: ()/(48-67)   SpO2:  [91 %-97 %]      Shift Events (include procedures and significant events)   Pain management and comfort; cont. Vasopressors per mar; lab values and pain communicated with Dr Raymond. Encouraged patient to increase PO intake, specifically nutritional supplements.     AWAKE RASS: Goal - RASS Goal: 0-->alert and calm  Actual - RASS (Estrada Agitation-Sedation Scale): alert and calm    Restraint necessity: Not necessary   BREATHE SBT: Not intubated    Coordinate A & B, analgesics/sedatives Pain: managed   SAT: Not intubated   Delirium CAM-ICU: Overall CAM-ICU: Negative   Early(intubated/ Progressive (non-intubated) Mobility MOVE Screen (INTUBATED ONLY): Not intubated    Activity: Activity Management: Rolling - L1   Feeding/Nutrition Diet order: Diet/Nutrition Received: full liquid,     Thrombus DVT prophylaxis: VTE Required Core Measure: Pharmacological prophylaxis initiated/maintained   HOB Elevation Head of Bed (HOB) Positioning: HOB elevated   Ulcer Prophylaxis GI: yes   Glucose control managed Glycemic Management: blood glucose monitored   Skin Skin assessed during: Daily Assessment    Sacrum intact/not altered? Yes  Heels intact/not altered? Yes  Surgical wound? No    Check one (no altered skin or altered skin) and sub boxes:  [] No Altered Skin Integrity Present    []Prevention Measures Documented    [] Altered Skin Integrity Present or Discovered   [] LDA present in EPIC              [] LDA added in EPIC   [] Wound Image Taken (required on admit,                   transfer/discharge and every Tuesday)    Wound Care Consulted? No    Attending Nurse:     Second RN/Staff Member:    Bowel Function no issues    Indwelling Catheter Necessity       N/a   De-escalation Antibiotics Yes       VS and assessment  per flow sheet, patient progressing towards goals as tolerated, plan of care reviewed with  patient and spouse .

## 2023-12-16 NOTE — SUBJECTIVE & OBJECTIVE
Interval History/Significant Events: Pt continued on peripheral levo overnight for MAPs >65. Pain managed with PO opioids.     Review of Systems   Constitutional:  Positive for activity change and fatigue.   HENT:  Negative for congestion and sore throat.    Eyes:  Negative for visual disturbance.   Respiratory: Negative.  Negative for cough, choking and chest tightness.    Cardiovascular:  Positive for leg swelling. Negative for chest pain.   Gastrointestinal:  Positive for abdominal distention and abdominal pain. Negative for blood in stool, constipation and diarrhea.   Genitourinary: Negative.  Negative for flank pain.   Musculoskeletal: Negative.    Skin:  Positive for pallor.   Neurological:  Negative for weakness and headaches.   Hematological:  Negative for adenopathy.   Psychiatric/Behavioral:  Negative for agitation and behavioral problems.      Objective:     Vital Signs (Most Recent):  Temp: 98 °F (36.7 °C) (12/16/23 1105)  Pulse: 99 (12/16/23 1300)  Resp: (Abnormal) 21 (12/16/23 1344)  BP: (Abnormal) 86/82 (12/16/23 1300)  SpO2: 95 % (12/16/23 1300) Vital Signs (24h Range):  Temp:  [97.5 °F (36.4 °C)-98.3 °F (36.8 °C)] 98 °F (36.7 °C)  Pulse:  [] 99  Resp:  [14-25] 21  SpO2:  [91 %-97 %] 95 %  BP: ()/(48-82) 86/82   Weight: 113 kg (249 lb 1.9 oz)  Body mass index is 32.87 kg/m².      Intake/Output Summary (Last 24 hours) at 12/16/2023 1538  Last data filed at 12/16/2023 1300  Gross per 24 hour   Intake 1857.17 ml   Output 325 ml   Net 1532.17 ml          Physical Exam  Vitals reviewed.   Constitutional:       General: He is not in acute distress.     Appearance: He is ill-appearing. He is not toxic-appearing.   HENT:      Nose: Nose normal.      Mouth/Throat:      Mouth: Mucous membranes are moist.      Pharynx: Oropharynx is clear.   Eyes:      Extraocular Movements: Extraocular movements intact.      Conjunctiva/sclera: Conjunctivae normal.   Cardiovascular:      Rate and Rhythm: Normal  rate and regular rhythm.      Pulses:           Radial pulses are 2+ on the right side and 2+ on the left side.        Dorsalis pedis pulses are 2+ on the right side and 2+ on the left side.      Heart sounds: No murmur heard.  Pulmonary:      Effort: Pulmonary effort is normal. No respiratory distress.      Breath sounds: No wheezing.   Abdominal:      General: Bowel sounds are normal. There is distension.      Tenderness: There is generalized abdominal tenderness. There is no right CVA tenderness, left CVA tenderness, guarding or rebound. Negative signs include Sanford's sign.      Comments: PEG tube in LUQ  Severe abdominal distention  Diffuse abdominal tenderness    Musculoskeletal:         General: Swelling and tenderness present.      Cervical back: No tenderness.      Right lower leg: Edema present.      Left lower leg: Edema present.   Skin:     Capillary Refill: Capillary refill takes less than 2 seconds.      Coloration: Skin is not jaundiced or pale.   Neurological:      Mental Status: He is alert and oriented to person, place, and time. Mental status is at baseline.            Vents:     Lines/Drains/Airways       Drain       Name Duration         Gastrostomy/Enterostomy 12/12/23 2053 LUQ 3 days              Peripheral Intravenous Line       Name Duration         Peripheral IV - Single Lumen 12/13/23 0945 16 G Anterior;Distal;Left Antecubital 3 days         Peripheral IV - Single Lumen 12/13/23 2112 18 G Anterior;Left Upper Arm 2 days         Peripheral IV - Single Lumen 12/16/23 0248 16 G Anterior;Left;Proximal Upper Arm <1 day                  Significant Labs:    CBC/Anemia Profile:  Recent Labs   Lab 12/15/23  0251 12/16/23  0235   WBC 16.55* 20.52*   HGB 9.9* 9.9*   HCT 29.8* 30.8*    364   MCV 87 86   RDW 14.7* 14.6*        Chemistries:  Recent Labs   Lab 12/15/23  0251 12/16/23  0235   * 124*   K 4.6 4.7   CL 96 92*   CO2 18* 19*   BUN 41* 42*   CREATININE 1.3 1.6*   CALCIUM 7.6* 8.0*    ALBUMIN 1.8* 1.9*   PROT 4.9* 5.3*   BILITOT 0.2 0.3   ALKPHOS 54* 62   ALT 10 11   AST 33 39   MG 2.3 2.4   PHOS 3.5 3.7       All pertinent labs within the past 24 hours have been reviewed.    Significant Imaging:  I have reviewed all pertinent imaging results/findings within the past 24 hours.

## 2023-12-16 NOTE — SUBJECTIVE & OBJECTIVE
Interval History/Significant Events: Pt concerned about increasing opioid requirements & increased pressor requirements. TTE negative for acute change in cardiac function. Pt acknowledged that his metastatic disease is severe and will likely be fatal.    Review of Systems   Constitutional:  Positive for activity change and fatigue.   Respiratory: Negative.     Cardiovascular: Negative.    Gastrointestinal:  Positive for abdominal pain. Negative for diarrhea.   Genitourinary: Negative.    Musculoskeletal: Negative.    Neurological:  Negative for weakness.     Objective:     Vital Signs (Most Recent):  Temp: 97.1 °F (36.2 °C) (12/15/23 1500)  Pulse: 94 (12/15/23 1800)  Resp: (Abnormal) 22 (12/15/23 1800)  BP: (Abnormal) 96/55 (12/15/23 1800)  SpO2: 96 % (12/15/23 1800) Vital Signs (24h Range):  Temp:  [96.5 °F (35.8 °C)-97.4 °F (36.3 °C)] 97.1 °F (36.2 °C)  Pulse:  [] 94  Resp:  [13-30] 22  SpO2:  [92 %-96 %] 96 %  BP: ()/(50-62) 96/55   Weight: 108 kg (238 lb)  Body mass index is 31.4 kg/m².      Intake/Output Summary (Last 24 hours) at 12/15/2023 1858  Last data filed at 12/15/2023 1700  Gross per 24 hour   Intake 3034.05 ml   Output 730 ml   Net 2304.05 ml          Physical Exam  Vitals reviewed.   Constitutional:       General: He is not in acute distress.     Appearance: He is ill-appearing. He is not toxic-appearing.   HENT:      Nose: Nose normal.      Mouth/Throat:      Mouth: Mucous membranes are moist.      Pharynx: Oropharynx is clear.   Eyes:      Extraocular Movements: Extraocular movements intact.      Conjunctiva/sclera: Conjunctivae normal.   Cardiovascular:      Rate and Rhythm: Normal rate and regular rhythm.      Pulses:           Radial pulses are 2+ on the right side and 2+ on the left side.        Dorsalis pedis pulses are 2+ on the right side and 2+ on the left side.      Heart sounds: No murmur heard.  Pulmonary:      Effort: Pulmonary effort is normal. No respiratory distress.       Breath sounds: No wheezing.   Abdominal:      General: Bowel sounds are normal. There is distension.      Tenderness: There is generalized abdominal tenderness. There is no right CVA tenderness or guarding. Negative signs include Sanford's sign.      Comments: PEG tube in LUQ   Musculoskeletal:      Cervical back: No tenderness.      Right lower leg: No edema.      Left lower leg: No edema.   Skin:     Capillary Refill: Capillary refill takes less than 2 seconds.      Coloration: Skin is not jaundiced or pale.   Neurological:      Mental Status: He is alert and oriented to person, place, and time. Mental status is at baseline.            Vents:     Lines/Drains/Airways       Drain       Name Duration         Gastrostomy/Enterostomy 12/12/23 2053 LUQ 2 days              Peripheral Intravenous Line       Name Duration         Peripheral IV - Single Lumen 12/13/23 0945 16 G Anterior;Distal;Left Antecubital 2 days         Peripheral IV - Single Lumen 12/13/23 2112 18 G Anterior;Left Upper Arm 1 day                  Significant Labs:    CBC/Anemia Profile:  Recent Labs   Lab 12/14/23  0303 12/15/23  0251   WBC 14.50* 16.55*   HGB 9.2* 9.9*   HCT 27.5* 29.8*    297   MCV 84 87   RDW 14.3 14.7*        Chemistries:  Recent Labs   Lab 12/14/23  0303 12/15/23  0251   * 124*   K 5.0 4.6   CL 98 96   CO2 19* 18*   BUN 51* 41*   CREATININE 1.3 1.3   CALCIUM 8.0* 7.6*   ALBUMIN 1.8* 1.8*   PROT 4.9* 4.9*   BILITOT 0.2 0.2   ALKPHOS 55 54*   ALT 9* 10   AST 37 33   MG 2.5 2.3   PHOS 4.1 3.5       All pertinent labs within the past 24 hours have been reviewed.    Significant Imaging:  I have reviewed all pertinent imaging results/findings within the past 24 hours.

## 2023-12-16 NOTE — PLAN OF CARE
MICU DAILY GOALS     Family/Goals of care/Code Status   Code Status: DNR    24H Vital Sign Range  Temp:  [96.5 °F (35.8 °C)-97.4 °F (36.3 °C)]   Pulse:  []   Resp:  [13-30]   BP: ()/(50-62)   SpO2:  [92 %-96 %]      Shift Events (include procedures and significant events)   No acute events throughout shift, echo completed- EF of 60-65%; pain meds decreased per patient and wife request to see if blood pressure improves; dietician evaluated patient and ordered nutritional shake; levo infusing at .12; vitals stable; WCTM.    AWAKE RASS: Goal - RASS Goal: 0-->alert and calm  Actual - RASS (Estrada Agitation-Sedation Scale): alert and calm    Restraint necessity: Not necessary   BREATHE SBT: Not intubated    Coordinate A & B, analgesics/sedatives Pain: managed   SAT: Not intubated   Delirium CAM-ICU: Overall CAM-ICU: Negative   Early(intubated/ Progressive (non-intubated) Mobility MOVE Screen (INTUBATED ONLY): Not intubated    Activity: Activity Management: Arm raise - L1   Feeding/Nutrition Diet order: Diet/Nutrition Received: full liquid,     Thrombus DVT prophylaxis: VTE Required Core Measure: Pharmacological prophylaxis initiated/maintained   HOB Elevation Head of Bed (HOB) Positioning: HOB at 30-45 degrees   Ulcer Prophylaxis GI: yes   Glucose control managed Glycemic Management: blood glucose monitored   Skin Skin assessed during: Daily Assessment    Sacrum intact/not altered? Yes  Heels intact/not altered? Yes  Surgical wound? Yes    Check one (no altered skin or altered skin) and sub boxes:  [] No Altered Skin Integrity Present    []Prevention Measures Documented    [] Altered Skin Integrity Present or Discovered   [] LDA present in EPIC              [] LDA added in EPIC   [] Wound Image Taken (required on admit,                   transfer/discharge and every Tuesday)    Wound Care Consulted? No    Attending Nurse:     Second RN/Staff Member:    Bowel Function no issues    Indwelling Catheter  Necessity         no   De-escalation Antibiotics No       VS and assessment per flow sheet, patient progressing towards goals as tolerated, plan of care reviewed with  patient and family , all concerns addressed, will continue to monitor.

## 2023-12-16 NOTE — PROGRESS NOTES
[facility]  Critical Care Medicine  Progress Note    Patient Name: David Benjamin Jr.  MRN: [id number]  Admission Date: 12/12/2023  Hospital Length of Stay: 4 days  Code Status: DNR  Attending Provider: Holger Brasher*  Primary Care Provider: Codi Chavez FNP   Principal Problem: Septic shock    Subjective:     HPI:  Mr. Benjamin is a 59yo M with Pmhx of recently diagnosed neuroendocrine stomach mass, with recent PEG placement at OSH, presenting with complaint of difficulty using his PEG. Also complaining of abdominal pain, distention, and drainage at the PEG site. These problems have been progressively worsening over the last week since the PEG was placed. In the ED, he was found to be hypotensive, tachycardic, tachypneic, with an elevated WBC count and PERLA. Given sepsis fluid bolus, CTX, Flagyl and started on Levophed for persistent hypotension. CT Abd/pelvis performed without evidence of abscess or malpositioned PEG tube. Mass and metastatic lesions present as well as moderate ascites. Admitted to MICU with concerns for septic shock requiring vasopressors.     Hospital/ICU Course:  Pt continued on peripheral vasopressors. Gen Surg consulted for problematoc PEG tube, appreciate recs. PEG tube noted to be in appropriate position with gastric lumen is compressed. PEG aspiration likely 2/t position against posterior wall of the stomach. Gen Surg recommended not to use PEG for tube feeds, but will need to remain in place for 6 weeks s/p placement, prior to consideration for removal. Peripheral norepinephrine weaned accordingly. Pt advanced to full liquid diet and tolerating without aspiration. Pain management adjusted to 12.5mcg with fentanyl patch. Oxycodone 5mg PO soln PRN for mod breakthru pain. Hydromorphone 0.5mg IV PRN for severe breakthru pain. Pt continued on IVF in setting of PERLA, Scr improving. Pt more vasopressor support to maintain MAPs > 65. Adjusted analgesics to PO  meds such that they could be prescribed on discharge. Pt is hypervolemic, +8.5L. Increased abdominal distention with tenderness to palpation noted. Severe ascites present on abdominal US. Consider therapeutic paracentesis.     Interval History/Significant Events: Pt continued on peripheral levo overnight for MAPs >65. Pain managed with PO opioids.     Review of Systems   Constitutional:  Positive for activity change and fatigue.   HENT:  Negative for congestion and sore throat.    Eyes:  Negative for visual disturbance.   Respiratory: Negative.  Negative for cough, choking and chest tightness.    Cardiovascular:  Positive for leg swelling. Negative for chest pain.   Gastrointestinal:  Positive for abdominal distention and abdominal pain. Negative for blood in stool, constipation and diarrhea.   Genitourinary: Negative.  Negative for flank pain.   Musculoskeletal: Negative.    Skin:  Positive for pallor.   Neurological:  Negative for weakness and headaches.   Hematological:  Negative for adenopathy.   Psychiatric/Behavioral:  Negative for agitation and behavioral problems.      Objective:     Vital Signs (Most Recent):  Temp: 98 °F (36.7 °C) (12/16/23 1105)  Pulse: 99 (12/16/23 1300)  Resp: (Abnormal) 21 (12/16/23 1344)  BP: (Abnormal) 86/82 (12/16/23 1300)  SpO2: 95 % (12/16/23 1300) Vital Signs (24h Range):  Temp:  [97.5 °F (36.4 °C)-98.3 °F (36.8 °C)] 98 °F (36.7 °C)  Pulse:  [] 99  Resp:  [14-25] 21  SpO2:  [91 %-97 %] 95 %  BP: ()/(48-82) 86/82   Weight: 113 kg (249 lb 1.9 oz)  Body mass index is 32.87 kg/m².      Intake/Output Summary (Last 24 hours) at 12/16/2023 1538  Last data filed at 12/16/2023 1300  Gross per 24 hour   Intake 1857.17 ml   Output 325 ml   Net 1532.17 ml          Physical Exam  Vitals reviewed.   Constitutional:       General: He is not in acute distress.     Appearance: He is ill-appearing. He is not toxic-appearing.   HENT:      Nose: Nose normal.      Mouth/Throat:       Mouth: Mucous membranes are moist.      Pharynx: Oropharynx is clear.   Eyes:      Extraocular Movements: Extraocular movements intact.      Conjunctiva/sclera: Conjunctivae normal.   Cardiovascular:      Rate and Rhythm: Normal rate and regular rhythm.      Pulses:           Radial pulses are 2+ on the right side and 2+ on the left side.        Dorsalis pedis pulses are 2+ on the right side and 2+ on the left side.      Heart sounds: No murmur heard.  Pulmonary:      Effort: Pulmonary effort is normal. No respiratory distress.      Breath sounds: No wheezing.   Abdominal:      General: Bowel sounds are normal. There is distension.      Tenderness: There is generalized abdominal tenderness. There is no right CVA tenderness, left CVA tenderness, guarding or rebound. Negative signs include Sanford's sign.      Comments: PEG tube in LUQ  Severe abdominal distention  Diffuse abdominal tenderness    Musculoskeletal:         General: Swelling and tenderness present.      Cervical back: No tenderness.      Right lower leg: Edema present.      Left lower leg: Edema present.   Skin:     Capillary Refill: Capillary refill takes less than 2 seconds.      Coloration: Skin is not jaundiced or pale.   Neurological:      Mental Status: He is alert and oriented to person, place, and time. Mental status is at baseline.            Vents:     Lines/Drains/Airways       Drain       Name Duration         Gastrostomy/Enterostomy 12/12/23 2053 LUQ 3 days              Peripheral Intravenous Line       Name Duration         Peripheral IV - Single Lumen 12/13/23 0945 16 G Anterior;Distal;Left Antecubital 3 days         Peripheral IV - Single Lumen 12/13/23 2112 18 G Anterior;Left Upper Arm 2 days         Peripheral IV - Single Lumen 12/16/23 0248 16 G Anterior;Left;Proximal Upper Arm <1 day                  Significant Labs:    CBC/Anemia Profile:  Recent Labs   Lab 12/15/23  0251 12/16/23  0235   WBC 16.55* 20.52*   HGB 9.9* 9.9*   HCT 29.8*  "30.8*    364   MCV 87 86   RDW 14.7* 14.6*        Chemistries:  Recent Labs   Lab 12/15/23  0251 12/16/23  0235   * 124*   K 4.6 4.7   CL 96 92*   CO2 18* 19*   BUN 41* 42*   CREATININE 1.3 1.6*   CALCIUM 7.6* 8.0*   ALBUMIN 1.8* 1.9*   PROT 4.9* 5.3*   BILITOT 0.2 0.3   ALKPHOS 54* 62   ALT 10 11   AST 33 39   MG 2.3 2.4   PHOS 3.5 3.7       All pertinent labs within the past 24 hours have been reviewed.    Significant Imaging:  I have reviewed all pertinent imaging results/findings within the past 24 hours.    ABG  No results for input(s): "PH", "PO2", "PCO2", "HCO3", "BE" in the last 168 hours.  Assessment/Plan:     Pulmonary  Hiccups  Pt with reflux and hiccups likely 2/t esophageal carcinoma and large volume ascites with stomach compression  -- Chlorpromazine 25mg IV q8hr PRN for uncomfortable hiccups/nausea    Cardiac/Vascular  Acute hypotension  Pt has not been able to be weaned off peripheral norepinephrine since arrival in MICU. Increased vasopressor support needed to maintain MAPs in setting of extensive metastatic disease. TTE negative for acute change in cardiac function. Oncology and Palliative Care consulted, appreciate recs.  --Continue vasopressor support as indicated    Renal/  PERLA (acute kidney injury)  Patient with PERLA on admission, likely pre-renal in etiology in setting of decreased intake with PEG tube  dysfunction.      Cr at admission: 1.7  Cr at baseline: 0.7  Estimated Creatinine Clearance: 77.9 mL/min (based on SCr of 1.3 mg/dL).     -- s/p sepsis fluid bolus in ED  -- Trending CMP  -- Renally dose medications  -- avoid nephrotoxic agents   -- Strict I&O   -- MAP > 65. Vasopressors   -- Hgb > 7    ID  * Septic shock  This patient does have evidence of infective focus  My overall impression is septic shock due to MAP < 65 and SBP < 90.  Source: Abdominal    Antibiotics given- CTX and Flagyl given in ED. Will broaden coverage to Vanc/Zosyn and de-escalate as appropriate. " "  Antibiotics (72h ago, onward)      Start     Stop Route Frequency Ordered    12/14/23 2100  metronidazole IVPB 500 mg         -- IV Every 12 hours (non-standard times) 12/14/23 1323    12/14/23 0900  vancomycin 1,500 mg in dextrose 5 % (D5W) 250 mL IVPB (Vial-Mate)         -- IV Every 24 hours (non-standard times) 12/14/23 0835    12/13/23 1700  cefTRIAXone (ROCEPHIN) 2 g in dextrose 5 % in water (D5W) 100 mL IVPB (MB+)         -- IV Every 24 hours (non-standard times) 12/13/23 0034    12/13/23 0100  vancomycin 1,500 mg in dextrose 5 % (D5W) 250 mL IVPB (Vial-Mate)         -- IV Once 12/12/23 2359    12/13/23 0041  vancomycin - pharmacy to dose  (vancomycin IVPB (PEDS and ADULTS))        See Hyperspace for full Linked Orders Report.    -- IV pharmacy to manage frequency 12/12/23 2342          Latest lactate reviewed-  Recent Labs   Lab 12/12/23  1639   POCLAC 1.79       Organ dysfunction indicated by Acute kidney injury    Fluid challenge Actual Body weight- Patient will receive 30ml/kg actual body weight to calculate fluid bolus for treatment of septic shock.     Will Start Pressors- Levophed for MAP of 65  Source control achieved by: Abx.     -- Continue BSA   -- Follow up blood cultures   -- Diagnostic para at bedside. Follow up cultures  -- Vasopressors for MAP > 65      Oncology  Cancer related pain  Analgesia rpescribed for cancer related pain in setting of dysphagia  -- Fentanyl 12.5mcg patch  -- Oxycodone 5mg q2h PRN for moderate pain  -- Dilaudid 0.5mg q4h PRN for severe pain    Primary neuroendocrine carcinoma of stomach  Follows in [facility] clinic with Dr. Lopez. Plan for chemo initiation this month as below.   12/18/2023:       Chemotherapy       CARBOplatin (PARAPLATIN) in sodium chloride 0.9% 250 mL chemo infusion       etoposide (VEPESID) 100 mg/m2 = 230 mg in sodium chloride 0.9% 511.5 mL chemo infusion    Per most recent note: "Long discussion with patient and wife re the diagnosis, natural history and " "prognosis of stage IV gastric NEC. Cancer is not curable but treatable. The goal of treatment is palliative, with hopes of containing the cancer, slowing down growth, and prolonging life."    GI  Ascites  Abdominal pain + distention with ascites noted on CT imaging. Diagnostic paracentesis performed, SBP unlikely per lab results. CTX and Vanc continued in setting of septic shock with vasopressors being weaned accordingly. 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  -- Continue abx as indicated  -- Consider therapeutic para if abdominal pain not controlled  -- PRN analgesia for acute pain      PEG tube malfunction  Patient with PEG tube placed 1 week ago at OSH for dysphagia in setting of stomach/esophageal mass. PEG tube flushed successfully with minimal volume, tube aspiration appreciated. Consult GI vs Gen Surg, appreciate recs. PEG tube correctly placed but balloon is compressed against posterior wall of the stomach. Pt recommended to discontinue use. PEG tube exchange will not improve functionality. Pt advanced to full liquid diet and tolerating well.  -- Continue full liquid diet as tolerated    Mass of gastroesophageal junction  Recently discovered mass of stomach/distal esophagus. EGD biopsy diagnosed high grade neuroendocrine tumor.     -- S/p PEG placement approximately 1 week ago at OSH for persistent dysphagia  -- Had PET scan today, demonstrating "Extensive lower thoracic and abdominal lymphadenopathy with smaller lymph nodes of the chest.  There is also extensive peritoneal omental metastases and there are bone metastases."   -- Per record review, saw Dr. Lopez with hematology/oncology today. Plan to start palliative chemotherapy after he recovers from acute illness.     Palliative Care  Palliative care encounter  Insight/goals of care- good insight despite acuity of decline and new diagnosis of incurable cancer. Remains hopeful he " can improve to the point where he might benefit from palliative chemo but understands the limitations if he continues to decline. Clear limits on care if he appears to be dying.      Social support- supportive wife and parents (still living) has may friends and strong [employer] community in [place]     Psychological- fair coping strategies given gravity of recent developments      Spiritual- Bahai, spiritual needs met by local community     Symptom management- cancer-related pain (previously on tramadol, oxy5/10s), also with hiccups and nausea     Recommendations  -DNR, full support, would consider procedures and vasopressors as long as there remains a reasonable chance of recovery  -will continue to engage on other limits he might place on his care (did not specifically address CELY or HD)  -recommend trial of chlorpromazine 25mg IV q8hr PRN for uncomfortable hiccups/nausea         Critical Care Daily Checklist:     A: Awake: RASS Goal/Actual Goal: RASS Goal: 0-->alert and calm  Actual:     B: Spontaneous Breathing Trial Performed?     C: SAT & SBT Coordinated?  N/A                      D: Delirium: CAM-ICU Overall CAM-ICU: Negative   E: Early Mobility Performed? Yes   F: Feeding Goal:    Status:           Current Diet Order   Procedures    Diet full liquid      AS: Analgesia/Sedation Oxycontin   T: Thromboembolic Prophylaxis Enoxaparin   H: HOB > 300 Yes   U: Stress Ulcer Prophylaxis (if needed) PPI   G: Glucose Control SSI   B: Bowel Function Stool Occurrence: 1   I: Indwelling Catheter (Lines & Marcos) Necessity Marcos   D: De-escalation of Antimicrobials/Pharmacotherapies CTX/Flagyl     Plan for the day/ETD Wean pressors     Code Status:  Family/Goals of Care: DNR  Palliative Consulted for [facility] Discussion         Critical secondary to Patient has a condition that poses threat to life and bodily function: Septic Shock      Critical care was time spent personally by me on the following activities: development of  treatment plan with patient or surrogate and bedside caregivers, discussions with consultants, evaluation of patient's response to treatment, examination of patient, ordering and performing treatments and interventions, ordering and review of laboratory studies, ordering and review of radiographic studies, pulse oximetry, re-evaluation of patient's condition. This critical care time did not overlap with that of any other provider or involve time for any procedures.     Bull Palmer MD  Critical Care Medicine  [facility] - Card[facility]

## 2023-12-16 NOTE — PROGRESS NOTES
[facility]  Critical Care Medicine  Progress Note    Patient Name: David Benjamin Jr.  MRN: [id number]  Admission Date: 12/12/2023  Hospital Length of Stay: 3 days  Code Status: DNR  Attending Provider: Holger Brasher*  Primary Care Provider: Codi Chavez FNP   Principal Problem: Septic shock    Subjective:     HPI:  Mr. Benjamin is a 57yo M with Pmhx of recently diagnosed neuroendocrine stomach mass, with recent PEG placement at OSH, presenting with complaint of difficulty using his PEG. Also complaining of abdominal pain, distention, and drainage at the PEG site. These problems have been progressively worsening over the last week since the PEG was placed. In the ED, he was found to be hypotensive, tachycardic, tachypneic, with an elevated WBC count and PERLA. Given sepsis fluid bolus, CTX, Flagyl and started on Levophed for persistent hypotension. CT Abd/pelvis performed without evidence of abscess or malpositioned PEG tube. Mass and metastatic lesions present as well as moderate ascites. Admitted to MICU with concerns for septic shock requiring vasopressors.     Hospital/ICU Course:  Pt continued on peripheral vasopressors. Gen Surg consulted for problematoc PEG tube, appreciate recs. PEG tube noted to be in appropriate position with gastric lumen is compressed. PEG aspiration likely 2/t position against posterior wall of the stomach. Gen Surg recommended not to use PEG for tube feeds, but will need to remain in place for 6 weeks s/p placement, prior to consideration for removal. Peripheral norepinephrine weaned accordingly. Pt advanced to full liquid diet and tolerating without aspiration. Pain management adjusted to 12.5mcg with fentanyl patch. Oxycodone 5mg PO soln PRN for mod breakthru pain. Hydromorphone 0.5mg IV PRN for severe breakthru pain. Pt continued on IVF in setting of PERLA, Scr improving. Pt more vasopressor support to maintain MAPs > 65.    Interval  History/Significant Events: Pt concerned about increasing opioid requirements & increased pressor requirements. TTE negative for acute change in cardiac function. Pt acknowledged that his metastatic disease is severe and will likely be fatal.    Review of Systems   Constitutional:  Positive for activity change and fatigue.   Respiratory: Negative.     Cardiovascular: Negative.    Gastrointestinal:  Positive for abdominal pain. Negative for diarrhea.   Genitourinary: Negative.    Musculoskeletal: Negative.    Neurological:  Negative for weakness.     Objective:     Vital Signs (Most Recent):  Temp: 97.1 °F (36.2 °C) (12/15/23 1500)  Pulse: 94 (12/15/23 1800)  Resp: (Abnormal) 22 (12/15/23 1800)  BP: (Abnormal) 96/55 (12/15/23 1800)  SpO2: 96 % (12/15/23 1800) Vital Signs (24h Range):  Temp:  [96.5 °F (35.8 °C)-97.4 °F (36.3 °C)] 97.1 °F (36.2 °C)  Pulse:  [] 94  Resp:  [13-30] 22  SpO2:  [92 %-96 %] 96 %  BP: ()/(50-62) 96/55   Weight: 108 kg (238 lb)  Body mass index is 31.4 kg/m².      Intake/Output Summary (Last 24 hours) at 12/15/2023 1858  Last data filed at 12/15/2023 1700  Gross per 24 hour   Intake 3034.05 ml   Output 730 ml   Net 2304.05 ml          Physical Exam  Vitals reviewed.   Constitutional:       General: He is not in acute distress.     Appearance: He is ill-appearing. He is not toxic-appearing.   HENT:      Nose: Nose normal.      Mouth/Throat:      Mouth: Mucous membranes are moist.      Pharynx: Oropharynx is clear.   Eyes:      Extraocular Movements: Extraocular movements intact.      Conjunctiva/sclera: Conjunctivae normal.   Cardiovascular:      Rate and Rhythm: Normal rate and regular rhythm.      Pulses:           Radial pulses are 2+ on the right side and 2+ on the left side.        Dorsalis pedis pulses are 2+ on the right side and 2+ on the left side.      Heart sounds: No murmur heard.  Pulmonary:      Effort: Pulmonary effort is normal. No respiratory distress.      Breath  "sounds: No wheezing.   Abdominal:      General: Bowel sounds are normal. There is distension.      Tenderness: There is generalized abdominal tenderness. There is no right CVA tenderness or guarding. Negative signs include Sanford's sign.      Comments: PEG tube in LUQ   Musculoskeletal:      Cervical back: No tenderness.      Right lower leg: No edema.      Left lower leg: No edema.   Skin:     Capillary Refill: Capillary refill takes less than 2 seconds.      Coloration: Skin is not jaundiced or pale.   Neurological:      Mental Status: He is alert and oriented to person, place, and time. Mental status is at baseline.            Vents:     Lines/Drains/Airways       Drain       Name Duration         Gastrostomy/Enterostomy 12/12/23 2053 LUQ 2 days              Peripheral Intravenous Line       Name Duration         Peripheral IV - Single Lumen 12/13/23 0945 16 G Anterior;Distal;Left Antecubital 2 days         Peripheral IV - Single Lumen 12/13/23 2112 18 G Anterior;Left Upper Arm 1 day                  Significant Labs:    CBC/Anemia Profile:  Recent Labs   Lab 12/14/23  0303 12/15/23  0251   WBC 14.50* 16.55*   HGB 9.2* 9.9*   HCT 27.5* 29.8*    297   MCV 84 87   RDW 14.3 14.7*        Chemistries:  Recent Labs   Lab 12/14/23  0303 12/15/23  0251   * 124*   K 5.0 4.6   CL 98 96   CO2 19* 18*   BUN 51* 41*   CREATININE 1.3 1.3   CALCIUM 8.0* 7.6*   ALBUMIN 1.8* 1.8*   PROT 4.9* 4.9*   BILITOT 0.2 0.2   ALKPHOS 55 54*   ALT 9* 10   AST 37 33   MG 2.5 2.3   PHOS 4.1 3.5       All pertinent labs within the past 24 hours have been reviewed.    Significant Imaging:  I have reviewed all pertinent imaging results/findings within the past 24 hours.    ABG  No results for input(s): "PH", "PO2", "PCO2", "HCO3", "BE" in the last 168 hours.  Assessment/Plan:     Pulmonary  Hiccups  Pt with reflux and hiccups likely 2/t esophageal carcinoma and large volume ascites with stomach compression  -- Chlorpromazine 25mg IV " q8hr PRN for uncomfortable hiccups/nausea    Cardiac/Vascular  Acute hypotension  Pt has not been able to be weaned off peripheral norepinephrine since arrival in MICU. Increased vasopressor support needed to maintain MAPs in setting of extensive metastatic disease. TTE negative for acute change in cardiac function. Oncology and Palliative Care consulted, appreciate recs.  --Continue vasopressor support as indicated    Renal/  PERLA (acute kidney injury)  Patient with PERLA on admission, likely pre-renal in etiology in setting of decreased intake with PEG tube  dysfunction.      Cr at admission: 1.7  Cr at baseline: 0.7  Estimated Creatinine Clearance: 77.9 mL/min (based on SCr of 1.3 mg/dL).     -- s/p sepsis fluid bolus in ED  -- Trending CMP  -- Renally dose medications  -- avoid nephrotoxic agents   -- Strict I&O   -- MAP > 65. Vasopressors   -- Hgb > 7    ID  * Septic shock  This patient does have evidence of infective focus  My overall impression is septic shock due to MAP < 65 and SBP < 90.  Source: Abdominal    Antibiotics given- CTX and Flagyl given in ED. Will broaden coverage to Vanc/Zosyn and de-escalate as appropriate.   Antibiotics (72h ago, onward)      Start     Stop Route Frequency Ordered    12/14/23 2100  metronidazole IVPB 500 mg         -- IV Every 12 hours (non-standard times) 12/14/23 1323    12/14/23 0900  vancomycin 1,500 mg in dextrose 5 % (D5W) 250 mL IVPB (Vial-Mate)         -- IV Every 24 hours (non-standard times) 12/14/23 0835    12/13/23 1700  cefTRIAXone (ROCEPHIN) 2 g in dextrose 5 % in water (D5W) 100 mL IVPB (MB+)         -- IV Every 24 hours (non-standard times) 12/13/23 0034    12/13/23 0100  vancomycin 1,500 mg in dextrose 5 % (D5W) 250 mL IVPB (Vial-Mate)         -- IV Once 12/12/23 2359    12/13/23 0041  vancomycin - pharmacy to dose  (vancomycin IVPB (PEDS and ADULTS))        See Hyperspace for full Linked Orders Report.    -- IV pharmacy to manage frequency 12/12/23 3918       "    Latest lactate reviewed-  Recent Labs   Lab 12/12/23  1639   POCLAC 1.79       Organ dysfunction indicated by Acute kidney injury    Fluid challenge Actual Body weight- Patient will receive 30ml/kg actual body weight to calculate fluid bolus for treatment of septic shock.     Will Start Pressors- Levophed for MAP of 65  Source control achieved by: Abx.     -- Continue BSA   -- Follow up blood cultures   -- Diagnostic para at bedside. Follow up cultures  -- Vasopressors for MAP > 65      Oncology  Cancer related pain  Analgesia rpescribed for cancer related pain in setting of dysphagia  -- Fentanyl 12.5mcg patch  -- Oxycodone 5mg q2h PRN for moderate pain  -- Dilaudid 0.5mg q4h PRN for severe pain    Primary neuroendocrine carcinoma of stomach  [place] [facility] [place]. Lopez. Plan for chemo initiation this month as below.   12/18/2023:       Chemotherapy       CARBOplatin (PARAPLATIN) in sodium chloride 0.9% 250 mL chemo infusion       etoposide (VEPESID) 100 mg/m2 = 230 mg in sodium chloride 0.9% 511.5 mL chemo infusion    Per most recent note: "Long discussion with patient and wife re the diagnosis, natural history and prognosis of stage IV gastric NEC. Cancer is not curable but treatable. The goal of treatment is palliative, with hopes of containing the cancer, slowing down growth, and prolonging life."    GI  Ascites  Abdominal pain + distention with ascites noted on CT imaging. Diagnostic paracentesis performed, SBP unlikely per lab results. CTX and Vanc continued in setting of septic shock with vasopressors being weaned accordingly. 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  -- Continue abx as indicated  -- Consider therapeutic para if abdominal pain not controlled  -- PRN analgesia for acute pain      PEG tube malfunction  Patient with PEG tube placed 1 week ago at OSH for dysphagia in setting of stomach/esophageal mass. PEG " "tube flushed successfully with minimal volume, tube aspiration appreciated. Consult GI vs Gen Surg, appreciate recs. PEG tube correctly placed but balloon is compressed against posterior wall of the stomach. Pt recommended to discontinue use. PEG tube exchange will not improve functionality. Pt advanced to full liquid diet and tolerating well.  -- Continue full liquid diet as tolerated    Mass of gastroesophageal junction  Recently discovered mass of stomach/distal esophagus. EGD biopsy diagnosed high grade neuroendocrine tumor.     -- S/p PEG placement approximately 1 week ago at OSH for persistent dysphagia  -- Had PET scan today, demonstrating "Extensive lower thoracic and abdominal lymphadenopathy with smaller lymph nodes of the chest.  There is also extensive peritoneal omental metastases and there are bone metastases."   -- Per record review, saw Dr. Lopez with hematology/oncology today. Plan to start palliative chemotherapy after he recovers from acute illness.     Palliative Care  Palliative care encounter  Insight/goals of care- good insight despite acuity of decline and new diagnosis of incurable cancer. Remains hopeful he can improve to the point where he might benefit from palliative chemo but understands the limitations if he continues to decline. Clear limits on care if he appears to be dying.      Social support- supportive wife and parents (still living) has may friends and strong Rastafari community in [place]     Psychological- fair coping strategies given gravity of recent developments      Spiritual- Hindu, spiritual needs met by local community     Symptom management- cancer-related pain (previously on tramadol, oxy5/10s), also with hiccups and nausea     Recommendations  -DNR, full support, would consider procedures and vasopressors as long as there remains a reasonable chance of recovery  -will continue to engage on other limits he might place on his care (did not specifically address CELY or " HD)  -recommend trial of chlorpromazine 25mg IV q8hr PRN for uncomfortable hiccups/nausea        Critical Care Daily Checklist:     A: Awake: RASS Goal/Actual Goal: RASS Goal: 0-->alert and calm  Actual:     B: Spontaneous Breathing Trial Performed?    C: SAT & SBT Coordinated?  N/A                      D: Delirium: CAM-ICU Overall CAM-ICU: Negative   E: Early Mobility Performed? Yes   F: Feeding Goal:    Status:         Current Diet Order   Procedures    Diet full liquid      AS: Analgesia/Sedation Fent/Oxy/Dilaudid   T: Thromboembolic Prophylaxis Enoxaparin   H: HOB > 300 Yes   U: Stress Ulcer Prophylaxis (if needed) PPI   G: Glucose Control SSI   B: Bowel Function Stool Occurrence: 1   I: Indwelling Catheter (Lines & Marcos) Necessity Marcos   D: De-escalation of Antimicrobials/Pharmacotherapies CTX/Flagyl/Vanc     Plan for the day/ETD Wean pressors     Code Status:  Family/Goals of Care: DNR  Palliative Consulted for GOC Discussion         Critical secondary to Patient has a condition that poses threat to life and bodily function: Septic Shock      Critical care was time spent personally by me on the following activities: development of treatment plan with patient or surrogate and bedside caregivers, discussions with consultants, evaluation of patient's response to treatment, examination of patient, ordering and performing treatments and interventions, ordering and review of laboratory studies, ordering and review of radiographic studies, pulse oximetry, re-evaluation of patient's condition. This critical care time did not overlap with that of any other provider or involve time for any procedures.     Bull Palmer MD  Critical Care Medicine  [facility] - Cardiac Medical ICU

## 2023-12-16 NOTE — PROGRESS NOTES
Therapy with vancomycin complete and/or consult discontinued by provider.  Pharmacy will sign off, please re-consult as needed.    Jameson Sanabria, MadinaD

## 2023-12-17 PROBLEM — Z51.5 COMFORT MEASURES ONLY STATUS: Status: ACTIVE | Noted: 2023-12-17

## 2023-12-17 PROBLEM — Z66 DNR (DO NOT RESUSCITATE): Status: ACTIVE | Noted: 2023-12-17

## 2023-12-17 LAB
ALBUMIN FLD-MCNC: 1.4 G/DL
ALBUMIN SERPL BCP-MCNC: 1.8 G/DL (ref 3.5–5.2)
ALP SERPL-CCNC: 61 U/L (ref 55–135)
ALT SERPL W/O P-5'-P-CCNC: 11 U/L (ref 10–44)
ANION GAP SERPL CALC-SCNC: 11 MMOL/L (ref 8–16)
APPEARANCE FLD: ABNORMAL
AST SERPL-CCNC: 36 U/L (ref 10–40)
BACTERIA BLD CULT: NORMAL
BACTERIA BLD CULT: NORMAL
BASOPHILS # BLD AUTO: 0.04 K/UL (ref 0–0.2)
BASOPHILS NFR BLD: 0.2 % (ref 0–1.9)
BILIRUB SERPL-MCNC: 0.2 MG/DL (ref 0.1–1)
BODY FLD TYPE: ABNORMAL
BODY FLUID SOURCE, LDH: NORMAL
BUN SERPL-MCNC: 47 MG/DL (ref 6–20)
CALCIUM SERPL-MCNC: 7.9 MG/DL (ref 8.7–10.5)
CHLORIDE SERPL-SCNC: 94 MMOL/L (ref 95–110)
CO2 SERPL-SCNC: 18 MMOL/L (ref 23–29)
COLOR FLD: ABNORMAL
CREAT SERPL-MCNC: 1.9 MG/DL (ref 0.5–1.4)
DIFFERENTIAL METHOD: ABNORMAL
EOSINOPHIL # BLD AUTO: 0 K/UL (ref 0–0.5)
EOSINOPHIL NFR BLD: 0 % (ref 0–8)
ERYTHROCYTE [DISTWIDTH] IN BLOOD BY AUTOMATED COUNT: 14.8 % (ref 11.5–14.5)
EST. GFR  (NO RACE VARIABLE): 39.9 ML/MIN/1.73 M^2
GLUCOSE SERPL-MCNC: 159 MG/DL (ref 70–110)
GRAM STN SPEC: NORMAL
GRAM STN SPEC: NORMAL
HCT VFR BLD AUTO: 31.4 % (ref 40–54)
HGB BLD-MCNC: 10 G/DL (ref 14–18)
IMM GRANULOCYTES # BLD AUTO: 0.44 K/UL (ref 0–0.04)
IMM GRANULOCYTES NFR BLD AUTO: 2.1 % (ref 0–0.5)
LDH FLD L TO P-CCNC: 1776 U/L
LYMPHOCYTES # BLD AUTO: 0.9 K/UL (ref 1–4.8)
LYMPHOCYTES NFR BLD: 4.2 % (ref 18–48)
LYMPHOCYTES NFR FLD MANUAL: 16 %
MAGNESIUM SERPL-MCNC: 2.3 MG/DL (ref 1.6–2.6)
MCH RBC QN AUTO: 26.9 PG (ref 27–31)
MCHC RBC AUTO-ENTMCNC: 31.8 G/DL (ref 32–36)
MCV RBC AUTO: 84 FL (ref 82–98)
MONOCYTES # BLD AUTO: 0.8 K/UL (ref 0.3–1)
MONOCYTES NFR BLD: 3.8 % (ref 4–15)
MONOS+MACROS NFR FLD MANUAL: 13 %
NEUTROPHILS # BLD AUTO: 19 K/UL (ref 1.8–7.7)
NEUTROPHILS NFR BLD: 89.7 % (ref 38–73)
NEUTROPHILS NFR FLD MANUAL: 37 %
NRBC BLD-RTO: 1 /100 WBC
OTHER CELLS FLD MANUAL: 34 %
PHOSPHATE SERPL-MCNC: 4.2 MG/DL (ref 2.7–4.5)
PLATELET # BLD AUTO: 342 K/UL (ref 150–450)
PMV BLD AUTO: 10 FL (ref 9.2–12.9)
POCT GLUCOSE: 171 MG/DL (ref 70–110)
POTASSIUM SERPL-SCNC: 5 MMOL/L (ref 3.5–5.1)
PROT FLD-MCNC: 2.6 G/DL
PROT SERPL-MCNC: 5.2 G/DL (ref 6–8.4)
RBC # BLD AUTO: 3.72 M/UL (ref 4.6–6.2)
SODIUM SERPL-SCNC: 123 MMOL/L (ref 136–145)
SPECIMEN SOURCE: NORMAL
SPECIMEN SOURCE: NORMAL
WBC # BLD AUTO: 21.19 K/UL (ref 3.9–12.7)
WBC # FLD: 507 /CU MM

## 2023-12-17 PROCEDURE — 88112 CYTOPATH CELL ENHANCE TECH: CPT | Mod: 26,,, | Performed by: PATHOLOGY

## 2023-12-17 PROCEDURE — 84100 ASSAY OF PHOSPHORUS: CPT | Performed by: NURSE PRACTITIONER

## 2023-12-17 PROCEDURE — 82042 OTHER SOURCE ALBUMIN QUAN EA: CPT

## 2023-12-17 PROCEDURE — 87205 SMEAR GRAM STAIN: CPT

## 2023-12-17 PROCEDURE — 25000003 PHARM REV CODE 250

## 2023-12-17 PROCEDURE — 63600175 PHARM REV CODE 636 W HCPCS

## 2023-12-17 PROCEDURE — 88305 TISSUE EXAM BY PATHOLOGIST: CPT | Performed by: PATHOLOGY

## 2023-12-17 PROCEDURE — 84157 ASSAY OF PROTEIN OTHER: CPT

## 2023-12-17 PROCEDURE — 88305 TISSUE EXAM BY PATHOLOGIST: CPT | Mod: 26,,, | Performed by: PATHOLOGY

## 2023-12-17 PROCEDURE — 87070 CULTURE OTHR SPECIMN AEROBIC: CPT

## 2023-12-17 PROCEDURE — 20000000 HC ICU ROOM

## 2023-12-17 PROCEDURE — 63600175 PHARM REV CODE 636 W HCPCS: Performed by: NURSE PRACTITIONER

## 2023-12-17 PROCEDURE — 88112 PR  CYTOPATH, CELL ENHANCE TECH: ICD-10-PCS | Mod: 26,,, | Performed by: PATHOLOGY

## 2023-12-17 PROCEDURE — 25000242 PHARM REV CODE 250 ALT 637 W/ HCPCS

## 2023-12-17 PROCEDURE — 25000003 PHARM REV CODE 250: Performed by: NURSE PRACTITIONER

## 2023-12-17 PROCEDURE — 99233 SBSQ HOSP IP/OBS HIGH 50: CPT | Mod: ,,, | Performed by: INTERNAL MEDICINE

## 2023-12-17 PROCEDURE — 80053 COMPREHEN METABOLIC PANEL: CPT | Performed by: NURSE PRACTITIONER

## 2023-12-17 PROCEDURE — 88305 TISSUE EXAM BY PATHOLOGIST: ICD-10-PCS | Mod: 26,,, | Performed by: PATHOLOGY

## 2023-12-17 PROCEDURE — 83735 ASSAY OF MAGNESIUM: CPT | Performed by: NURSE PRACTITIONER

## 2023-12-17 PROCEDURE — 99233 PR SUBSEQUENT HOSPITAL CARE,LEVL III: ICD-10-PCS | Mod: ,,, | Performed by: INTERNAL MEDICINE

## 2023-12-17 PROCEDURE — 85025 COMPLETE CBC W/AUTO DIFF WBC: CPT | Performed by: NURSE PRACTITIONER

## 2023-12-17 PROCEDURE — 89051 BODY FLUID CELL COUNT: CPT

## 2023-12-17 PROCEDURE — 87075 CULTR BACTERIA EXCEPT BLOOD: CPT

## 2023-12-17 PROCEDURE — 83615 LACTATE (LD) (LDH) ENZYME: CPT

## 2023-12-17 PROCEDURE — P9047 ALBUMIN (HUMAN), 25%, 50ML: HCPCS | Mod: JZ,JG

## 2023-12-17 PROCEDURE — 88112 CYTOPATH CELL ENHANCE TECH: CPT | Performed by: PATHOLOGY

## 2023-12-17 RX ORDER — LORAZEPAM 2 MG/ML
1 INJECTION INTRAMUSCULAR EVERY 4 HOURS PRN
Status: DISCONTINUED | OUTPATIENT
Start: 2023-12-17 | End: 2023-12-19 | Stop reason: HOSPADM

## 2023-12-17 RX ORDER — NOREPINEPHRINE BITARTRATE/D5W 4MG/250ML
0-.2 PLASTIC BAG, INJECTION (ML) INTRAVENOUS CONTINUOUS
Status: DISCONTINUED | OUTPATIENT
Start: 2023-12-17 | End: 2023-12-17

## 2023-12-17 RX ORDER — MORPHINE SULFATE 2 MG/ML
4 INJECTION, SOLUTION INTRAMUSCULAR; INTRAVENOUS EVERY 4 HOURS PRN
Status: DISCONTINUED | OUTPATIENT
Start: 2023-12-17 | End: 2023-12-18

## 2023-12-17 RX ORDER — NOREPINEPHRINE BITARTRATE/D5W 4MG/250ML
PLASTIC BAG, INJECTION (ML) INTRAVENOUS
Status: DISPENSED
Start: 2023-12-17 | End: 2023-12-18

## 2023-12-17 RX ORDER — MORPHINE SULFATE 2 MG/ML
4 INJECTION, SOLUTION INTRAMUSCULAR; INTRAVENOUS ONCE AS NEEDED
Status: COMPLETED | OUTPATIENT
Start: 2023-12-17 | End: 2023-12-17

## 2023-12-17 RX ORDER — MORPHINE SULFATE 2 MG/ML
2 INJECTION, SOLUTION INTRAMUSCULAR; INTRAVENOUS EVERY 4 HOURS PRN
Status: DISCONTINUED | OUTPATIENT
Start: 2023-12-17 | End: 2023-12-18

## 2023-12-17 RX ORDER — OXYCODONE HCL 5 MG/5 ML
5 SOLUTION, ORAL ORAL EVERY 6 HOURS PRN
Status: DISCONTINUED | OUTPATIENT
Start: 2023-12-17 | End: 2023-12-18

## 2023-12-17 RX ORDER — ALBUMIN HUMAN 250 G/1000ML
25 SOLUTION INTRAVENOUS ONCE AS NEEDED
Status: COMPLETED | OUTPATIENT
Start: 2023-12-17 | End: 2023-12-17

## 2023-12-17 RX ORDER — NOREPINEPHRINE BITARTRATE/D5W 4MG/250ML
0-.3 PLASTIC BAG, INJECTION (ML) INTRAVENOUS CONTINUOUS
Status: DISPENSED | OUTPATIENT
Start: 2023-12-17 | End: 2023-12-18

## 2023-12-17 RX ORDER — GLYCOPYRROLATE 0.2 MG/ML
0.1 INJECTION INTRAMUSCULAR; INTRAVENOUS 3 TIMES DAILY PRN
Status: DISCONTINUED | OUTPATIENT
Start: 2023-12-18 | End: 2023-12-19 | Stop reason: HOSPADM

## 2023-12-17 RX ADMIN — NOREPINEPHRINE BITARTRATE 0.28 MCG/KG/MIN: 4 INJECTION, SOLUTION INTRAVENOUS at 07:12

## 2023-12-17 RX ADMIN — METRONIDAZOLE 500 MG: 5 INJECTION, SOLUTION INTRAVENOUS at 10:12

## 2023-12-17 RX ADMIN — ALBUMIN (HUMAN) 12.5 G: 12.5 SOLUTION INTRAVENOUS at 05:12

## 2023-12-17 RX ADMIN — MORPHINE SULFATE 4 MG: 2 INJECTION, SOLUTION INTRAMUSCULAR; INTRAVENOUS at 05:12

## 2023-12-17 RX ADMIN — MORPHINE SULFATE 4 MG: 2 INJECTION, SOLUTION INTRAMUSCULAR; INTRAVENOUS at 12:12

## 2023-12-17 RX ADMIN — HYDROCORTISONE SODIUM SUCCINATE 100 MG: 100 INJECTION, POWDER, FOR SOLUTION INTRAMUSCULAR; INTRAVENOUS at 03:12

## 2023-12-17 RX ADMIN — METHYLNALTREXONE BROMIDE 12 MG: 12 INJECTION, SOLUTION SUBCUTANEOUS at 03:12

## 2023-12-17 RX ADMIN — NOREPINEPHRINE BITARTRATE 0.16 MCG/KG/MIN: 4 INJECTION, SOLUTION INTRAVENOUS at 06:12

## 2023-12-17 RX ADMIN — OXYCODONE HYDROCHLORIDE 5 MG: 5 SOLUTION ORAL at 08:12

## 2023-12-17 RX ADMIN — NOREPINEPHRINE BITARTRATE 0.12 MCG/KG/MIN: 4 INJECTION, SOLUTION INTRAVENOUS at 02:12

## 2023-12-17 RX ADMIN — NOREPINEPHRINE BITARTRATE 0.18 MCG/KG/MIN: 4 INJECTION, SOLUTION INTRAVENOUS at 02:12

## 2023-12-17 RX ADMIN — OXYCODONE HYDROCHLORIDE 5 MG: 5 SOLUTION ORAL at 12:12

## 2023-12-17 RX ADMIN — FLUDROCORTISONE ACETATE 100 MCG: 0.1 TABLET ORAL at 08:12

## 2023-12-17 RX ADMIN — METRONIDAZOLE 500 MG: 5 INJECTION, SOLUTION INTRAVENOUS at 08:12

## 2023-12-17 RX ADMIN — HYDROCORTISONE SODIUM SUCCINATE 100 MG: 100 INJECTION, POWDER, FOR SOLUTION INTRAMUSCULAR; INTRAVENOUS at 05:12

## 2023-12-17 RX ADMIN — ENOXAPARIN SODIUM 40 MG: 40 INJECTION SUBCUTANEOUS at 04:12

## 2023-12-17 RX ADMIN — MUPIROCIN: 20 OINTMENT TOPICAL at 08:12

## 2023-12-17 RX ADMIN — NOREPINEPHRINE BITARTRATE 0.16 MCG/KG/MIN: 4 INJECTION, SOLUTION INTRAVENOUS at 10:12

## 2023-12-17 RX ADMIN — MORPHINE SULFATE 2 MG: 2 INJECTION, SOLUTION INTRAMUSCULAR; INTRAVENOUS at 11:12

## 2023-12-17 RX ADMIN — NOREPINEPHRINE BITARTRATE 0.26 MCG/KG/MIN: 4 INJECTION, SOLUTION INTRAVENOUS at 05:12

## 2023-12-17 RX ADMIN — CEFTRIAXONE 2 G: 2 INJECTION, POWDER, FOR SOLUTION INTRAMUSCULAR; INTRAVENOUS at 04:12

## 2023-12-17 RX ADMIN — HYDROCORTISONE SODIUM SUCCINATE 100 MG: 100 INJECTION, POWDER, FOR SOLUTION INTRAMUSCULAR; INTRAVENOUS at 10:12

## 2023-12-17 RX ADMIN — OXYCODONE HYDROCHLORIDE 5 MG: 5 SOLUTION ORAL at 11:12

## 2023-12-17 NOTE — SUBJECTIVE & OBJECTIVE
Interval History/Significant Events: Vasopressor requirements increasing. Pt trying to decrease daily MME (111 to 74 in past 24hrs). Overall pain/discomfort appears to be worsended. Increased abdominal distention secondary to ascitic buildup in setting of carcinomatosis. Will offer therapeutic paracentesis.    Review of Systems   Constitutional:  Positive for activity change and fatigue.   HENT:  Negative for congestion and sore throat.    Eyes:  Negative for visual disturbance.   Respiratory: Negative.  Negative for cough, choking and chest tightness.    Cardiovascular:  Positive for leg swelling. Negative for chest pain.   Gastrointestinal:  Positive for abdominal distention and abdominal pain. Negative for blood in stool, constipation and diarrhea.   Genitourinary: Negative.  Negative for flank pain.   Musculoskeletal: Negative.    Skin:  Positive for pallor.   Neurological:  Negative for weakness and headaches.   Hematological:  Negative for adenopathy.   Psychiatric/Behavioral:  Negative for agitation and behavioral problems.      Objective:     Vital Signs (Most Recent):  Temp: 97.4 °F (36.3 °C) (12/17/23 0301)  Pulse: 90 (12/17/23 0530)  Resp: (Abnormal) 22 (12/17/23 0530)  BP: (Abnormal) 85/52 (12/17/23 0530)  SpO2: (Abnormal) 94 % (12/17/23 0530) Vital Signs (24h Range):  Temp:  [97.2 °F (36.2 °C)-98 °F (36.7 °C)] 97.4 °F (36.3 °C)  Pulse:  [] 90  Resp:  [15-25] 22  SpO2:  [93 %-97 %] 94 %  BP: ()/(50-82) 85/52   Weight: 113 kg (249 lb 1.9 oz)  Body mass index is 32.87 kg/m².      Intake/Output Summary (Last 24 hours) at 12/17/2023 0805  Last data filed at 12/17/2023 0531  Gross per 24 hour   Intake 1757.27 ml   Output 200 ml   Net 1557.27 ml          Physical Exam  Vitals reviewed.   Constitutional:       General: He is not in acute distress.     Appearance: He is ill-appearing. He is not toxic-appearing.   HENT:      Nose: Nose normal.      Mouth/Throat:      Mouth: Mucous membranes are moist.       Pharynx: Oropharynx is clear.   Eyes:      Extraocular Movements: Extraocular movements intact.      Conjunctiva/sclera: Conjunctivae normal.   Cardiovascular:      Rate and Rhythm: Normal rate and regular rhythm.      Pulses:           Radial pulses are 2+ on the right side and 2+ on the left side.        Dorsalis pedis pulses are 2+ on the right side and 2+ on the left side.      Heart sounds: No murmur heard.  Pulmonary:      Effort: Pulmonary effort is normal. No respiratory distress.      Breath sounds: No wheezing.   Abdominal:      General: Bowel sounds are normal. There is distension.      Tenderness: There is generalized abdominal tenderness. There is no right CVA tenderness, left CVA tenderness, guarding or rebound. Negative signs include Sanford's sign.      Comments: PEG tube in LUQ  Severe abdominal distention  Diffuse abdominal tenderness    Musculoskeletal:         General: Swelling and tenderness present.      Cervical back: No tenderness.      Right lower leg: Edema present.      Left lower leg: Edema present.   Skin:     Capillary Refill: Capillary refill takes less than 2 seconds.      Coloration: Skin is not jaundiced or pale.   Neurological:      Mental Status: He is alert and oriented to person, place, and time. Mental status is at baseline.            Vents:     Lines/Drains/Airways       Drain       Name Duration         Gastrostomy/Enterostomy 12/12/23 2053 LUQ 4 days              Peripheral Intravenous Line       Name Duration         Peripheral IV - Single Lumen 12/13/23 0945 16 G Anterior;Distal;Left Antecubital 3 days         Peripheral IV - Single Lumen 12/13/23 2112 18 G Anterior;Left Upper Arm 3 days         Peripheral IV - Single Lumen 12/16/23 0248 16 G Anterior;Left;Proximal Upper Arm 1 day                  Significant Labs:    CBC/Anemia Profile:  Recent Labs   Lab 12/16/23  0235 12/17/23  0242   WBC 20.52* 21.19*   HGB 9.9* 10.0*   HCT 30.8* 31.4*    342   MCV 86 84    RDW 14.6* 14.8*        Chemistries:  Recent Labs   Lab 12/16/23  0235 12/17/23  0242   * 123*   K 4.7 5.0   CL 92* 94*   CO2 19* 18*   BUN 42* 47*   CREATININE 1.6* 1.9*   CALCIUM 8.0* 7.9*   ALBUMIN 1.9* 1.8*   PROT 5.3* 5.2*   BILITOT 0.3 0.2   ALKPHOS 62 61   ALT 11 11   AST 39 36   MG 2.4 2.3   PHOS 3.7 4.2       All pertinent labs within the past 24 hours have been reviewed.    Significant Imaging:  I have reviewed all pertinent imaging results/findings within the past 24 hours.

## 2023-12-17 NOTE — PROGRESS NOTES
[facility]  Critical Care Medicine  Progress Note    Patient Name: David Benjamin Jr.  MRN: [id number]  Admission Date: 12/12/2023  Hospital Length of Stay: 5 days  Code Status: DNR  Attending Provider: Holger Brasher*  Primary Care Provider: Codi Chavez FNP   Principal Problem: Septic shock    Subjective:     HPI:  Mr. Benjamin is a 57yo M with Pmhx of recently diagnosed neuroendocrine stomach mass, with recent PEG placement at OSH, presenting with complaint of difficulty using his PEG. Also complaining of abdominal pain, distention, and drainage at the PEG site. These problems have been progressively worsening over the last week since the PEG was placed. In the ED, he was found to be hypotensive, tachycardic, tachypneic, with an elevated WBC count and PERLA. Given sepsis fluid bolus, CTX, Flagyl and started on Levophed for persistent hypotension. CT Abd/pelvis performed without evidence of abscess or malpositioned PEG tube. Mass and metastatic lesions present as well as moderate ascites. Admitted to MICU with concerns for septic shock requiring vasopressors.     Hospital/ICU Course:  Pt continued on peripheral vasopressors. Gen Surg consulted for problematoc PEG tube, appreciate recs. PEG tube noted to be in appropriate position with gastric lumen is compressed. PEG aspiration likely 2/t position against posterior wall of the stomach. Gen Surg recommended not to use PEG for tube feeds, but will need to remain in place for 6 weeks s/p placement, prior to consideration for removal. Peripheral norepinephrine weaned accordingly. Pt advanced to full liquid diet and tolerating without aspiration. Pain management adjusted to 12.5mcg with fentanyl patch. Oxycodone 5mg PO soln PRN for mod breakthru pain. Hydromorphone 0.5mg IV PRN for severe breakthru pain. Pt continued on IVF in setting of PERLA, Scr improving. Pt more vasopressor support to maintain MAPs > 65. Adjusted analgesics to PO  meds such that they could be prescribed on discharge. Pt is hypervolemic, +8.5L. Increased abdominal distention with tenderness to palpation noted. Vasopressor requirements increasing. Severe ascites present on abdominal US, will scheduled therapeutic paracentesis.     Interval History/Significant Events: Vasopressor requirements increasing. Pt trying to decrease daily MME (111 to 74 in past 24hrs). Overall pain/discomfort appears to be worsended. Increased abdominal distention secondary to ascitic buildup in setting of carcinomatosis. Will offer therapeutic paracentesis.    Review of Systems   Constitutional:  Positive for activity change and fatigue.   HENT:  Negative for congestion and sore throat.    Eyes:  Negative for visual disturbance.   Respiratory: Negative.  Negative for cough, choking and chest tightness.    Cardiovascular:  Positive for leg swelling. Negative for chest pain.   Gastrointestinal:  Positive for abdominal distention and abdominal pain. Negative for blood in stool, constipation and diarrhea.   Genitourinary: Negative.  Negative for flank pain.   Musculoskeletal: Negative.    Skin:  Positive for pallor.   Neurological:  Negative for weakness and headaches.   Hematological:  Negative for adenopathy.   Psychiatric/Behavioral:  Negative for agitation and behavioral problems.      Objective:     Vital Signs (Most Recent):  Temp: 97.4 °F (36.3 °C) (12/17/23 0301)  Pulse: 90 (12/17/23 0530)  Resp: (Abnormal) 22 (12/17/23 0530)  BP: (Abnormal) 85/52 (12/17/23 0530)  SpO2: (Abnormal) 94 % (12/17/23 0530) Vital Signs (24h Range):  Temp:  [97.2 °F (36.2 °C)-98 °F (36.7 °C)] 97.4 °F (36.3 °C)  Pulse:  [] 90  Resp:  [15-25] 22  SpO2:  [93 %-97 %] 94 %  BP: ()/(50-82) 85/52   Weight: 113 kg (249 lb 1.9 oz)  Body mass index is 32.87 kg/m².      Intake/Output Summary (Last 24 hours) at 12/17/2023 0805  Last data filed at 12/17/2023 0531  Gross per 24 hour   Intake 1757.27 ml   Output 200 ml    Net 1557.27 ml          Physical Exam  Vitals reviewed.   Constitutional:       General: He is not in acute distress.     Appearance: He is ill-appearing. He is not toxic-appearing.   HENT:      Nose: Nose normal.      Mouth/Throat:      Mouth: Mucous membranes are moist.      Pharynx: Oropharynx is clear.   Eyes:      Extraocular Movements: Extraocular movements intact.      Conjunctiva/sclera: Conjunctivae normal.   Cardiovascular:      Rate and Rhythm: Normal rate and regular rhythm.      Pulses:           Radial pulses are 2+ on the right side and 2+ on the left side.        Dorsalis pedis pulses are 2+ on the right side and 2+ on the left side.      Heart sounds: No murmur heard.  Pulmonary:      Effort: Pulmonary effort is normal. No respiratory distress.      Breath sounds: No wheezing.   Abdominal:      General: Bowel sounds are normal. There is distension.      Tenderness: There is generalized abdominal tenderness. There is no right CVA tenderness, left CVA tenderness, guarding or rebound. Negative signs include Sanford's sign.      Comments: PEG tube in LUQ  Severe abdominal distention  Diffuse abdominal tenderness    Musculoskeletal:         General: Swelling and tenderness present.      Cervical back: No tenderness.      Right lower leg: Edema present.      Left lower leg: Edema present.   Skin:     Capillary Refill: Capillary refill takes less than 2 seconds.      Coloration: Skin is not jaundiced or pale.   Neurological:      Mental Status: He is alert and oriented to person, place, and time. Mental status is at baseline.            Vents:     Lines/Drains/Airways       Drain       Name Duration         Gastrostomy/Enterostomy 12/12/23 2053 LUQ 4 days              Peripheral Intravenous Line       Name Duration         Peripheral IV - Single Lumen 12/13/23 0945 16 G Anterior;Distal;Left Antecubital 3 days         Peripheral IV - Single Lumen 12/13/23 2112 18 G Anterior;Left Upper Arm 3 days          "Peripheral IV - Single Lumen 12/16/23 0248 16 G Anterior;Left;Proximal Upper Arm 1 day                  Significant Labs:    CBC/Anemia Profile:  Recent Labs   Lab 12/16/23 0235 12/17/23  0242   WBC 20.52* 21.19*   HGB 9.9* 10.0*   HCT 30.8* 31.4*    342   MCV 86 84   RDW 14.6* 14.8*        Chemistries:  Recent Labs   Lab 12/16/23 0235 12/17/23 0242   * 123*   K 4.7 5.0   CL 92* 94*   CO2 19* 18*   BUN 42* 47*   CREATININE 1.6* 1.9*   CALCIUM 8.0* 7.9*   ALBUMIN 1.9* 1.8*   PROT 5.3* 5.2*   BILITOT 0.3 0.2   ALKPHOS 62 61   ALT 11 11   AST 39 36   MG 2.4 2.3   PHOS 3.7 4.2       All pertinent labs within the past 24 hours have been reviewed.    Significant Imaging:  I have reviewed all pertinent imaging results/findings within the past 24 hours.    ABG  No results for input(s): "PH", "PO2", "PCO2", "HCO3", "BE" in the last 168 hours.  Assessment/Plan:     Pulmonary  Hiccups  Pt with reflux and hiccups likely 2/t esophageal carcinoma and large volume ascites with stomach compression  -- Chlorpromazine 25mg IV q8hr PRN for uncomfortable hiccups/nausea    Cardiac/Vascular  Acute hypotension  Pt has not been able to be weaned off peripheral norepinephrine since arrival in MICU. Increased vasopressor support needed to maintain MAPs in setting of extensive metastatic disease. TTE negative for acute change in cardiac function. Oncology and Palliative Care consulted, appreciate recs.  --Continue vasopressor support as indicated    Renal/  PERLA (acute kidney injury)  Patient with PERLA on admission, likely pre-renal in etiology in setting of decreased intake with PEG tube  dysfunction.      Cr at admission: 1.7  Cr at baseline: 0.7  Estimated Creatinine Clearance: 77.9 mL/min (based on SCr of 1.3 mg/dL).     -- s/p sepsis fluid bolus in ED  -- Trending CMP  -- Renally dose medications  -- avoid nephrotoxic agents   -- Strict I&O   -- MAP > 65. Vasopressors   -- Hgb > 7    ID  * Septic shock  This patient does " have evidence of infective focus  My overall impression is septic shock due to MAP < 65 and SBP < 90.  Source: Abdominal    Antibiotics given- CTX and Flagyl given in ED. Will broaden coverage to Vanc/Zosyn and de-escalate as appropriate.   Antibiotics (72h ago, onward)      Start     Stop Route Frequency Ordered    12/14/23 2100  metronidazole IVPB 500 mg         -- IV Every 12 hours (non-standard times) 12/14/23 1323    12/14/23 0900  vancomycin 1,500 mg in dextrose 5 % (D5W) 250 mL IVPB (Vial-Mate)         -- IV Every 24 hours (non-standard times) 12/14/23 0835    12/13/23 1700  cefTRIAXone (ROCEPHIN) 2 g in dextrose 5 % in water (D5W) 100 mL IVPB (MB+)         -- IV Every 24 hours (non-standard times) 12/13/23 0034    12/13/23 0100  vancomycin 1,500 mg in dextrose 5 % (D5W) 250 mL IVPB (Vial-Mate)         -- IV Once 12/12/23 2359    12/13/23 0041  vancomycin - pharmacy to dose  (vancomycin IVPB (PEDS and ADULTS))        See Hyperspace for full Linked Orders Report.    -- IV pharmacy to manage frequency 12/12/23 2342          Latest lactate reviewed-  Recent Labs   Lab 12/12/23  1639   POCLAC 1.79       Organ dysfunction indicated by Acute kidney injury    Fluid challenge Actual Body weight- Patient will receive 30ml/kg actual body weight to calculate fluid bolus for treatment of septic shock.     Will Start Pressors- Levophed for MAP of 65  Source control achieved by: Abx.     -- Continue BSA   -- Follow up blood cultures   -- Diagnostic para at bedside. Follow up cultures  -- Vasopressors for MAP > 65      Oncology  Cancer related pain  Analgesia rpescribed for cancer related pain in setting of dysphagia  -- Fentanyl 12.5mcg patch  -- Oxycodone 5mg q2h PRN for moderate pain  -- Dilaudid 0.5mg q4h PRN for severe pain    Primary neuroendocrine carcinoma of stomach  [place] [facility] [place]. Plan for chemo initiation this month as below.   12/18/2023:       Chemotherapy       CARBOplatin (PARAPLATIN) in  "sodium chloride 0.9% 250 mL chemo infusion       etoposide (VEPESID) 100 mg/m2 = 230 mg in sodium chloride 0.9% 511.5 mL chemo infusion    Per most recent note: "Long discussion with patient and wife re the diagnosis, natural history and prognosis of stage IV gastric NEC. Cancer is not curable but treatable. The goal of treatment is palliative, with hopes of containing the cancer, slowing down growth, and prolonging life."    GI  Ascites  Abdominal pain + distention with ascites noted on CT imaging. Diagnostic paracentesis performed, SBP unlikely per lab results. CTX and Vanc continued in setting of septic shock with vasopressors being weaned accordingly. 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  -- Continue abx as indicated  -- Consider therapeutic para if abdominal pain not controlled  -- PRN analgesia for acute pain      PEG tube malfunction  Patient with PEG tube placed 1 week ago at OSH for dysphagia in setting of stomach/esophageal mass. PEG tube flushed successfully with minimal volume, tube aspiration appreciated. Consult GI vs Gen Surg, appreciate recs. PEG tube correctly placed but balloon is compressed against posterior wall of the stomach. Pt recommended to discontinue use. PEG tube exchange will not improve functionality. Pt advanced to full liquid diet and tolerating well.  -- Continue full liquid diet as tolerated    Mass of gastroesophageal junction  Recently discovered mass of stomach/distal esophagus. EGD biopsy diagnosed high grade neuroendocrine tumor.     -- S/p PEG placement approximately 1 week ago at OSH for persistent dysphagia  -- Had PET scan today, demonstrating "Extensive lower thoracic and abdominal lymphadenopathy with smaller lymph nodes of the chest.  There is also extensive peritoneal omental metastases and there are bone metastases."   -- Per record review, saw Dr. Lopez with hematology/oncology today. Plan " to start palliative chemotherapy after he recovers from acute illness.     Palliative Care  Palliative care encounter  Insight/goals of care- good insight despite acuity of decline and new diagnosis of incurable cancer. Remains hopeful he can improve to the point where he might benefit from palliative chemo but understands the limitations if he continues to decline. Clear limits on care if he appears to be dying.      Social support- supportive wife and parents (still living) has may friends and strong Rastafarian community in [place]     Psychological- fair coping strategies given gravity of recent developments      Spiritual- Rastafarian, spiritual needs met by local community     Symptom management- cancer-related pain (previously on tramadol, oxy5/10s), also with hiccups and nausea     Recommendations  -DNR, full support, would consider procedures and vasopressors as long as there remains a reasonable chance of recovery  -will continue to engage on other limits he might place on his care (did not specifically address CELY or HD)  -recommend trial of chlorpromazine 25mg IV q8hr PRN for uncomfortable hiccups/nausea         Critical Care Daily Checklist:     A: Awake: RASS Goal/Actual Goal: RASS Goal: 0-->alert and calm  Actual:     B: Spontaneous Breathing Trial Performed?     C: SAT & SBT Coordinated?  N/A                      D: Delirium: CAM-ICU Overall CAM-ICU: Negative   E: Early Mobility Performed? Yes   F: Feeding Goal:    Status:           Current Diet Order   Procedures    Diet full liquid      AS: Analgesia/Sedation Oxycontin   T: Thromboembolic Prophylaxis Enoxaparin   H: HOB > 300 Yes   U: Stress Ulcer Prophylaxis (if needed) PPI   G: Glucose Control SSI   B: Bowel Function Stool Occurrence: 1   I: Indwelling Catheter (Lines & Marcos) Necessity Marcos   D: De-escalation of Antimicrobials/Pharmacotherapies CTX/Flagyl     Plan for the day/ETD Wean pressors     Code Status:  Family/Goals of Care: DNR  Palliative  Consulted for GOC Discussion         Critical secondary to Patient has a condition that poses threat to life and bodily function: Septic Shock      Critical care was time spent personally by me on the following activities: development of treatment plan with patient or surrogate and bedside caregivers, discussions with consultants, evaluation of patient's response to treatment, examination of patient, ordering and performing treatments and interventions, ordering and review of laboratory studies, ordering and review of radiographic studies, pulse oximetry, re-evaluation of patient's condition. This critical care time did not overlap with that of any other provider or involve time for any procedures.     Bull Palmer MD  Critical Care Medicine  [facility] - Cardiac Medical ICU

## 2023-12-17 NOTE — PROCEDURES
"David Benjamin Jr. is a 58 y.o. male patient.    Temp: 97.4 °F (36.3 °C) (23 1105)  Pulse: 87 (23 1300)  Resp: 19 (23 1300)  BP: (Abnormal) 87/51 (23 1300)  SpO2: (Abnormal) 93 % (23 1300)  Weight: 113 kg (249 lb 1.9 oz) (23 0400)  Height: 6' 1" (185.4 cm) (12/15/23 1415)       Paracentesis    Date/Time: 2023 3:32 PM  Location procedure was performed: [facility] CRITICAL CARE MEDICINE    Performed by: Bull Palmer MD  Authorized by: Bull Palmer MD  Pre-operative diagnosis: Ascites 2/t carcinamatosis  Post-operative diagnosis: Ascites 2/t carcinamatosis  Consent Done: Yes  Consent: Verbal consent obtained. Written consent obtained.  Consent given by: patient  Patient understanding: patient states understanding of the procedure being performed  Patient consent: the patient's understanding of the procedure matches consent given  Procedure consent: procedure consent matches procedure scheduled  Relevant documents: relevant documents present and verified  Test results: test results available and properly labeled  Site marked: the operative site was marked  Imaging studies: imaging studies available  Patient identity confirmed: , MRN, name and verbally with patient  Time out: Immediately prior to procedure a "time out" was called to verify the correct patient, procedure, equipment, support staff and site/side marked as required.  Initial or subsequent exam: initial  Procedure purpose: therapeutic  Indications: abdominal discomfort secondary to ascites, respiratory distress secondary to ascites and suspected peritonitis  Anesthesia: local infiltration    Anesthesia:  Local Anesthetic: lidocaine 1% with epinephrine  Anesthetic total: 3 mL    Patient sedated: no  Preparation: Patient was prepped and draped in the usual sterile fashion.  Description of findings: sanguinous fluid   Needle gauge: 18  Ultrasound guidance: yes  Puncture site: left lower quadrant  Fluid removed: " 4100(ml)  Fluid appearance: serosanguinous  Dressing: tube gauze  Complications: Yes (Upon completion of paracentesis, gastric fluid began leaking from PEG tube.)  Estimated blood loss (mL): 1  Specimens: Yes (Labs ordered for ascitic fluid to check for SBP)  Patient tolerance: Patient tolerated the procedure well with no immediate complications          12/17/2023

## 2023-12-17 NOTE — PLAN OF CARE
MICU DAILY GOALS     Family/Goals of care/Code Status   Code Status: DNR    24H Vital Sign Range  Temp:  [97.5 °F (36.4 °C)-98.3 °F (36.8 °C)]   Pulse:  []   Resp:  [14-25]   BP: ()/(48-82)   SpO2:  [91 %-97 %]      Shift Events (include procedures and significant events)   No acute events throughout shift. Plan is to have a para done tomorrow at bedside.    AWAKE RASS: Goal - RASS Goal: 0-->alert and calm  Actual - RASS (Estrada Agitation-Sedation Scale): alert and calm    Restraint necessity: Not necessary   BREATHE SBT: Not intubated    Coordinate A & B, analgesics/sedatives Pain: managed   SAT: Not intubated   Delirium CAM-ICU: Overall CAM-ICU: Negative   Early(intubated/ Progressive (non-intubated) Mobility MOVE Screen (INTUBATED ONLY): Not intubated    Activity: Activity Management: Arm raise - L1, Leg kicks - L2   Feeding/Nutrition Diet order: Diet/Nutrition Received: full liquid,     Thrombus DVT prophylaxis: VTE Required Core Measure: Pharmacological prophylaxis initiated/maintained   HOB Elevation Head of Bed (HOB) Positioning: HOB at 30-45 degrees   Ulcer Prophylaxis GI: yes   Glucose control managed Glycemic Management: blood glucose monitored   Skin Skin assessed during: Daily Assessment    Sacrum intact/not altered? Yes  Heels intact/not altered? Yes  Surgical wound? No    Check one (no altered skin or altered skin) and sub boxes:  [] No Altered Skin Integrity Present    []Prevention Measures Documented    [] Altered Skin Integrity Present or Discovered   [] LDA present in EPIC              [] LDA added in EPIC   [] Wound Image Taken (required on admit,                   transfer/discharge and every Tuesday)    Wound Care Consulted? No    Attending Nurse:     Second RN/Staff Member:    Bowel Function no issues    Indwelling Catheter Necessity              De-escalation Antibiotics No       VS and assessment per flow sheet, patient progressing towards goals as tolerated, plan of care  reviewed with family, all concerns addressed, will continue to monitor.

## 2023-12-17 NOTE — PLAN OF CARE
MICU DAILY GOALS     Family/Goals of care/Code Status   Code Status: DNR    24H Vital Sign Range  Temp:  [97.2 °F (36.2 °C)-98.3 °F (36.8 °C)]   Pulse:  []   Resp:  [14-25]   BP: ()/(50-82)   SpO2:  [93 %-97 %]      Shift Events (include procedures and significant events)   Vasopressors titrated per mar and map goal greater than 60.     AWAKE RASS: Goal - RASS Goal: 0-->alert and calm  Actual - RASS (Estrada Agitation-Sedation Scale): alert and calm    Restraint necessity: Not necessary   BREATHE SBT: Not intubated    Coordinate A & B, analgesics/sedatives Pain: managed   SAT: Not intubated   Delirium CAM-ICU: Overall CAM-ICU: Negative   Early(intubated/ Progressive (non-intubated) Mobility MOVE Screen (INTUBATED ONLY): Not intubated    Activity: Activity Management: Arm raise - L1   Feeding/Nutrition Diet order: Diet/Nutrition Received: full liquid,     Thrombus DVT prophylaxis: VTE Required Core Measure: Pharmacological prophylaxis initiated/maintained   HOB Elevation Head of Bed (HOB) Positioning: HOB elevated   Ulcer Prophylaxis GI: yes   Glucose control managed Glycemic Management: blood glucose monitored   Skin Skin assessed during: Daily Assessment    Sacrum intact/not altered? Yes  Heels intact/not altered? Yes  Surgical wound? No    Check one (no altered skin or altered skin) and sub boxes:  [] No Altered Skin Integrity Present    []Prevention Measures Documented    [] Altered Skin Integrity Present or Discovered   [] LDA present in EPIC              [] LDA added in EPIC   [] Wound Image Taken (required on admit,                   transfer/discharge and every Tuesday)    Wound Care Consulted? No    Attending Nurse:     Second RN/Staff Member:    Bowel Function no issues    Indwelling Catheter Necessity         N/a   De-escalation Antibiotics Yes       VS and assessment per flow sheet, patient progressing towards goals as tolerated, plan of care reviewed with  patient .

## 2023-12-18 LAB
FINAL PATHOLOGIC DIAGNOSIS: ABNORMAL
Lab: ABNORMAL
POCT GLUCOSE: 200 MG/DL (ref 70–110)

## 2023-12-18 PROCEDURE — 99497 ADVNCD CARE PLAN 30 MIN: CPT | Mod: ,,, | Performed by: STUDENT IN AN ORGANIZED HEALTH CARE EDUCATION/TRAINING PROGRAM

## 2023-12-18 PROCEDURE — 25000003 PHARM REV CODE 250

## 2023-12-18 PROCEDURE — 94761 N-INVAS EAR/PLS OXIMETRY MLT: CPT

## 2023-12-18 PROCEDURE — 99291 PR CRITICAL CARE, E/M 30-74 MINUTES: ICD-10-PCS | Mod: ,,, | Performed by: INTERNAL MEDICINE

## 2023-12-18 PROCEDURE — 20000000 HC ICU ROOM

## 2023-12-18 PROCEDURE — 25000003 PHARM REV CODE 250: Performed by: NURSE PRACTITIONER

## 2023-12-18 PROCEDURE — 99497 PR ADVNCD CARE PLAN 30 MIN: ICD-10-PCS | Mod: ,,, | Performed by: STUDENT IN AN ORGANIZED HEALTH CARE EDUCATION/TRAINING PROGRAM

## 2023-12-18 PROCEDURE — 63600175 PHARM REV CODE 636 W HCPCS

## 2023-12-18 PROCEDURE — 99233 SBSQ HOSP IP/OBS HIGH 50: CPT | Mod: ,,, | Performed by: STUDENT IN AN ORGANIZED HEALTH CARE EDUCATION/TRAINING PROGRAM

## 2023-12-18 PROCEDURE — 63600175 PHARM REV CODE 636 W HCPCS: Performed by: NURSE PRACTITIONER

## 2023-12-18 PROCEDURE — 99291 CRITICAL CARE FIRST HOUR: CPT | Mod: ,,, | Performed by: INTERNAL MEDICINE

## 2023-12-18 PROCEDURE — 63600175 PHARM REV CODE 636 W HCPCS: Performed by: STUDENT IN AN ORGANIZED HEALTH CARE EDUCATION/TRAINING PROGRAM

## 2023-12-18 PROCEDURE — 99233 PR SUBSEQUENT HOSPITAL CARE,LEVL III: ICD-10-PCS | Mod: ,,, | Performed by: STUDENT IN AN ORGANIZED HEALTH CARE EDUCATION/TRAINING PROGRAM

## 2023-12-18 RX ORDER — HYDROMORPHONE HYDROCHLORIDE 1 MG/ML
0.5 INJECTION, SOLUTION INTRAMUSCULAR; INTRAVENOUS; SUBCUTANEOUS
Status: DISCONTINUED | OUTPATIENT
Start: 2023-12-18 | End: 2023-12-19 | Stop reason: HOSPADM

## 2023-12-18 RX ORDER — NOREPINEPHRINE BITARTRATE/D5W 4MG/250ML
PLASTIC BAG, INJECTION (ML) INTRAVENOUS
Status: COMPLETED
Start: 2023-12-18 | End: 2023-12-18

## 2023-12-18 RX ORDER — NOREPINEPHRINE BITARTRATE/D5W 4MG/250ML
0-.24 PLASTIC BAG, INJECTION (ML) INTRAVENOUS CONTINUOUS
Status: DISCONTINUED | OUTPATIENT
Start: 2023-12-18 | End: 2023-12-19

## 2023-12-18 RX ADMIN — HYDROMORPHONE HYDROCHLORIDE 0.5 MG: 0.5 INJECTION, SOLUTION INTRAMUSCULAR; INTRAVENOUS; SUBCUTANEOUS at 06:12

## 2023-12-18 RX ADMIN — NOREPINEPHRINE BITARTRATE 0.24 MCG/KG/MIN: 4 INJECTION, SOLUTION INTRAVENOUS at 09:12

## 2023-12-18 RX ADMIN — HYDROCORTISONE SODIUM SUCCINATE 100 MG: 100 INJECTION, POWDER, FOR SOLUTION INTRAMUSCULAR; INTRAVENOUS at 08:12

## 2023-12-18 RX ADMIN — MORPHINE SULFATE 4 MG: 2 INJECTION, SOLUTION INTRAMUSCULAR; INTRAVENOUS at 10:12

## 2023-12-18 RX ADMIN — CEFTRIAXONE 2 G: 2 INJECTION, POWDER, FOR SOLUTION INTRAMUSCULAR; INTRAVENOUS at 05:12

## 2023-12-18 RX ADMIN — HYDROCORTISONE SODIUM SUCCINATE 100 MG: 100 INJECTION, POWDER, FOR SOLUTION INTRAMUSCULAR; INTRAVENOUS at 05:12

## 2023-12-18 RX ADMIN — HYDROMORPHONE HYDROCHLORIDE 0.5 MG: 0.5 INJECTION, SOLUTION INTRAMUSCULAR; INTRAVENOUS; SUBCUTANEOUS at 02:12

## 2023-12-18 RX ADMIN — HYDROMORPHONE HYDROCHLORIDE 0.5 MG: 0.5 INJECTION, SOLUTION INTRAMUSCULAR; INTRAVENOUS; SUBCUTANEOUS at 09:12

## 2023-12-18 RX ADMIN — NOREPINEPHRINE BITARTRATE 0.24 MCG/KG/MIN: 4 INJECTION, SOLUTION INTRAVENOUS at 05:12

## 2023-12-18 RX ADMIN — HYDROCORTISONE SODIUM SUCCINATE 100 MG: 100 INJECTION, POWDER, FOR SOLUTION INTRAMUSCULAR; INTRAVENOUS at 02:12

## 2023-12-18 RX ADMIN — METRONIDAZOLE 500 MG: 5 INJECTION, SOLUTION INTRAVENOUS at 08:12

## 2023-12-18 RX ADMIN — MORPHINE SULFATE 2 MG: 2 INJECTION, SOLUTION INTRAMUSCULAR; INTRAVENOUS at 05:12

## 2023-12-18 RX ADMIN — NOREPINEPHRINE BITARTRATE 0.24 MCG/KG/MIN: 4 INJECTION, SOLUTION INTRAVENOUS at 07:12

## 2023-12-18 RX ADMIN — NOREPINEPHRINE BITARTRATE 0.28 MCG/KG/MIN: 4 INJECTION, SOLUTION INTRAVENOUS at 12:12

## 2023-12-18 RX ADMIN — NOREPINEPHRINE BITARTRATE 0.28 MCG/KG/MIN: 4 INJECTION, SOLUTION INTRAVENOUS at 02:12

## 2023-12-18 RX ADMIN — GLYCOPYRROLATE 0.1 MG: 0.2 INJECTION INTRAMUSCULAR; INTRAVENOUS at 02:12

## 2023-12-18 NOTE — SUBJECTIVE & OBJECTIVE
Interval History/Significant Events: Vasopressor requirements increasing. S/p therapeutic paracentesis, overall looks much more comfortable today. Transitioned to comfort care, is wishing for home hospice at this time.     Review of Systems   Constitutional:  Positive for activity change and fatigue.   HENT:  Negative for congestion and sore throat.    Eyes:  Negative for visual disturbance.   Respiratory: Negative.  Negative for cough, choking and chest tightness.    Cardiovascular:  Positive for leg swelling. Negative for chest pain.   Gastrointestinal:  Positive for abdominal distention and abdominal pain. Negative for blood in stool, constipation and diarrhea.   Genitourinary: Negative.  Negative for flank pain.   Musculoskeletal: Negative.    Skin:  Positive for pallor.   Neurological:  Negative for weakness and headaches.   Hematological:  Negative for adenopathy.   Psychiatric/Behavioral:  Negative for agitation and behavioral problems.      Objective:     Vital Signs (Most Recent):  Temp: 97.2 °F (36.2 °C) (12/18/23 0700)  Pulse: 88 (12/18/23 1000)  Resp: 19 (12/18/23 1040)  BP: (!) 89/53 (12/18/23 1000)  SpO2: 96 % (12/18/23 1000) Vital Signs (24h Range):  Temp:  [97.1 °F (36.2 °C)-97.4 °F (36.3 °C)] 97.2 °F (36.2 °C)  Pulse:  [71-97] 88  Resp:  [14-25] 19  SpO2:  [87 %-99 %] 96 %  BP: ()/(38-62) 89/53   Weight: 113 kg (249 lb 1.9 oz)  Body mass index is 32.87 kg/m².      Intake/Output Summary (Last 24 hours) at 12/18/2023 1136  Last data filed at 12/18/2023 1000  Gross per 24 hour   Intake 2054.75 ml   Output 4860 ml   Net -2805.25 ml            Physical Exam  Vitals reviewed.   Constitutional:       General: He is not in acute distress.     Appearance: He is ill-appearing. He is not toxic-appearing.   HENT:      Nose: Nose normal.      Mouth/Throat:      Mouth: Mucous membranes are moist.      Pharynx: Oropharynx is clear.   Eyes:      Extraocular Movements: Extraocular movements intact.       Conjunctiva/sclera: Conjunctivae normal.   Cardiovascular:      Rate and Rhythm: Normal rate and regular rhythm.      Pulses:           Radial pulses are 2+ on the right side and 2+ on the left side.        Dorsalis pedis pulses are 2+ on the right side and 2+ on the left side.      Heart sounds: No murmur heard.  Pulmonary:      Effort: Pulmonary effort is normal. No respiratory distress.      Breath sounds: No wheezing.   Abdominal:      General: Bowel sounds are normal. There is distension.      Tenderness: There is generalized abdominal tenderness. There is no right CVA tenderness, left CVA tenderness, guarding or rebound. Negative signs include Sanford's sign.      Comments: PEG tube in LUQ  Severe abdominal distention  Diffuse abdominal tenderness    Musculoskeletal:         General: Swelling and tenderness present.      Cervical back: No tenderness.      Right lower leg: Edema present.      Left lower leg: Edema present.   Skin:     Capillary Refill: Capillary refill takes less than 2 seconds.      Coloration: Skin is not jaundiced or pale.   Neurological:      Mental Status: He is alert and oriented to person, place, and time. Mental status is at baseline.            Vents:     Lines/Drains/Airways       Drain  Duration                  Gastrostomy/Enterostomy 12/12/23 2053 LUQ 5 days              Peripheral Intravenous Line  Duration                  Peripheral IV - Single Lumen 12/13/23 0945 16 G Anterior;Distal;Left Antecubital 5 days         Peripheral IV - Single Lumen 12/13/23 2112 18 G Anterior;Left Upper Arm 4 days         Peripheral IV - Single Lumen 12/16/23 0248 16 G Anterior;Left;Proximal Upper Arm 2 days                  Significant Labs:    CBC/Anemia Profile:  Recent Labs   Lab 12/17/23  0242   WBC 21.19*   HGB 10.0*   HCT 31.4*      MCV 84   RDW 14.8*          Chemistries:  Recent Labs   Lab 12/17/23  0242   *   K 5.0   CL 94*   CO2 18*   BUN 47*   CREATININE 1.9*   CALCIUM 7.9*    ALBUMIN 1.8*   PROT 5.2*   BILITOT 0.2   ALKPHOS 61   ALT 11   AST 36   MG 2.3   PHOS 4.2         All pertinent labs within the past 24 hours have been reviewed.    Significant Imaging:  I have reviewed all pertinent imaging results/findings within the past 24 hours.

## 2023-12-18 NOTE — ASSESSMENT & PLAN NOTE
Nutrition consulted. Most recent weight and BMI monitored-     Measurements:  Wt Readings from Last 1 Encounters:   12/16/23 113 kg (249 lb 1.9 oz)   Body mass index is 32.87 kg/m².    Patient has been screened and assessed by RD.    Malnutrition Type:  Context: acute illness or injury  Level: moderate    Malnutrition Characteristic Summary:  Weight Loss (Malnutrition): other (see comments) (10% x 1 month)  Energy Intake (Malnutrition): less than 75% for greater than 7 days  Muscle Mass (Malnutrition): mild depletion    Interventions/Recommendations (treatment strategy):  1. Continue full liquid diet, advance diet per MD  2. Rec'd ONS [employer] standard 1.4 (vanilla) x 5 cans per day to provide 2275 kcal, 100g pro, 1170ml FW   3. RD to monitor and follow

## 2023-12-18 NOTE — ASSESSMENT & PLAN NOTE
Abdominal pain + distention with ascites noted on CT imaging. Diagnostic paracentesis performed, SBP unlikely per lab results. CTX and Vanc continued in setting of septic shock with vasopressors being weaned accordingly. 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  -- Continue abx as indicated  -- s/p therapeutic para if abdominal pain not controlled  -- PRN analgesia for acute pain

## 2023-12-18 NOTE — ASSESSMENT & PLAN NOTE
Insight/goals of care- good insight despite acuity of decline and new diagnosis of incurable cancer. Remains hopeful he can improve to the point where he might benefit from palliative chemo but understands the limitations if he continues to decline. Clear limits on care if he appears to be dying.      Social support- supportive wife and parents (still living) has may friends and strong Sabianism community in [place]     Psychological- fair coping strategies given gravity of recent developments      Spiritual- Congregational, spiritual needs met by local community     Symptom management- cancer-related pain (previously on tramadol, oxy5/10s), also with hiccups and nausea     Recommendations  -DNR, Transitioned to comfort measures  -will continue to engage on other limits he might place on his care (did not specifically address CELY or HD)  -recommend trial of chlorpromazine 25mg IV q8hr PRN for uncomfortable hiccups/nausea

## 2023-12-18 NOTE — PROGRESS NOTES
[facility] - Cardiac Medical ICU  Critical Care Medicine  Progress Note    Patient Name: David Benjamin Jr.  MRN: [id number]  Admission Date: 12/12/2023  Hospital Length of Stay: 6 days  Code Status: DNR  Attending Provider: Tee Valles MD  Primary Care Provider: Codi Chavez FNP   Principal Problem: Septic shock    Subjective:     HPI:  Mr. Benjamin is a 59yo M with Pmhx of recently diagnosed neuroendocrine stomach mass, with recent PEG placement at OSH, presenting with complaint of difficulty using his PEG. Also complaining of abdominal pain, distention, and drainage at the PEG site. These problems have been progressively worsening over the last week since the PEG was placed. In the ED, he was found to be hypotensive, tachycardic, tachypneic, with an elevated WBC count and PERLA. Given sepsis fluid bolus, CTX, Flagyl and started on Levophed for persistent hypotension. CT Abd/pelvis performed without evidence of abscess or malpositioned PEG tube. Mass and metastatic lesions present as well as moderate ascites. Admitted to MICU with concerns for septic shock requiring vasopressors.     Hospital/ICU Course:  Pt continued on peripheral vasopressors. Gen Surg consulted for problematoc PEG tube, appreciate recs. PEG tube noted to be in appropriate position with gastric lumen is compressed. PEG aspiration likely 2/t position against posterior wall of the stomach. Gen Surg recommended not to use PEG for tube feeds, but will need to remain in place for 6 weeks s/p placement, prior to consideration for removal. Peripheral norepinephrine weaned accordingly. Pt advanced to full liquid diet and tolerating without aspiration. Pain management adjusted to 12.5mcg with fentanyl patch. Oxycodone 5mg PO soln PRN for mod breakthru pain. Hydromorphone 0.5mg IV PRN for severe breakthru pain. Pt continued on IVF in setting of PERLA, Scr improving. Pt more vasopressor support to maintain MAPs > 65. Adjusted analgesics to PO meds  such that they could be prescribed on discharge. Pt is hypervolemic, +8.5L. Increased abdominal distention with tenderness to palpation noted. Vasopressor requirements increasing. Severe ascites present on abdominal US, now s/p therapeutic paracentesis. Patient transitioned to comfort care evening of 12/18. Ms Benjamin and his wife are seeking hospice options at this time.    Interval History/Significant Events: Vasopressor requirements increasing. S/p therapeutic paracentesis, overall looks much more comfortable today. Transitioned to comfort care, is wishing for home hospice at this time.     Review of Systems   Constitutional:  Positive for activity change and fatigue.   HENT:  Negative for congestion and sore throat.    Eyes:  Negative for visual disturbance.   Respiratory: Negative.  Negative for cough, choking and chest tightness.    Cardiovascular:  Positive for leg swelling. Negative for chest pain.   Gastrointestinal:  Positive for abdominal distention and abdominal pain. Negative for blood in stool, constipation and diarrhea.   Genitourinary: Negative.  Negative for flank pain.   Musculoskeletal: Negative.    Skin:  Positive for pallor.   Neurological:  Negative for weakness and headaches.   Hematological:  Negative for adenopathy.   Psychiatric/Behavioral:  Negative for agitation and behavioral problems.      Objective:     Vital Signs (Most Recent):  Temp: 97.2 °F (36.2 °C) (12/18/23 0700)  Pulse: 88 (12/18/23 1000)  Resp: 19 (12/18/23 1040)  BP: (!) 89/53 (12/18/23 1000)  SpO2: 96 % (12/18/23 1000) Vital Signs (24h Range):  Temp:  [97.1 °F (36.2 °C)-97.4 °F (36.3 °C)] 97.2 °F (36.2 °C)  Pulse:  [71-97] 88  Resp:  [14-25] 19  SpO2:  [87 %-99 %] 96 %  BP: ()/(38-62) 89/53   Weight: 113 kg (249 lb 1.9 oz)  Body mass index is 32.87 kg/m².      Intake/Output Summary (Last 24 hours) at 12/18/2023 1136  Last data filed at 12/18/2023 1000  Gross per 24 hour   Intake 2054.75 ml   Output 4860 ml   Net  -2805.25 ml            Physical Exam  Vitals reviewed.   Constitutional:       General: He is not in acute distress.     Appearance: He is ill-appearing. He is not toxic-appearing.   HENT:      Nose: Nose normal.      Mouth/Throat:      Mouth: Mucous membranes are moist.      Pharynx: Oropharynx is clear.   Eyes:      Extraocular Movements: Extraocular movements intact.      Conjunctiva/sclera: Conjunctivae normal.   Cardiovascular:      Rate and Rhythm: Normal rate and regular rhythm.      Pulses:           Radial pulses are 2+ on the right side and 2+ on the left side.        Dorsalis pedis pulses are 2+ on the right side and 2+ on the left side.      Heart sounds: No murmur heard.  Pulmonary:      Effort: Pulmonary effort is normal. No respiratory distress.      Breath sounds: No wheezing.   Abdominal:      General: Bowel sounds are normal. There is distension.      Tenderness: There is generalized abdominal tenderness. There is no right CVA tenderness, left CVA tenderness, guarding or rebound. Negative signs include Sanford's sign.      Comments: PEG tube in LUQ  Severe abdominal distention  Diffuse abdominal tenderness    Musculoskeletal:         General: Swelling and tenderness present.      Cervical back: No tenderness.      Right lower leg: Edema present.      Left lower leg: Edema present.   Skin:     Capillary Refill: Capillary refill takes less than 2 seconds.      Coloration: Skin is not jaundiced or pale.   Neurological:      Mental Status: He is alert and oriented to person, place, and time. Mental status is at baseline.            Vents:     Lines/Drains/Airways       Drain  Duration                  Gastrostomy/Enterostomy 12/12/23 2053 LUQ 5 days              Peripheral Intravenous Line  Duration                  Peripheral IV - Single Lumen 12/13/23 0945 16 G Anterior;Distal;Left Antecubital 5 days         Peripheral IV - Single Lumen 12/13/23 2112 18 G Anterior;Left Upper Arm 4 days          "Peripheral IV - Single Lumen 12/16/23 0248 16 G Anterior;Left;Proximal Upper Arm 2 days                  Significant Labs:    CBC/Anemia Profile:  Recent Labs   Lab 12/17/23 0242   WBC 21.19*   HGB 10.0*   HCT 31.4*      MCV 84   RDW 14.8*          Chemistries:  Recent Labs   Lab 12/17/23 0242   *   K 5.0   CL 94*   CO2 18*   BUN 47*   CREATININE 1.9*   CALCIUM 7.9*   ALBUMIN 1.8*   PROT 5.2*   BILITOT 0.2   ALKPHOS 61   ALT 11   AST 36   MG 2.3   PHOS 4.2         All pertinent labs within the past 24 hours have been reviewed.    Significant Imaging:  I have reviewed all pertinent imaging results/findings within the past 24 hours.    ABG  No results for input(s): "PH", "PO2", "PCO2", "HCO3", "BE" in the last 168 hours.  Assessment/Plan:     Pulmonary  Hiccups  Pt with reflux and hiccups likely 2/t esophageal carcinoma and large volume ascites with stomach compression  -- Chlorpromazine 25mg IV q8hr PRN for uncomfortable hiccups/nausea    Cardiac/Vascular  Acute hypotension  Pt has not been able to be weaned off peripheral norepinephrine since arrival in MICU. Increased vasopressor support needed to maintain MAPs in setting of extensive metastatic disease. TTE negative for acute change in cardiac function. Oncology and Palliative Care consulted, appreciate recs.  --Continue vasopressor support as indicated    Renal/  PERLA (acute kidney injury)  Patient with PERLA on admission, likely pre-renal in etiology in setting of decreased intake with PEG tube  dysfunction.      Cr at admission: 1.7  Cr at baseline: 0.7  Estimated Creatinine Clearance: 54.4 mL/min (A) (based on SCr of 1.9 mg/dL (H)).     -- Withdrawing labs 2/2 comfort measures  -- s/p sepsis fluid bolus in ED  -- Renally dose medications  -- avoid nephrotoxic agents   -- Strict I&O   -- MAP > 65. Vasopressors   -- Hgb > 7    ID  * Septic shock  This patient does have evidence of infective focus  My overall impression is septic shock due to MAP < " "65 and SBP < 90.  Source: Abdominal    Antibiotics given- CTX and Flagyl given in ED. Will broaden coverage to Vanc/Zosyn and de-escalate as appropriate.   Antibiotics (72h ago, onward)      Start     Stop Route Frequency Ordered    12/15/23 0900  mupirocin 2 % ointment         12/20/23 0859 Nasl 2 times daily 12/15/23 0621    12/14/23 2100  metronidazole IVPB 500 mg         -- IV Every 12 hours (non-standard times) 12/14/23 1323    12/13/23 1700  cefTRIAXone (ROCEPHIN) 2 g in dextrose 5 % in water (D5W) 100 mL IVPB (MB+)         -- IV Every 24 hours (non-standard times) 12/13/23 0034          Latest lactate reviewed-  No results for input(s): "LACTATE", "POCLAC" in the last 72 hours.    Organ dysfunction indicated by Acute kidney injury    Fluid challenge Actual Body weight- Patient will receive 30ml/kg actual body weight to calculate fluid bolus for treatment of septic shock.     Will Start Pressors- Levophed for MAP of 65  Source control achieved by: Abx.     -- Withdrew abx, pt transitioning to comfort only  -- Follow up blood cultures , ngtd  -- Diagnostic para at bedside. Follow up cultures, ngtd  -- Vasopressors for MAP > 65      Oncology  Cancer related pain  Analgesia pescribed for cancer related pain in setting of dysphagia  -- Fentanyl 12.5mcg patch  -- Oxycodone 5mg q2h PRN for moderate pain  -- Dilaudid 0.5mg q4h PRN for severe pain    Primary neuroendocrine carcinoma of stomach  [place] [facility] [place]. Plan for chemo initiation this month as below.   12/18/2023:       Chemotherapy       CARBOplatin (PARAPLATIN) in sodium chloride 0.9% 250 mL chemo infusion       etoposide (VEPESID) 100 mg/m2 = 230 mg in sodium chloride 0.9% 511.5 mL chemo infusion    Per most recent note: "Long discussion with patient and wife re the diagnosis, natural history and prognosis of stage IV gastric NEC. Cancer is not curable but treatable. The goal of treatment is palliative, with hopes of containing the cancer, " "slowing down growth, and prolonging life."    Endocrine  Moderate malnutrition  Nutrition consulted. Most recent weight and BMI monitored-     Measurements:  Wt Readings from Last 1 Encounters:   12/16/23 113 kg (249 lb 1.9 oz)   Body mass index is 32.87 kg/m².    Patient has been screened and assessed by RD.    Malnutrition Type:  Context: acute illness or injury  Level: moderate    Malnutrition Characteristic Summary:  Weight Loss (Malnutrition): other (see comments) (10% x 1 month)  Energy Intake (Malnutrition): less than 75% for greater than 7 days  Muscle Mass (Malnutrition): mild depletion    Interventions/Recommendations (treatment strategy):  1. Continue full liquid diet, advance diet per MD  2. Rec'd ONS [employer] standard 1.4 (vanilla) x 5 cans per day to provide 2275 kcal, 100g pro, 1170ml FW   3. RD to monitor and follow      GI  Ascites  Abdominal pain + distention with ascites noted on CT imaging. Diagnostic paracentesis performed, SBP unlikely per lab results. CTX and Vanc continued in setting of septic shock with vasopressors being weaned accordingly. 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  -- Continue abx as indicated  -- s/p therapeutic para if abdominal pain not controlled  -- PRN analgesia for acute pain      PEG tube malfunction  Patient with PEG tube placed 1 week ago at OSH for dysphagia in setting of stomach/esophageal mass. PEG tube flushed successfully with minimal volume, tube aspiration appreciated. Consult GI vs Gen Surg, appreciate recs. PEG tube correctly placed but balloon is compressed against posterior wall of the stomach. Pt recommended to discontinue use. PEG tube exchange will not improve functionality. Pt advanced to full liquid diet and tolerating well.  -- Continue full liquid diet as tolerated    Mass of gastroesophageal junction  Recently discovered mass of stomach/distal esophagus. EGD biopsy " "diagnosed high grade neuroendocrine tumor.     -- S/p PEG placement approximately 1 week ago at OSH for persistent dysphagia not functioning, do not use  -- Had PET scan today, demonstrating "Extensive lower thoracic and abdominal lymphadenopathy with smaller lymph nodes of the chest.  There is also extensive peritoneal omental metastases and there are bone metastases."   -- Per record review, saw Dr. Lopez with hematology/oncology today.     Palliative Care  DNR (do not resuscitate)  DNR    Comfort measures only status  - Pt transitioned to comfort measures    Palliative care encounter  Insight/goals of care- good insight despite acuity of decline and new diagnosis of incurable cancer. Remains hopeful he can improve to the point where he might benefit from palliative chemo but understands the limitations if he continues to decline. Clear limits on care if he appears to be dying.      Social support- supportive wife and parents (still living) has may friends and strong Evangel[employer] community in [place]     Psychological- fair coping strategies given gravity of recent developments      Spiritual- Sabianist, spiritual needs met by local community     Symptom management- cancer-related pain (previously on tramadol, oxy5/10s), also with hiccups and nausea     Recommendations  -DNR, Transitioned to comfort measures  -will continue to engage on other limits he might place on his care (did not specifically address CELY or HD)  -recommend trial of chlorpromazine 25mg IV q8hr PRN for uncomfortable hiccups/nausea       Critical Care Daily Checklist:    A: Awake: RASS Goal/Actual Goal: RASS Goal: 0-->alert and calm  Actual:     B: Spontaneous Breathing Trial Performed?     C: SAT & SBT Coordinated?                     D: Delirium: CAM-ICU Overall CAM-ICU: Negative   E: Early Mobility Performed? Yes   F: Feeding Goal: Goals: Meet % EEN, EPN by RD f/u  Status: Nutrition Goal Status: new   Current Diet Order   Procedures    Diet " full liquid      AS: Analgesia/Sedation Pain managed, comfort measures   T: Thromboembolic Prophylaxis no   H: HOB > 300 Yes   U: Stress Ulcer Prophylaxis (if needed) No ppi   G: Glucose Control Withdrawing labs   B: Bowel Function Stool Occurrence: 0   I: Indwelling Catheter (Lines & Marcos) Necessity no   D: De-escalation of Antimicrobials/Pharmacotherapies Transitioned to comfort    Plan for the day/ETD hospice    Code Status:  Family/Goals of Care: DNR        Critical care was time spent personally by me on the following activities: development of treatment plan with patient or surrogate and bedside caregivers, discussions with consultants, evaluation of patient's response to treatment, examination of patient, ordering and performing treatments and interventions, ordering and review of laboratory studies, ordering and review of radiographic studies, pulse oximetry, re-evaluation of patient's condition. This critical care time did not overlap with that of any other provider or involve time for any procedures.     Loco Sharma MD  Critical Care Medicine  [facility] - Cardiac Medical ICU

## 2023-12-18 NOTE — ASSESSMENT & PLAN NOTE
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 Pain is primarily aching, stabbing, occasionally burning. Had acceptable control prior to admit with oxycodone 5mg using 2-3x per day, now with acute worsening of baseline pain    12/18 41> 125 OME> ~60    Recommendations  -patient tolerating oral liquids for now, notes was tolerating small pills (senna, oxycodone prior to admit)  -would increase oxycodone oral liquid to 7.5mg q4hr PRN for pain, dyspnea, or pain behaviors  -continue hydromorphone 0.5mg (10 OME/dose) IV q1hr PRN for severe breakthrough pain  -morphine more dependent on renal clearance and may stack in the setting of PERLA

## 2023-12-18 NOTE — ASSESSMENT & PLAN NOTE
See ACP 12/13-12/18    Insight/goals of care- good insight despite acuity of decline and new diagnosis of incurable cancer. Wife intellectualizing his decline but understands he is dying without options to treat his cancer. Patient previously clear that he would not want his dying process prolonged with advanced life support.     Social support- supportive wife and parents (still living) has may friends and strong Sabianist community in [place]    Psychological- fair coping strategies given gravity of recent developments     Spiritual- Catholic, spiritual needs met by local community    Symptom management- cancer-related pain (previously on tramadol, oxy5/10s), also with hiccups and nausea    Recommendations  -DNR, continue vasopressors while attempts being made to transfer to facility near [place]   -would not escalate or add pressors if continues to decline   -if transfer not an option, will continue to engage about weaning vasopressors and allowing natural and peaceful death  -continue chlorpromazine 25mg IV q8hr PRN for uncomfortable hiccups/nausea  -see cancer-related pain

## 2023-12-18 NOTE — PLAN OF CARE
Inpatient hospice referral sent via [facility]port to [facility].  Per family's request.      Dimple Francois RN     [phone]

## 2023-12-18 NOTE — ASSESSMENT & PLAN NOTE
Patient with PERLA on admission, likely pre-renal in etiology in setting of decreased intake with PEG tube  dysfunction.      Cr at admission: 1.7  Cr at baseline: 0.7  Estimated Creatinine Clearance: 54.4 mL/min (A) (based on SCr of 1.9 mg/dL (H)).     -- Withdrawing labs 2/2 comfort measures  -- s/p sepsis fluid bolus in ED  -- Renally dose medications  -- avoid nephrotoxic agents   -- Strict I&O   -- MAP > 65. Vasopressors   -- Hgb > 7

## 2023-12-18 NOTE — PROGRESS NOTES
[facility] - [facility]  Palliative Medicine  Progress Note    Patient Name: David Benjamin Jr.  MRN: [id number]  Admission Date: 12/12/2023  Hospital Length of Stay: 6 days  Code Status: DNR   Attending Provider: Tee Valles MD  Consulting Provider: Rafal Heller MD  Primary Care Physician: Codi Chavez FNP  Principal Problem:Septic shock    Patient information was obtained from patient, spouse/SO, past medical records, and primary team.      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 Pain is primarily aching, stabbing, occasionally burning. Had acceptable control prior to admit with oxycodone 5mg using 2-3x per day, now with acute worsening of baseline pain    12/18 41> 125 OME> ~60    Recommendations  -patient tolerating oral liquids for now, notes was tolerating small pills (senna, oxycodone prior to admit)  -would increase oxycodone oral liquid to 7.5mg q4hr PRN for pain, dyspnea, or pain behaviors  -continue hydromorphone 0.5mg (10 OME/dose) IV q1hr PRN for severe breakthrough pain  -morphine more dependent on renal clearance and may stack in the setting of PERLA      Primary neuroendocrine carcinoma of stomach  58 year old male with recent diagnosis of now metastatic neuroendocrine carcinoma. Presumed gastric in origin but no distinguishing primary features on path. Admitted for presumed septic shock. Independent prior to this decline but had recent PEG tube due to dysphagia. Planned to initiate carbo/etoposide with Dr Lopez prior to this admit.     -likely too ill to benefit from any cancer-directed therapies, likely will remain in the hospital vs transfer to inpatient hospice in [place] for EOL care    Palliative Care  Palliative care encounter  See ACP 12/13-12/18    Insight/goals of care- good insight despite acuity of decline and new  diagnosis of incurable cancer. Wife intellectualizing his decline but understands he is dying without options to treat his cancer. Patient previously clear that he would not want his dying process prolonged with advanced life support.     Social support- supportive wife and parents (still living) has may friends and strong Gnosticist community in [place]    Psychological- fair coping strategies given gravity of recent developments     Spiritual- Sikhism, spiritual needs met by local community    Symptom management- cancer-related pain (previously on tramadol, oxy5/10s), also with hiccups and nausea    Recommendations  -DNR, continue vasopressors while attempts being made to transfer to facility near [place]   -would not escalate or add pressors if continues to decline   -if transfer not an option, will continue to engage about weaning vasopressors and allowing natural and peaceful death  -continue chlorpromazine 25mg IV q8hr PRN for uncomfortable hiccups/nausea  -see cancer-related pain        I will follow-up with patient. Please contact us if you have any additional questions.    Subjective:     Chief Complaint:   Chief Complaint   Patient presents with    Multiple complaints     Sent from [facility] , not on chemo at present, abnormal kidney function, need to check feeding tube       HPI:   Mr. Benjamin is a 58 year old male with ercently diagnosed neuroendocrine gastric cancer with rapid progression including widely metastatic disease (ribs, sacrum, omentum/peritoneal, thoracic and abdominal LN) on recent PET 12/12, who presented the same day from oncology clinic with generalized weakness and malfunctioning PEG tube (recently placed at OSH).  He was found to be hypotensive, tachycardic, tachypneic, with an elevated WBC count and PERLA. Admitted to MICU, started on antibiotics and vasopressors. Palliative care consulted to discuss goals of care and assist with symptom management    Hospital Course:  No notes on  file    Interval History: Chart reviewed including 24h medication use. Patient resting in bed, wife asks that I not wake him for exam and interview. Discussion at bedside below.     Pain medicine past 24hrs: oxycodone 5mg x2 (15 OME), morphine 2mg IV x2 (12 OME), 4mg IV x2 (24 OME)      Medications:  Continuous Infusions:   NORepinephrine bitartrate-D5W 0.24 mcg/kg/min (12/18/23 1300)     Scheduled Meds:   cefTRIAXone (ROCEPHIN) IVPB  2 g Intravenous Q24H    enoxparin  40 mg Subcutaneous Q24H (prophylaxis, 1700)    fludrocortisone  100 mcg Oral Daily    hydrocortisone sodium succinate  100 mg Intravenous Q8H    methylnaltrexone  12 mg Subcutaneous Daily    metronidazole  500 mg Intravenous Q12H    mupirocin   Nasal BID     PRN Meds:0.9%  NaCl infusion (for blood administration), chlorproMAZINE, dextrose 10%, dextrose 10%, glucagon (human recombinant), glycopyrrolate, insulin aspart U-100, LIDOcaine, lorazepam, morphine, morphine, OLANZapine, ondansetron, oxyCODONE, sodium chloride 0.9%    Objective:     Vital Signs (Most Recent):  Temp: 97.2 °F (36.2 °C) (12/18/23 0700)  Pulse: 101 (12/18/23 1300)  Resp: (!) 22 (12/18/23 1300)  BP: (!) 105/54 (12/18/23 1300)  SpO2: 98 % (12/18/23 1300) Vital Signs (24h Range):  Temp:  [97.1 °F (36.2 °C)-97.4 °F (36.3 °C)] 97.2 °F (36.2 °C)  Pulse:  [] 101  Resp:  [14-25] 22  SpO2:  [87 %-99 %] 98 %  BP: ()/(38-62) 105/54     Weight: 113 kg (249 lb 1.9 oz)  Body mass index is 32.87 kg/m².       Physical Exam  Vitals and nursing note reviewed.   Constitutional:       Appearance: He is ill-appearing.      Comments: Older, ill-appearing male lying in bed in no acute distress; somnolent, did not attempt to awaken. No observed pain behaviors   HENT:      Mouth/Throat:      Mouth: Mucous membranes are moist.   Eyes:      General: No scleral icterus.     Extraocular Movements: Extraocular movements intact.   Pulmonary:      Effort: Pulmonary effort is normal. No respiratory  "distress.      Comments: Normal work of breathing  Abdominal:      General: Abdomen is flat.   Skin:     General: Skin is warm and dry.   Neurological:      Comments: Somnolent, sleeping, did not awaken   Psychiatric:      Comments: Not agitated                Advance Care Planning  Advance Directives:   Do Not Resuscitate Status: Yes      Decision Making:  Family answered questions  Goals of Care: Engaged patient's wife at bedside in goals of care conversation. Reviewed our hope that we would be able to reverse underlying causes of his ongoing shock to get patient strong enough to tolerate life-prolonging cancer treatment. Expressed my worry that despite all of the care we have been providing, patient only seems to be worsening, precluding any treatment in the near future and severely limiting his life expectancy. Adela expresses that she is struggling to accept this, stating "there must be something we are missing that could get him better", listing his low chloride, hemoglobin, occult infections, etc. I explained that that none of these abnormalities explain how sick he is, and that the major contributor to his clinical decline is his aggressive cancer. She understands and accepts this, noting that, "if he is dying soon, he needs to be closer to home" and she requests we attempt to transfer his care closer to home before weaning vasopressors. While I noted that we would explore this option, we would not escalate his care to keep him alive in the event he worsens further prior to potential transfer      CBC:   Recent Labs   Lab 12/17/23  0242   WBC 21.19*   HGB 10.0*   HCT 31.4*   MCV 84        BMP:  No results for input(s): "GLU", "NA", "K", "CL", "CO2", "BUN", "CREATININE", "CALCIUM", "MG" in the last 24 hours.  LFT:  Lab Results   Component Value Date    AST 36 12/17/2023    ALKPHOS 61 12/17/2023    BILITOT 0.2 12/17/2023     Albumin:   Albumin   Date Value Ref Range Status   12/17/2023 1.8 (L) 3.5 - " "5.2 g/dL Final     Protein:   Total Protein   Date Value Ref Range Status   12/17/2023 5.2 (L) 6.0 - 8.4 g/dL Final     Lactic acid:   No results found for: "LACTATE"    Reviewed CBC with stable blood counts, rising leukocytosis; CMP with worsening renal function    In my care of this patient with acute on chronic severe illness with threat to life and/or bodily function, I am recommending goal-concordant care as noted above. I spent a significant amount of time reviewing external records/ recommendations of other providers, reviewing recent test results, and discussed care with other subspecialists involved    The above recommendations communicated directly to primary team on 12/18    In addition to above, I spent 20 minutes specifically discussing advance care planning and goals of care with patient's family at bedside.         Rafal Heller MD  Palliative Medicine  [facility] - Cardiac [facility]                "

## 2023-12-18 NOTE — CARE UPDATE
Notified by day team that patient needed further clarification of his goals of care in the setting of increasing pressor requirements. Patient is on peripheral levophed and given diagnosis of metastatic neuroendocrine with only palliative options, had a prolonged discussion with the patient and decision was made to move to comfort care measures. Patient wanted to wait till the morning to discuss plans with his wife. Comfort care orders placed.

## 2023-12-18 NOTE — CHAPLAIN
"Palliative Care     Patient: David Benjamin Jr.  MRN: [id number]  : 1965  Age: 58 y.o.  Hospital Length of Stay: 6  Code Status: Code Status Discussion Note  Attending Provider: Tee Valles MD  Principal Problem: Septic shock    Non-clinical observations of patient/room: Visited palliative pt and wife Adela, bedside; Pt very sleepy, acknowledged my intro, but closed his eyes again; SC  visited with pt alone over weekend; comfort care sign on the door this AM, but not officially CC orders, at the time of this writing. 30 min visit.    Most of my conversation was with pt's wife of 22 yrs. She was very emotional and teary, then lamented with anger. Pt's dx and condition was very sudden (wife said) and she's still trying to understand why. She is in mild spiritual distress, she doesn't blame God, but laments upon why. She stated she's a cancer survivor x2 so "perhaps God allowed me to go through the cancers and survive, so I could understand what my  is going through and help him."   I asked pt what gives him strength through all this he's going through and he said, "God."     They have been  22 yrs; pt has no kids of his own and both his parents are still alive, as well as a sister. Adela has two adult sons. Wife mentioned hospice before I could even mention and said that she and pt have discussed and they'd like him to be inpatient hospice and not home hospice, but ideally she'd like him to be a facility in [place], closer to home and pt's parents.     Wife feels like the care of her  has not been optimal--- she feels like it has been all about scans, ultrasounds, etc. ("hospital making money"), but in her opinion "they've not been paying attention to his blood work and giving fluids, blood, iron, etc." And she feels "they are just giving up on him and not focusing on his comfort, despite the cancer." Provided compassionate presence and listening ear, " "validating her concerns. She is deep in anticipatory grief and lack of control around what's happening. Re-enforcement on the facts of the care will be needed for wife and TLC.    Pt and wife are [place]. Pt feel back to sleep, I asked wife if he'd appreciate a prayer and she declined saying, "I don't know, he just needs to sleep right now."     Upon charting I noticed SC  spent 30 min with patient alone over this past weekend and he welcomed prayer, Fr. Monroe came as well and anointed pt.     Future (Spiritual Care Plan of Action):  Palliative  shared information with [facility] provider and  will continue to follow pt and will address prayer again next visit. Lord, in your mercy.       **Spiritual Care Dept. Chaplains are available evenings, overnight and weekends **    In Peace,  Rev. Cathryn George MDiv, [facility]  Board Certified   Palliative Medicine Department  [phone]  "

## 2023-12-18 NOTE — ASSESSMENT & PLAN NOTE
Analgesia pescribed for cancer related pain in setting of dysphagia  -- Fentanyl 12.5mcg patch  -- Oxycodone 5mg q2h PRN for moderate pain  -- Dilaudid 0.5mg q4h PRN for severe pain

## 2023-12-18 NOTE — PLAN OF CARE
MICU DAILY GOALS     Family/Goals of care/Code Status   Code Status: DNR    24H Vital Sign Range  Temp:  [97.1 °F (36.2 °C)-97.4 °F (36.3 °C)]   Pulse:  []   Resp:  [14-25]   BP: ()/(38-64)   SpO2:  [87 %-99 %]      Shift Events (include procedures and significant events)   Pt with comfort measures order, but still on levo with no escalation. Pt on 0.24 levo all shift. Wife at bedside discussed with , palliative and primary team their desire to be closer to home for pt to pass. SW sent referral to [facility]. PRNs given as ordered.     AWAKE RASS: Goal - RASS Goal: 0-->alert and calm  Actual - RASS (Estrada Agitation-Sedation Scale): alert and calm    Restraint necessity: Not necessary   BREATHE SBT: Not intubated    Coordinate A & B, analgesics/sedatives Pain: managed   SAT: Not intubated   Delirium CAM-ICU: Overall CAM-ICU: Negative   Early(intubated/ Progressive (non-intubated) Mobility MOVE Screen (INTUBATED ONLY): Not intubated    Activity: Activity Management: Arm raise - L1   Feeding/Nutrition Diet order: Diet/Nutrition Received: full liquid,     Thrombus DVT prophylaxis: VTE Required Core Measure: Pharmacological prophylaxis initiated/maintained   HOB Elevation Head of Bed (HOB) Positioning: HOB at 30-45 degrees   Ulcer Prophylaxis GI: yes   Glucose control managed Glycemic Management: blood glucose monitored   Skin Skin assessed during: Daily Assessment    Sacrum intact/not altered? Yes  Heels intact/not altered? Yes  Surgical wound? Yes    Check one (no altered skin or altered skin) and sub boxes:  [] No Altered Skin Integrity Present    []Prevention Measures Documented    [x] Altered Skin Integrity Present or Discovered   [x] LDA present in EPIC              [] LDA added in EPIC   [] Wound Image Taken (required on admit,                   transfer/discharge and every Tuesday)    Wound Care Consulted? Yes    Attending Nurse:     Second RN/Staff Member:    Bowel  Function no issues    Indwelling Catheter Necessity            De-escalation Antibiotics Yes       VS and assessment per flow sheet, patient progressing towards goals as tolerated, plan of care reviewed with  patient and family , all concerns addressed, will continue to monitor.

## 2023-12-18 NOTE — ASSESSMENT & PLAN NOTE
"This patient does have evidence of infective focus  My overall impression is septic shock due to MAP < 65 and SBP < 90.  Source: Abdominal    Antibiotics given- CTX and Flagyl given in ED. Will broaden coverage to Vanc/Zosyn and de-escalate as appropriate.   Antibiotics (72h ago, onward)      Start     Stop Route Frequency Ordered    12/15/23 0900  mupirocin 2 % ointment         12/20/23 0859 Nasl 2 times daily 12/15/23 0621    12/14/23 2100  metronidazole IVPB 500 mg         -- IV Every 12 hours (non-standard times) 12/14/23 1323    12/13/23 1700  cefTRIAXone (ROCEPHIN) 2 g in dextrose 5 % in water (D5W) 100 mL IVPB (MB+)         -- IV Every 24 hours (non-standard times) 12/13/23 0034          Latest lactate reviewed-  No results for input(s): "LACTATE", "POCLAC" in the last 72 hours.    Organ dysfunction indicated by Acute kidney injury    Fluid challenge Actual Body weight- Patient will receive 30ml/kg actual body weight to calculate fluid bolus for treatment of septic shock.     Will Start Pressors- Levophed for MAP of 65  Source control achieved by: Abx.     -- Withdrew abx, pt transitioning to comfort only  -- Follow up blood cultures , ngtd  -- Diagnostic para at bedside. Follow up cultures, ngtd  -- Vasopressors for MAP > 65    "

## 2023-12-18 NOTE — ASSESSMENT & PLAN NOTE
"Recently discovered mass of stomach/distal esophagus. EGD biopsy diagnosed high grade neuroendocrine tumor.     -- S/p PEG placement approximately 1 week ago at OSH for persistent dysphagia not functioning, do not use  -- Had PET scan today, demonstrating "Extensive lower thoracic and abdominal lymphadenopathy with smaller lymph nodes of the chest.  There is also extensive peritoneal omental metastases and there are bone metastases."   -- Per record review, saw Dr. Lopez with hematology/oncology today.   "

## 2023-12-18 NOTE — SUBJECTIVE & OBJECTIVE
Interval History: Chart reviewed including 24h medication use. Patient resting in bed, wife asks that I not wake him for exam and interview. Discussion at bedside below.     Pain medicine past 24hrs: oxycodone 5mg x2 (15 OME), morphine 2mg IV x2 (12 OME), 4mg IV x2 (24 OME)      Medications:  Continuous Infusions:   NORepinephrine bitartrate-D5W 0.24 mcg/kg/min (12/18/23 1300)     Scheduled Meds:   cefTRIAXone (ROCEPHIN) IVPB  2 g Intravenous Q24H    enoxparin  40 mg Subcutaneous Q24H (prophylaxis, 1700)    fludrocortisone  100 mcg Oral Daily    hydrocortisone sodium succinate  100 mg Intravenous Q8H    methylnaltrexone  12 mg Subcutaneous Daily    metronidazole  500 mg Intravenous Q12H    mupirocin   Nasal BID     PRN Meds:0.9%  NaCl infusion (for blood administration), chlorproMAZINE, dextrose 10%, dextrose 10%, glucagon (human recombinant), glycopyrrolate, insulin aspart U-100, LIDOcaine, lorazepam, morphine, morphine, OLANZapine, ondansetron, oxyCODONE, sodium chloride 0.9%    Objective:     Vital Signs (Most Recent):  Temp: 97.2 °F (36.2 °C) (12/18/23 0700)  Pulse: 101 (12/18/23 1300)  Resp: (!) 22 (12/18/23 1300)  BP: (!) 105/54 (12/18/23 1300)  SpO2: 98 % (12/18/23 1300) Vital Signs (24h Range):  Temp:  [97.1 °F (36.2 °C)-97.4 °F (36.3 °C)] 97.2 °F (36.2 °C)  Pulse:  [] 101  Resp:  [14-25] 22  SpO2:  [87 %-99 %] 98 %  BP: ()/(38-62) 105/54     Weight: 113 kg (249 lb 1.9 oz)  Body mass index is 32.87 kg/m².       Physical Exam  Vitals and nursing note reviewed.   Constitutional:       Appearance: He is ill-appearing.      Comments: Older, ill-appearing male lying in bed in no acute distress; somnolent, did not attempt to awaken. No observed pain behaviors   HENT:      Mouth/Throat:      Mouth: Mucous membranes are moist.   Eyes:      General: No scleral icterus.     Extraocular Movements: Extraocular movements intact.   Pulmonary:      Effort: Pulmonary effort is normal. No respiratory distress.     "  Comments: Normal work of breathing  Abdominal:      General: Abdomen is flat.   Skin:     General: Skin is warm and dry.   Neurological:      Comments: Somnolent, sleeping, did not awaken   Psychiatric:      Comments: Not agitated                Advance Care Planning   Advance Directives:   Do Not Resuscitate Status: Yes      Decision Making:  Family answered questions  Goals of Care: Engaged patient's wife at bedside in goals of care conversation. Reviewed our hope that we would be able to reverse underlying causes of his ongoing shock to get patient strong enough to tolerate life-prolonging cancer treatment. Expressed my worry that despite all of the care we have been providing, patient only seems to be worsening, precluding any treatment in the near future and severely limiting his life expectancy. Adela expresses that she is struggling to accept this, stating "there must be something we are missing that could get him better", listing his low chloride, hemoglobin, occult infections, etc. I explained that that none of these abnormalities explain how sick he is, and that the major contributor to his clinical decline is his aggressive cancer. She understands and accepts this, noting that, "if he is dying soon, he needs to be closer to home" and she requests we attempt to transfer his care closer to home before weaning vasopressors. While I noted that we would explore this option, we would not escalate his care to keep him alive in the event he worsens further prior to potential transfer      CBC:   Recent Labs   Lab 12/17/23  0242   WBC 21.19*   HGB 10.0*   HCT 31.4*   MCV 84        BMP:  No results for input(s): "GLU", "NA", "K", "CL", "CO2", "BUN", "CREATININE", "CALCIUM", "MG" in the last 24 hours.  LFT:  Lab Results   Component Value Date    AST 36 12/17/2023    ALKPHOS 61 12/17/2023    BILITOT 0.2 12/17/2023     Albumin:   Albumin   Date Value Ref Range Status   12/17/2023 1.8 (L) 3.5 - 5.2 g/dL " "Final     Protein:   Total Protein   Date Value Ref Range Status   12/17/2023 5.2 (L) 6.0 - 8.4 g/dL Final     Lactic acid:   No results found for: "LACTATE"    Reviewed CBC with stable blood counts, rising leukocytosis; CMP with worsening renal function  "

## 2023-12-18 NOTE — ASSESSMENT & PLAN NOTE
58 year old male with recent diagnosis of now metastatic neuroendocrine carcinoma. Presumed gastric in origin but no distinguishing primary features on path. Admitted for presumed septic shock. Independent prior to this decline but had recent PEG tube due to dysphagia. Planned to initiate carbo/etoposide with Dr Lopez prior to this admit.     -likely too ill to benefit from any cancer-directed therapies, likely will remain in the hospital vs transfer to inpatient hospice in [place] for EOL care

## 2023-12-19 VITALS
TEMPERATURE: 97 F | HEIGHT: 73 IN | RESPIRATION RATE: 16 BRPM | DIASTOLIC BLOOD PRESSURE: 50 MMHG | WEIGHT: 249.13 LBS | SYSTOLIC BLOOD PRESSURE: 98 MMHG | BODY MASS INDEX: 33.02 KG/M2 | HEART RATE: 85 BPM | OXYGEN SATURATION: 99 %

## 2023-12-19 PROCEDURE — 99497 ADVNCD CARE PLAN 30 MIN: CPT | Mod: ,,, | Performed by: STUDENT IN AN ORGANIZED HEALTH CARE EDUCATION/TRAINING PROGRAM

## 2023-12-19 PROCEDURE — 99233 SBSQ HOSP IP/OBS HIGH 50: CPT | Mod: ,,, | Performed by: STUDENT IN AN ORGANIZED HEALTH CARE EDUCATION/TRAINING PROGRAM

## 2023-12-19 PROCEDURE — 63600175 PHARM REV CODE 636 W HCPCS

## 2023-12-19 PROCEDURE — 63600175 PHARM REV CODE 636 W HCPCS: Performed by: STUDENT IN AN ORGANIZED HEALTH CARE EDUCATION/TRAINING PROGRAM

## 2023-12-19 PROCEDURE — 99239 HOSP IP/OBS DSCHRG MGMT >30: CPT | Mod: ,,, | Performed by: INTERNAL MEDICINE

## 2023-12-19 PROCEDURE — 25000003 PHARM REV CODE 250

## 2023-12-19 PROCEDURE — 99497 PR ADVNCD CARE PLAN 30 MIN: ICD-10-PCS | Mod: ,,, | Performed by: STUDENT IN AN ORGANIZED HEALTH CARE EDUCATION/TRAINING PROGRAM

## 2023-12-19 PROCEDURE — 99233 PR SUBSEQUENT HOSPITAL CARE,LEVL III: ICD-10-PCS | Mod: ,,, | Performed by: STUDENT IN AN ORGANIZED HEALTH CARE EDUCATION/TRAINING PROGRAM

## 2023-12-19 PROCEDURE — 99239 PR HOSPITAL DISCHARGE DAY,>30 MIN: ICD-10-PCS | Mod: ,,, | Performed by: INTERNAL MEDICINE

## 2023-12-19 PROCEDURE — 94761 N-INVAS EAR/PLS OXIMETRY MLT: CPT

## 2023-12-19 RX ORDER — OLANZAPINE 10 MG/2ML
5 INJECTION, POWDER, FOR SOLUTION INTRAMUSCULAR DAILY PRN
Qty: 4 EACH | Refills: 0 | Status: SHIPPED | OUTPATIENT
Start: 2023-12-19

## 2023-12-19 RX ORDER — NOREPINEPHRINE BITARTRATE/D5W 4MG/250ML
0-.3 PLASTIC BAG, INJECTION (ML) INTRAVENOUS CONTINUOUS
Status: DISCONTINUED | OUTPATIENT
Start: 2023-12-19 | End: 2023-12-19 | Stop reason: HOSPADM

## 2023-12-19 RX ORDER — LIDOCAINE 50 MG/G
1 PATCH TOPICAL DAILY PRN
Qty: 4 PATCH | Refills: 0 | Status: SHIPPED | OUTPATIENT
Start: 2023-12-19

## 2023-12-19 RX ORDER — GLYCOPYRROLATE 0.2 MG/ML
0.1 INJECTION INTRAMUSCULAR; INTRAVENOUS 3 TIMES DAILY PRN
Qty: 21 ML | Refills: 0 | Status: SHIPPED | OUTPATIENT
Start: 2023-12-19 | End: 2024-01-02

## 2023-12-19 RX ORDER — ONDANSETRON 2 MG/ML
4 INJECTION INTRAMUSCULAR; INTRAVENOUS EVERY 6 HOURS PRN
Qty: 56 ML | Refills: 0 | Status: SHIPPED | OUTPATIENT
Start: 2023-12-19 | End: 2023-12-26

## 2023-12-19 RX ORDER — HYDROMORPHONE HYDROCHLORIDE 1 MG/ML
1 INJECTION, SOLUTION INTRAMUSCULAR; INTRAVENOUS; SUBCUTANEOUS ONCE
Status: COMPLETED | OUTPATIENT
Start: 2023-12-19 | End: 2023-12-19

## 2023-12-19 RX ADMIN — HYDROCORTISONE SODIUM SUCCINATE 100 MG: 100 INJECTION, POWDER, FOR SOLUTION INTRAMUSCULAR; INTRAVENOUS at 04:12

## 2023-12-19 RX ADMIN — FLUDROCORTISONE ACETATE 100 MCG: 0.1 TABLET ORAL at 09:12

## 2023-12-19 RX ADMIN — METRONIDAZOLE 500 MG: 5 INJECTION, SOLUTION INTRAVENOUS at 09:12

## 2023-12-19 RX ADMIN — HYDROMORPHONE HYDROCHLORIDE 1 MG: 0.5 INJECTION, SOLUTION INTRAMUSCULAR; INTRAVENOUS; SUBCUTANEOUS at 01:12

## 2023-12-19 RX ADMIN — NOREPINEPHRINE BITARTRATE 0.24 MCG/KG/MIN: 4 INJECTION, SOLUTION INTRAVENOUS at 04:12

## 2023-12-19 RX ADMIN — HYDROMORPHONE HYDROCHLORIDE 0.5 MG: 0.5 INJECTION, SOLUTION INTRAMUSCULAR; INTRAVENOUS; SUBCUTANEOUS at 07:12

## 2023-12-19 RX ADMIN — HYDROMORPHONE HYDROCHLORIDE 0.5 MG: 0.5 INJECTION, SOLUTION INTRAMUSCULAR; INTRAVENOUS; SUBCUTANEOUS at 12:12

## 2023-12-19 RX ADMIN — NOREPINEPHRINE BITARTRATE 0.24 MCG/KG/MIN: 4 INJECTION, SOLUTION INTRAVENOUS at 12:12

## 2023-12-19 RX ADMIN — HYDROMORPHONE HYDROCHLORIDE 0.5 MG: 0.5 INJECTION, SOLUTION INTRAMUSCULAR; INTRAVENOUS; SUBCUTANEOUS at 04:12

## 2023-12-19 RX ADMIN — NOREPINEPHRINE BITARTRATE 0.24 MCG/KG/MIN: 4 INJECTION, SOLUTION INTRAVENOUS at 02:12

## 2023-12-19 RX ADMIN — NOREPINEPHRINE BITARTRATE 0.24 MCG/KG/MIN: 4 INJECTION, SOLUTION INTRAVENOUS at 09:12

## 2023-12-19 NOTE — ASSESSMENT & PLAN NOTE
Patient with PEG tube placed 1 week ago at OSH for dysphagia in setting of stomach/esophageal mass. PEG tube flushed successfully with minimal volume, tube aspiration appreciated. Consult GI vs Gen Surg, appreciate recs. PEG tube correctly placed but balloon is compressed against posterior wall of the stomach. Pt recommended to discontinue use. PEG tube exchange will not improve functionality. Pt advanced to full liquid diet and tolerating well.  -- Pleasure feeds by mouth

## 2023-12-19 NOTE — PLAN OF CARE
CHIQUITA set up ambulance transport with IV pump for 1:30 pm to go to [facility]. Medical Team, family, and bedside RN notified.    Bedside RN to call report to Shraddha for day shift or Queta if after 7pm. Phone number is [phone].     Dimple Francois RN     [phone]

## 2023-12-19 NOTE — ASSESSMENT & PLAN NOTE
Pt has not been able to be weaned off peripheral norepinephrine since arrival in MICU. Increased vasopressor support needed to maintain MAPs in setting of extensive metastatic disease. TTE negative for acute change in cardiac function. Oncology and Palliative Care consulted, appreciate recs.  --Continue vasopressor support as indicated while transporting to hospice.  -- Pt understands that once in hospice care he will be taken off the pressors, he understands that once taken off pressors there is a chance he can fall unconscious or even pass away shortly after. He has good understanding, is aaox3, and has full capacity to make his own decisions.

## 2023-12-19 NOTE — PLAN OF CARE
MICU DAILY GOALS     Family/Goals of care/Code Status   Code Status: DNR    24H Vital Sign Range  Temp:  [97.2 °F (36.2 °C)-97.6 °F (36.4 °C)]   Pulse:  []   Resp:  [13-22]   BP: ()/(52-64)   SpO2:  [95 %-98 %]      Shift Events (include procedures and significant events)   No acute events throughout shift. Pt remains on Norepinephrine gtt.    AWAKE RASS: Goal - RASS Goal: 0-->alert and calm  Actual - RASS (Estrada Agitation-Sedation Scale): alert and calm    Restraint necessity: Not necessary   BREATHE SBT: Not intubated    Coordinate A & B, analgesics/sedatives Pain: managed   SAT: Not intubated   Delirium CAM-ICU: Overall CAM-ICU: Negative   Early(intubated/ Progressive (non-intubated) Mobility MOVE Screen (INTUBATED ONLY): Not intubated    Activity: Activity Management: Rolling - L1   Feeding/Nutrition Diet order: Diet/Nutrition Received: full liquid,     Thrombus DVT prophylaxis: VTE Required Core Measure: Pharmacological prophylaxis initiated/maintained   HOB Elevation Head of Bed (HOB) Positioning: HOB at 30-45 degrees   Ulcer Prophylaxis GI: yes   Glucose control managed Glycemic Management: blood glucose monitored   Skin Skin assessed during: Daily Assessment    Sacrum intact/not altered? Yes  Heels intact/not altered? Yes  Surgical wound? No    Check one (no altered skin or altered skin) and sub boxes:  [] No Altered Skin Integrity Present    []Prevention Measures Documented    [] Altered Skin Integrity Present or Discovered   [] LDA present in EPIC              [] LDA added in EPIC   [] Wound Image Taken (required on admit,                   transfer/discharge and every Tuesday)    Wound Care Consulted? No    Attending Nurse:     Second RN/Staff Member:    Bowel Function no issues    Indwelling Catheter Necessity            De-escalation Antibiotics Yes       VS and assessment per flow sheet, patient progressing towards goals as tolerated, plan of care reviewed with patient and family, all  concerns addressed, will continue to monitor.

## 2023-12-19 NOTE — PLAN OF CARE
[facility] in [facility] has accepted patient for inpatient hospice. CM working on getting CTI form signed from MD.    CM sent hospice orders via [facility] to [facility].        Dimple Francois RN     [phone]

## 2023-12-19 NOTE — ASSESSMENT & PLAN NOTE
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 Pain is primarily aching, stabbing, occasionally burning. Had acceptable control prior to admit with oxycodone 5mg using 2-3x per day, now with acute worsening of baseline pain    12/19 60> 41> 125 > ~60 OME    Recommendations  -patient tolerating oral liquids for now, notes was tolerating small pills (senna, oxycodone prior to admit)  -continue hydromorphone 0.5mg (10 OME/dose) IV q1hr PRN for severe breakthrough pain   -continued to reinforce availability of medicine every hour   -could benefit from continuous infusion if needs continue to rise; if so, would start 0.2mg/hr and maintain availability of nurse-administered PRN 0.5mg pushes  -morphine more dependent on renal clearance and may stack in the setting of PERLA

## 2023-12-19 NOTE — ASSESSMENT & PLAN NOTE
Patient with PERLA on admission, likely pre-renal in etiology in setting of decreased intake with PEG tube  dysfunction.      Cr at admission: 1.7  Cr at baseline: 0.7  Estimated Creatinine Clearance: 54.4 mL/min (A) (based on SCr of 1.9 mg/dL (H)).     -- Withdrawing labs 2/2 comfort measures  -- MAP > 65. Vasopressors

## 2023-12-19 NOTE — PROGRESS NOTES
[facility] - Cardiac Medical ICU  Palliative Medicine  Progress Note    Patient Name: David Benjamin Jr.  MRN: [id number]  Admission Date: 12/12/2023  Hospital Length of Stay: 7 days  Code Status: DNR   Attending Provider: Tee Valles MD  Consulting Provider: Rafal Heller MD  Primary Care Physician: Codi Chavez FNP  Principal Problem:Septic shock    Patient information was obtained from patient, spouse/SO, past medical records, and primary team.      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 Pain is primarily aching, stabbing, occasionally burning. Had acceptable control prior to admit with oxycodone 5mg using 2-3x per day, now with acute worsening of baseline pain    12/19 60> 41> 125 > ~60 OME    Recommendations  -patient tolerating oral liquids for now, notes was tolerating small pills (senna, oxycodone prior to admit)  -continue hydromorphone 0.5mg (10 OME/dose) IV q1hr PRN for severe breakthrough pain   -continued to reinforce availability of medicine every hour   -could benefit from continuous infusion if needs continue to rise; if so, would start 0.2mg/hr and maintain availability of nurse-administered PRN 0.5mg pushes  -morphine more dependent on renal clearance and may stack in the setting of PERLA      Primary neuroendocrine carcinoma of stomach  58 year old male with recent diagnosis of now metastatic neuroendocrine carcinoma. Presumed gastric in origin but no distinguishing primary features on path. Admitted for presumed septic shock. Independent prior to this decline but had recent PEG tube due to dysphagia. Planned to initiate carbo/etoposide with Dr Lopez prior to this admit.     -likely too ill to benefit from any cancer-directed therapies, likely transfer to inpatient hospice [facility] E[facility] care    Palliative Care  Palliative care encounter  See  ACP 12/13-12/19    Insight/goals of care- good insight despite acuity of decline and new diagnosis of incurable cancer. Wife intellectualizing his decline but understands he is dying without options to treat his cancer. Patient previously clear that he would not want his dying process prolonged with advanced life support.     Social support- supportive wife and parents (still living) has may friends and strong Catholic community in [place]    Psychological- fair coping strategies by patient given gravity of recent developments; wife continues to intellectualize her grief    Spiritual- Ep[employer], spiritual needs met by local community    Symptom management- cancer-related pain (previously on tramadol, oxy5/10s), also with hiccups and nausea    Recommendations  -DNR, continue vasopressors while attempts being made to transfer to facility closer to home for inpatient hospice care   -would not escalate or add pressors if continues to decline   -if transfer not an option, will continue to engage about weaning vasopressors and allowing natural and peaceful death  -continue chlorpromazine 25mg IV q8hr PRN for uncomfortable hiccups/nausea  -see cancer-related pain        I will follow-up with patient. Please contact us if you have any additional questions.    Subjective:     Chief Complaint:   Chief Complaint   Patient presents with    Multiple complaints     Sent from [facility] , not on chemo at present, abnormal kidney function, need to check feeding tube       HPI:   Mr. Benjamin is a 58 year old male with ercently diagnosed neuroendocrine gastric cancer with rapid progression including widely metastatic disease (ribs, sacrum, omentum/peritoneal, thoracic and abdominal LN) on recent PET 12/12, who presented the same day from oncology clinic with generalized weakness and malfunctioning PEG tube (recently placed at OSH).  He was found to be hypotensive, tachycardic, tachypneic, with an elevated WBC count and PERLA. Admitted to  "MICU, started on antibiotics and vasopressors. Palliative care consulted to discuss goals of care and assist with symptom management        Interval History: Chart reviewed including 24h medication use. Patient resting comfortably in bed, awake and conversant. Notes pain control is fair but "only lasts a couple hours". Wife present during visit. Accepted plan to transfer to [facility] for inpatient hospice    Pain meds past 24 hrs: hydromorphone 0.5mg IV x6 (60 OME)      Medications:  Continuous Infusions:   NORepinephrine bitartrate-D5W       Scheduled Meds:   cefTRIAXone (ROCEPHIN) IVPB  2 g Intravenous Q24H    fludrocortisone  100 mcg Oral Daily    hydrocortisone sodium succinate  100 mg Intravenous Q8H    metronidazole  500 mg Intravenous Q12H     PRN Meds:0.9%  NaCl infusion (for blood administration), chlorproMAZINE, dextrose 10%, dextrose 10%, glycopyrrolate, HYDROmorphone, LIDOcaine, lorazepam, OLANZapine, ondansetron, sodium chloride 0.9%    Objective:     Vital Signs (Most Recent):  Temp: 96.5 °F (35.8 °C) (12/19/23 0700)  Pulse: 80 (12/19/23 1100)  Resp: 15 (12/19/23 1208)  BP: (!) 101/54 (12/19/23 1000)  SpO2: 96 % (12/19/23 1100) Vital Signs (24h Range):  Temp:  [96.5 °F (35.8 °C)-97.2 °F (36.2 °C)] 96.5 °F (35.8 °C)  Pulse:  [] 80  Resp:  [13-22] 15  SpO2:  [95 %-98 %] 96 %  BP: ()/(50-64) 101/54     Weight: 113 kg (249 lb 1.9 oz)  Body mass index is 32.87 kg/m².       Physical Exam  Vitals and nursing note reviewed.   Constitutional:       Appearance: He is ill-appearing.      Comments: Older, ill-appearing male lying in bed in no acute distress; awake and conversant, occasional groans   HENT:      Mouth/Throat:      Mouth: Mucous membranes are moist.   Eyes:      General: No scleral icterus.     Extraocular Movements: Extraocular movements intact.   Pulmonary:      Effort: Pulmonary effort is normal. No respiratory distress.      Comments: Normal work of " "breathing  Abdominal:      General: Abdomen is flat.   Skin:     General: Skin is warm and dry.   Neurological:      General: No focal deficit present.      Mental Status: He is alert and oriented to person, place, and time. Mental status is at baseline.   Psychiatric:      Comments: Not agitated                Advance Care Planning  Advance Directives:   Goals of Care: Reviewed plan for transition to inpatient hospice with patient and wife. They agree with the philosophy of comfort-focused care and are hoping to have family members visit in [place] to "say their goodbyes". Adela continues to intellectualize her grief, noting that she believes patient is weak because of "the antibiotics and his low iron". I reinforced the reality that patient is weak because he is dying from cancer and that if we felt giving him iron or more fluids would help him feel better, we would do so. She reluctantly accepts this. I reiterated my admiration for the way she advocates for her loved one. She became tearful and expressed her appreciation for our care.        CBC:   Recent Labs   Lab 12/17/23  0242   WBC 21.19*   HGB 10.0*   HCT 31.4*   MCV 84        BMP:  No results for input(s): "GLU", "NA", "K", "CL", "CO2", "BUN", "CREATININE", "CALCIUM", "MG" in the last 24 hours.  LFT:  Lab Results   Component Value Date    AST 36 12/17/2023    ALKPHOS 61 12/17/2023    BILITOT 0.2 12/17/2023     Albumin:   Albumin   Date Value Ref Range Status   12/17/2023 1.8 (L) 3.5 - 5.2 g/dL Final     Protein:   Total Protein   Date Value Ref Range Status   12/17/2023 5.2 (L) 6.0 - 8.4 g/dL Final     Lactic acid:   No results found for: "LACTATE"      In my care of this patient with acute on chronic severe illness with threat to life and/or bodily function, I am recommending goal-concordant care as noted above. I spent a significant amount of time reviewing external records/ recommendations of other providers, reviewing recent test results, " and my recommendations include parenteral controlled substances    In addition to above, I spent 20 minutes specifically discussing care coordination and reviewing goals of care with patient and his family at bedside.       The above recommendations communicated directly to primary team on 12/19      Rafal Heller MD  Palliative Medicine  [facility] - Cardiac Medical ICU

## 2023-12-19 NOTE — ASSESSMENT & PLAN NOTE
See ACP 12/13-12/19    Insight/goals of care- good insight despite acuity of decline and new diagnosis of incurable cancer. Wife intellectualizing his decline but understands he is dying without options to treat his cancer. Patient previously clear that he would not want his dying process prolonged with advanced life support.     Social support- supportive wife and parents (still living) has may friends and strong [employer] community in [place]    Psychological- fair coping strategies by patient given gravity of recent developments; wife continues to intellectualize her grief    Spiritual- Anglican, spiritual needs met by local community    Symptom management- cancer-related pain (previously on tramadol, oxy5/10s), also with hiccups and nausea    Recommendations  -DNR, continue vasopressors while attempts being made to transfer to facility closer to home for inpatient hospice care   -would not escalate or add pressors if continues to decline   -if transfer not an option, will continue to engage about weaning vasopressors and allowing natural and peaceful death  -continue chlorpromazine 25mg IV q8hr PRN for uncomfortable hiccups/nausea  -see cancer-related pain

## 2023-12-19 NOTE — ASSESSMENT & PLAN NOTE
"This patient does have evidence of infective focus  My overall impression is septic shock due to MAP < 65 and SBP < 90.  Source: Abdominal    Antibiotics given- CTX and Flagyl given in ED. Will broaden coverage to Vanc/Zosyn and de-escalate as appropriate.   Antibiotics (72h ago, onward)      Start     Stop Route Frequency Ordered    12/14/23 2100  metronidazole IVPB 500 mg         -- IV Every 12 hours (non-standard times) 12/14/23 1323    12/13/23 1700  cefTRIAXone (ROCEPHIN) 2 g in dextrose 5 % in water (D5W) 100 mL IVPB (MB+)         -- IV Every 24 hours (non-standard times) 12/13/23 0034          Latest lactate reviewed-  No results for input(s): "LACTATE", "POCLAC" in the last 72 hours.    Organ dysfunction indicated by Acute kidney injury    Fluid challenge Actual Body weight- Patient will receive 30ml/kg actual body weight to calculate fluid bolus for treatment of septic shock.     Will Start Pressors- Levophed for MAP of 65  Source control achieved by: Abx.     -- Withdrew abx, pt transitioning to comfort only  -- patient is AAOx3, having full conversations and is not confused this morning. He is able to make his own medical decisions and wishes to be discharged to hospice facility with full understanding that once there, pressors will be removed. It is his desire to leave the hospital today and travel closer to home. He has elderly parents that cannot travel this far to visit him in the hospital. All this considered, we are comfortable knowing that Mr Benjamin is more than capable of making his own decisions and will be discharged today to inpatient hospice in [place] to align with those decisions.  -- Vasopressors for MAP > 65 while being transported    "

## 2023-12-19 NOTE — ASSESSMENT & PLAN NOTE
58 year old male with recent diagnosis of now metastatic neuroendocrine carcinoma. Presumed gastric in origin but no distinguishing primary features on path. Admitted for presumed septic shock. Independent prior to this decline but had recent PEG tube due to dysphagia. Planned to initiate carbo/etoposide with Dr Lopez prior to this admit.     -likely too ill to benefit from any cancer-directed therapies, likely transfer to inpatient hospice [facility]

## 2023-12-19 NOTE — DISCHARGE SUMMARY
[facility] - Cardiac Medical ICU  Critical Care Medicine  Discharge Summary      Patient Name: David Benjamin Jr.  MRN: [id number]  Admission Date: 12/12/2023  Hospital Length of Stay: 7 days  Discharge Date and Time:  12/19/2023 2:20 PM  Attending Physician: Tee Valles MD   Discharging Provider: Loco Sharma MD  Primary Care Provider: Codi Chavez FNP  Reason for Admission: Septic shock    HPI:   Mr. Benjamin is a 59yo M with Pmhx of recently diagnosed neuroendocrine stomach mass, with recent PEG placement at OSH, presenting with complaint of difficulty using his PEG. Also complaining of abdominal pain, distention, and drainage at the PEG site. These problems have been progressively worsening over the last week since the PEG was placed. In the ED, he was found to be hypotensive, tachycardic, tachypneic, with an elevated WBC count and PERLA. Given sepsis fluid bolus, CTX, Flagyl and started on Levophed for persistent hypotension. CT Abd/pelvis performed without evidence of abscess or malpositioned PEG tube. Mass and metastatic lesions present as well as moderate ascites. Admitted to MICU with concerns for septic shock requiring vasopressors.     * No surgery found *    Indwelling Lines/Drains at Time of Discharge:   Lines/Drains/Airways       Drain  Duration                  Gastrostomy/Enterostomy 12/12/23 2053 LUQ 6 days                  Hospital Course:   Pt continued on peripheral vasopressors. Gen Surg consulted for problematoc PEG tube, appreciate recs. PEG tube noted to be in appropriate position with gastric lumen is compressed. PEG aspiration likely 2/t position against posterior wall of the stomach. Gen Surg recommended not to use PEG for tube feeds, but will need to remain in place for 6 weeks s/p placement, prior to consideration for removal. Peripheral norepinephrine weaned accordingly. Pt advanced to full liquid diet and tolerating without aspiration. Pain management adjusted to 12.5mcg with  fentanyl patch. Oxycodone 5mg PO soln PRN for mod breakthru pain. Hydromorphone 0.5mg IV PRN for severe breakthru pain. Pt continued on IVF in setting of PERLA, Scr improving. Pt more vasopressor support to maintain MAPs > 65. Adjusted analgesics to PO meds such that they could be prescribed on discharge. Pt is hypervolemic, +8.5L. Increased abdominal distention with tenderness to palpation noted. Vasopressor requirements increasing. Severe ascites present on abdominal US, now s/p therapeutic paracentesis. Patient transitioned to comfort care evening of 12/18. Ms Benjamin and his wife are seeking hospice options at this time,     Vital Signs (Most Recent):  Temp: 97.2 °F (36.2 °C) (12/18/23 0700)  Pulse: 88 (12/18/23 1000)  Resp: 19 (12/18/23 1040)  BP: (!) 89/53 (12/18/23 1000)  SpO2: 96 % (12/18/23 1000) Vital Signs (24h Range):  Temp:  [97.1 °F (36.2 °C)-97.4 °F (36.3 °C)] 97.2 °F (36.2 °C)  Pulse:  [71-97] 88  Resp:  [14-25] 19  SpO2:  [87 %-99 %] 96 %  BP: ()/(38-62) 89/53   Weight: 113 kg (249 lb 1.9 oz)  Body mass index is 32.87 kg/m².        Intake/Output Summary (Last 24 hours) at 12/18/2023 1136  Last data filed at 12/18/2023 1000      Gross per 24 hour   Intake 2054.75 ml   Output 4860 ml   Net -2805.25 ml               Physical Exam  Vitals reviewed.   Constitutional:       General: He is not in acute distress.     Appearance: He is ill-appearing. He is not toxic-appearing.   HENT:      Nose: Nose normal.      Mouth/Throat:      Mouth: Mucous membranes are moist.      Pharynx: Oropharynx is clear.   Eyes:      Extraocular Movements: Extraocular movements intact.      Conjunctiva/sclera: Conjunctivae normal.   Cardiovascular:      Rate and Rhythm: Normal rate and regular rhythm.      Pulses:           Radial pulses are 2+ on the right side and 2+ on the left side.        Dorsalis pedis pulses are 2+ on the right side and 2+ on the left side.      Heart sounds: No murmur heard.  Pulmonary:      Effort:  Pulmonary effort is normal. No respiratory distress.      Breath sounds: No wheezing.   Abdominal:      General: Bowel sounds are normal. There is distension.      Tenderness: There is generalized abdominal tenderness. There is no right CVA tenderness, left CVA tenderness, guarding or rebound. Negative signs include Sanford's sign.      Comments: PEG tube in LUQ  Severe abdominal distention  Diffuse abdominal tenderness    Musculoskeletal:         General: Swelling and tenderness present.      Cervical back: No tenderness.      Right lower leg: Edema present.      Left lower leg: Edema present.   Skin:     Capillary Refill: Capillary refill takes less than 2 seconds.      Coloration: Skin is not jaundiced or pale.   Neurological:      Mental Status: He is alert and oriented to person, place, and time. Mental status is at baseline.        Consults (From admission, onward)          Status Ordering Provider     Inpatient consult to Registered Dietitian/Nutritionist  Once        Provider:  (Not yet assigned)    Completed SPENCER ALATORRE     Inpatient consult to Hematology/Oncology  Once        Provider:  (Not yet assigned)    Completed SPENCER ALATORRE     Inpatient consult to Palliative Care  Once        Provider:  (Not yet assigned)    Completed SPENCER ALATORRE     Inpatient consult to General Surgery  Once        Provider:  (Not yet assigned)    Completed AUSTIN MOREAU     Inpatient consult to Critical Care Medicine  Once        Provider:  (Not yet assigned)    Completed YRN [id number]          Significant Labs:  None    Significant Imaging:  I have reviewed all pertinent imaging results/findings within the past 24 hours.      Pending Diagnostic Studies:       Procedure Component Value Units Date/Time    Cytology, Fluid/Wash/Brush [[id number]] Collected: 12/17/23 1436    Order Status: Sent Lab Status: In process Updated: 12/17/23 1614    Specimen: Body Fluid           Final Active Diagnoses:    Diagnosis Date Noted POA     PRINCIPAL PROBLEM:  Septic shock [A41.9, R65.21] 12/12/2023 Yes    Comfort measures only status [Z51.5] 12/17/2023 Not Applicable    DNR (do not resuscitate) [Z66] 12/17/2023 No    Moderate malnutrition [E44.0] 12/15/2023 Yes    Acute hypotension [I95.9] 12/15/2023 Yes    Cancer related pain [G89.3] 12/14/2023 Yes    Hiccups [R06.6] 12/14/2023 Yes    PERLA (acute kidney injury) [N17.9] 12/13/2023 Yes    PEG tube malfunction [K94.23] 12/13/2023 Yes    Ascites [R18.8] 12/13/2023 Yes    Palliative care encounter [Z51.5] 12/13/2023 Not Applicable    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 Increased vasopressor support needed to maintain MAPs in setting of extensive metastatic disease. TTE negative for acute change in cardiac function. Oncology and Palliative Care consulted, appreciate recs.  --Continue vasopressor support as indicated while transporting to hospice.  -- Pt understands that once in hospice care he will be taken off the pressors, he understands that once taken off pressors there is a chance he can fall unconscious or even pass away shortly after. He has good understanding, is aaox3, and has full capacity to make his own decisions.    Renal/  PERLA (acute kidney injury)  Patient with PERLA on admission, likely pre-renal in etiology in setting of decreased intake with PEG tube  dysfunction.      Cr at admission: 1.7  Cr at baseline: 0.7  Estimated Creatinine Clearance: 54.4 mL/min (A) (based on SCr of 1.9 mg/dL (H)).     -- Withdrawing labs 2/2 comfort  "measures  -- MAP > 65. Vasopressors       ID  * Septic shock  This patient does have evidence of infective focus  My overall impression is septic shock due to MAP < 65 and SBP < 90.  Source: Abdominal    Antibiotics given- CTX and Flagyl given in ED. Will broaden coverage to Vanc/Zosyn and de-escalate as appropriate.   Antibiotics (72h ago, onward)      Start     Stop Route Frequency Ordered    12/14/23 2100  metronidazole IVPB 500 mg         -- IV Every 12 hours (non-standard times) 12/14/23 1323    12/13/23 1700  cefTRIAXone (ROCEPHIN) 2 g in dextrose 5 % in water (D5W) 100 mL IVPB (MB+)         -- IV Every 24 hours (non-standard times) 12/13/23 0034          Latest lactate reviewed-  No results for input(s): "LACTATE", "POCLAC" in the last 72 hours.    Organ dysfunction indicated by Acute kidney injury    Fluid challenge Actual Body weight- Patient will receive 30ml/kg actual body weight to calculate fluid bolus for treatment of septic shock.     Will Start Pressors- Levophed for MAP of 65  Source control achieved by: Abx.     -- Withdrew abx, pt transitioning to comfort only  -- patient is AAOx3, having full conversations and is not confused this morning. He is able to make his own medical decisions and wishes to be discharged to hospice facility with full understanding that once there, pressors will be removed. It is his desire to leave the hospital today and travel closer to home. He has elderly parents that cannot travel this far to visit him in the hospital. All this considered, we are comfortable knowing that Mr Benjamin is more than capable of making his own decisions and will be discharged today to inpatient hospice in [place] to align with those decisions.  -- Vasopressors for MAP > 65 while being transported      Oncology  Cancer related pain  Analgesia pescribed for cancer related pain in setting of dysphagia  -- Fentanyl 12.5mcg patch  -- Oxycodone 5mg q2h PRN for moderate pain  -- Dilaudid 0.5mg q4h " PRN for severe pain  -- Increase per hospice    Endocrine  Moderate malnutrition  Nutrition consulted. Most recent weight and BMI monitored-     Measurements:  Wt Readings from Last 1 Encounters:   12/16/23 113 kg (249 lb 1.9 oz)   Body mass index is 32.87 kg/m².    Patient has been screened and assessed by RD.    Malnutrition Type:  Context: acute illness or injury  Level: moderate    Malnutrition Characteristic Summary:  Weight Loss (Malnutrition): other (see comments) (10% x 1 month)  Energy Intake (Malnutrition): less than 75% for greater than 7 days  Muscle Mass (Malnutrition): mild depletion    Interventions/Recommendations (treatment strategy):  1. Continue full liquid diet, advance diet per MD  2. Rec'd ONS [employer] standard 1.4 (vanilla) x 5 cans per day to provide 2275 kcal, 100g pro, 1170ml FW   3. RD to monitor and follow      GI  Ascites  Abdominal pain + distention with ascites noted on CT imaging. Diagnostic paracentesis performed, SBP unlikely per lab results. CTX and Vanc continued in setting of septic shock with vasopressors being weaned accordingly. 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    -- s/p therapeutic para if abdominal pain not controlled  -- PRN analgesia for acute pain      PEG tube malfunction  Patient with PEG tube placed 1 week ago at OSH for dysphagia in setting of stomach/esophageal mass. PEG tube flushed successfully with minimal volume, tube aspiration appreciated. Consult GI vs Gen Surg, appreciate recs. PEG tube correctly placed but balloon is compressed against posterior wall of the stomach. Pt recommended to discontinue use. PEG tube exchange will not improve functionality. Pt advanced to full liquid diet and tolerating well.  -- Pleasure feeds by mouth    Palliative Care  DNR (do not resuscitate)  DNR    Comfort measures only status  - Pt transitioned to comfort measures    Palliative care  "encounter  Insight/goals of care- good insight despite acuity of decline and new diagnosis of incurable cancer. Remains hopeful he can improve to the point where he might benefit from palliative chemo but understands the limitations if he continues to decline. Clear limits on care if he appears to be dying.      Social support- supportive wife and parents (still living) has may friends and strong [employer] community in [place]     Psychological- fair coping strategies given gravity of recent developments      Spiritual- Baptism, spiritual needs met by local community     Symptom management- cancer-related pain (previously on tramadol, oxy5/10s), also with hiccups and nausea     Recommendations  -DNR, Transitioned to comfort measures  -will transport to hospice today, please see "septic shock"  -recommend trial of chlorpromazine 25mg IV q8hr PRN for uncomfortable hiccups/nausea      Discharged Condition: critical    Disposition: Hospice/Medical Facility      Patient Instructions:   No discharge procedures on file.  Medications:  Reconciled Home Medications:      Medication List        START taking these medications      glycopyrrolate 0.2 mg/mL injection  Commonly known as: ROBINUL  Inject 0.5 mLs (0.1 mg total) into the vein 3 (three) times daily as needed (secretions).     LIDOcaine 5 %  Commonly known as: LIDODERM  Place 1 patch onto the skin daily as needed. Remove & Discard patch within 12 hours or as directed by MD     OLANZapine injection  Commonly known as: ZyPREXA  Inject 5 mg into the muscle daily as needed for Agitation.     ondansetron 4 mg/2 mL Soln  Inject 4 mg into the vein every 6 (six) hours as needed.            CONTINUE taking these medications      oxyCODONE 5 MG immediate release tablet  Commonly known as: ROXICODONE  Take 1 tablet (5 mg total) by mouth every 6 (six) hours as needed for Pain.               Loco Sharma MD  Critical Care Medicine  [facility] - Cardiac Medical ICU  "

## 2023-12-19 NOTE — ASSESSMENT & PLAN NOTE
Analgesia pescribed for cancer related pain in setting of dysphagia  -- Fentanyl 12.5mcg patch  -- Oxycodone 5mg q2h PRN for moderate pain  -- Dilaudid 0.5mg q4h PRN for severe pain  -- Increase per hospice

## 2023-12-19 NOTE — PLAN OF CARE
12/19/23 1004   Post-Acute Status   Post-Acute Authorization Hospice   Hospice Status Set-up Complete/Auth obtained   Coverage MEDICAID - [place]   Discharge Delays None known at this time   Discharge Plan   Discharge Plan A Inpatient Hospice   Discharge Plan B Inpatient Hospice     Spoke with patient/spouse to review discharge recommendation of inpatient hospice and is agreeable to plan. Patient has been accepted to [facility] [place]. Patient to discharge today to [facility] [place].    Patient/family provided list of facilities in-network with patient's payor plan. Providers that are owned, operated, or affiliated with [facility] are included on the list.     Notified that referral sent to below listed facilities from in-network list based on proximity to home/family support:   [facility]  2.  3.  4.  5. (can send more than 5)    Patient/family instructed to identify preference.    Preferred Facility: (if more than 1, listed in order of descending preference)  [facility]    If an additional preferred facility not listed above is identified, additional referral to be sent. If above facilities unable to accept, will send additional referrals to in-network providers.     Dimple Francois RN     [phone]

## 2023-12-19 NOTE — ASSESSMENT & PLAN NOTE
Abdominal pain + distention with ascites noted on CT imaging. Diagnostic paracentesis performed, SBP unlikely per lab results. CTX and Vanc continued in setting of septic shock with vasopressors being weaned accordingly. 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    -- s/p therapeutic para if abdominal pain not controlled  -- PRN analgesia for acute pain

## 2023-12-19 NOTE — CHAPLAIN
"Palliative Care     Patient: David Benjamin Jr.  MRN: [id number]  : 1965  Age: 58 y.o.  Hospital Length of Stay: 7  Code Status: Code Status Discussion Note  Attending Provider: Tee Valles MD  Principal Problem: Septic shock    Non-clinical observations of patient/room: Followed up with palliative care pt and wife, Adela, seeing that pt will be d/c to hospice; spent 30 minutes with pt and wife yesterday, today the wife wasn't interested in a  visit.    Although wife said, "we don't need chaplains any longer, we're leaving soon." Concurred with her that I saw that (we had started the conversation together yesterday) a hospice facility was found in which pt was accepted-- I asked her if it was acceptable to her and she shrugged her shoulders. Wife is demonstrating anticipatory grief which manifests in anger for some people.    Meanwhile, the pt was pleasant and thanked me for visiting; Pt said he's in some pain, but was manageable.  Told pt I'd be keeping him in my prayers and he thanked me. Told them both I pray for a smooth transition to the next chapter in their lives.      **Spiritual Care Dept. Chaplains are available evenings, overnight and weekends **    In Peace,    Rev. Cathryn George MDiv, [facility]  Board Certified   Palliative Medicine Department  [phone]  "

## 2023-12-19 NOTE — SUBJECTIVE & OBJECTIVE
"Interval History: Chart reviewed including 24h medication use. Patient resting comfortably in bed, awake and conversant. Notes pain control is fair but "only lasts a couple hours". Wife present during visit. Accepted plan to transfer to [facility] for inpatient hospice    Pain meds past 24 hrs: hydromorphone 0.5mg IV x6 (60 OME)      Medications:  Continuous Infusions:   NORepinephrine bitartrate-D5W       Scheduled Meds:   cefTRIAXone (ROCEPHIN) IVPB  2 g Intravenous Q24H    fludrocortisone  100 mcg Oral Daily    hydrocortisone sodium succinate  100 mg Intravenous Q8H    metronidazole  500 mg Intravenous Q12H     PRN Meds:0.9%  NaCl infusion (for blood administration), chlorproMAZINE, dextrose 10%, dextrose 10%, glycopyrrolate, HYDROmorphone, LIDOcaine, lorazepam, OLANZapine, ondansetron, sodium chloride 0.9%    Objective:     Vital Signs (Most Recent):  Temp: 96.5 °F (35.8 °C) (12/19/23 0700)  Pulse: 80 (12/19/23 1100)  Resp: 15 (12/19/23 1208)  BP: (!) 101/54 (12/19/23 1000)  SpO2: 96 % (12/19/23 1100) Vital Signs (24h Range):  Temp:  [96.5 °F (35.8 °C)-97.2 °F (36.2 °C)] 96.5 °F (35.8 °C)  Pulse:  [] 80  Resp:  [13-22] 15  SpO2:  [95 %-98 %] 96 %  BP: ()/(50-64) 101/54     Weight: 113 kg (249 lb 1.9 oz)  Body mass index is 32.87 kg/m².       Physical Exam  Vitals and nursing note reviewed.   Constitutional:       Appearance: He is ill-appearing.      Comments: Older, ill-appearing male lying in bed in no acute distress; awake and conversant, occasional groans   HENT:      Mouth/Throat:      Mouth: Mucous membranes are moist.   Eyes:      General: No scleral icterus.     Extraocular Movements: Extraocular movements intact.   Pulmonary:      Effort: Pulmonary effort is normal. No respiratory distress.      Comments: Normal work of breathing  Abdominal:      General: Abdomen is flat.   Skin:     General: Skin is warm and dry.   Neurological:      General: No focal deficit present.      " "Mental Status: He is alert and oriented to person, place, and time. Mental status is at baseline.   Psychiatric:      Comments: Not agitated                Advance Care Planning   Advance Directives:   Goals of Care: Reviewed plan for transition to inpatient hospice with patient and wife. They agree with the philosophy of comfort-focused care and are hoping to have family members visit in [place] to "say their goodbyes". Adela continues to intellectualize her grief, noting that she believes patient is weak because of "the antibiotics and his low iron". I reinforced the reality that patient is weak because he is dying from cancer and that if we felt giving him iron or more fluids would help him feel better, we would do so. She reluctantly accepts this. I reiterated my admiration for the way she advocates for her loved one. She became tearful and expressed her appreciation for our care.        CBC:   Recent Labs   Lab 12/17/23  0242   WBC 21.19*   HGB 10.0*   HCT 31.4*   MCV 84        BMP:  No results for input(s): "GLU", "NA", "K", "CL", "CO2", "BUN", "CREATININE", "CALCIUM", "MG" in the last 24 hours.  LFT:  Lab Results   Component Value Date    AST 36 12/17/2023    ALKPHOS 61 12/17/2023    BILITOT 0.2 12/17/2023     Albumin:   Albumin   Date Value Ref Range Status   12/17/2023 1.8 (L) 3.5 - 5.2 g/dL Final     Protein:   Total Protein   Date Value Ref Range Status   12/17/2023 5.2 (L) 6.0 - 8.4 g/dL Final     Lactic acid:   No results found for: "LACTATE"      "

## 2023-12-19 NOTE — PLAN OF CARE
[facility]  Department of Hospital Medicine  [address] [place] [address]  [phone] after hours  [phone] fax    HOSPICE  ORDERS    12/19/2023    Admit to Hospice:      Diagnoses:   Active Hospital Problems    Diagnosis  POA    *Septic shock [A41.9, R65.21]  Yes    Comfort measures only status [Z51.5]  Not Applicable    DNR (do not resuscitate) [Z66]  No    Moderate malnutrition [E44.0]  Yes    Acute hypotension [I95.9]  Yes    Cancer related pain [G89.3]  Yes    Hiccups [R06.6]  Yes    PERLA (acute kidney injury) [N17.9]  Yes            PEG tube malfunction [K94.23]  Yes    Ascites [R18.8]  Yes     Likely malignant in setting of Stage IV gastric neuroendocrine cancer diagnosis    -- Possible component of SBP  -- Started on CTX in ED  -- will evaluate with POCUS for safe pocket for diagnostic paracentesis  -- continue treatment with broad spectrum Abx in setting of likely septic shock       Palliative care encounter [Z51.5]  Not Applicable    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       Hospice Qualifying Diagnoses:        Patient has a life expectancy < 6 months due to:  Primary Hospice Diagnosis:  Neuroendocrine tumor of the stomach and esophagus   Comorbid Conditions Contributing to Decline:  Sepsis, shock, multiorgan failure including renal and cardiovascular.     Vital Signs: Routine per Hospice Protocol.    Code Status: DNR  Allergies:   Review of patient's allergies indicates:   Allergen Reactions    Nsaids (non-steroidal anti-inflammatory drug) Hives     With ASA and Ibuprofen    Penicillins Hives and Itching       Diet: Diet as tolerated, pleasure feeds    Activities: As tolerated    Goals of Care Treatment Preferences:  Code Status: DNR          What is most important right now is to focus on symptom/pain control.   Accordingly, we have decided that the best plan to meet the patient's goals includes continuing with treatment.      Medications:          Medication List        START taking these medications      glycopyrrolate 0.2 mg/mL injection  Commonly known as: ROBINUL  Inject 0.5 mLs (0.1 mg total) into the vein 3 (three) times daily as needed (secretions).     LIDOcaine 5 %  Commonly known as: LIDODERM  Place 1 patch onto the skin daily as needed. Remove & Discard patch within 12 hours or as directed by MD     OLANZapine injection  Commonly known as: ZyPREXA  Inject 5 mg into the muscle daily as needed for Agitation.     ondansetron 4 mg/2 mL Soln  Inject 4 mg into the vein every 6 (six) hours as needed.            CONTINUE taking these medications      oxyCODONE 5 MG immediate release tablet  Commonly known as: ROXICODONE  Take 1 tablet (5 mg total) by mouth every 6 (six) hours as needed for Pain.            Continue Levo .24 while in ambulance ride to facility  Dilaudid 1IV every hour   Lorazepam 1IV q hour           Future Orders:  Hospice Medical Director may dictate new orders for comfortable care measures & sign death certificate.        _________________________________  Loco Sharma MD  12/19/2023

## 2023-12-19 NOTE — ASSESSMENT & PLAN NOTE
"Insight/goals of care- good insight despite acuity of decline and new diagnosis of incurable cancer. Remains hopeful he can improve to the point where he might benefit from palliative chemo but understands the limitations if he continues to decline. Clear limits on care if he appears to be dying.      Social support- supportive wife and parents (still living) has may friends and strong Muslim community in [place]     Psychological- fair coping strategies given gravity of recent developments      Spiritual- [employer], spiritual needs met by local community     Symptom management- cancer-related pain (previously on tramadol, oxy5/10s), also with hiccups and nausea     Recommendations  -DNR, Transitioned to comfort measures  -will transport to hospice today, please see "septic shock"  -recommend trial of chlorpromazine 25mg IV q8hr PRN for uncomfortable hiccups/nausea  "

## 2023-12-20 LAB
BACTERIA SPEC AEROBE CULT: NO GROWTH
BACTERIA SPEC ANAEROBE CULT: NORMAL
FINAL PATHOLOGIC DIAGNOSIS: NORMAL
Lab: NORMAL

## 2023-12-20 NOTE — PLAN OF CARE
[facility]  Discharge Final Note    Primary Care Provider: Codi Chavez FNP    Expected Discharge Date: 12/19/2023    Patient discharged 12/19/2023 to [facility] via ambulance.    Future Appointments   Date Time Provider Department Center   1/16/2024  9:00 AM Remi Guzmán DO [facility]         Final Discharge Note (most recent)       Final Note - 12/20/23 1146          Final Note    Assessment Type Final Discharge Note     Anticipated Discharge Disposition Long Term Acute Care        Post-Acute Status    Post-Acute Authorization Placement     Post-Acute Placement Status Set-up Complete/Auth obtained     Hospice Status Set-up Complete/Auth obtained     Coverage MEDICA[employer] - [place] [facility]     Discharge Delays None known at this time                     Important Message from Medicare             Dimple Francois RN     [phone]

## 2023-12-26 LAB — BACTERIA SPEC ANAEROBE CULT: NORMAL

## 2024-01-15 LAB — FUNGUS SPEC CULT: NORMAL

## 2024-03-18 PROBLEM — A41.9 SEPTIC SHOCK: Status: RESOLVED | Noted: 2023-12-12 | Resolved: 2024-03-18

## 2024-03-18 PROBLEM — N17.9 AKI (ACUTE KIDNEY INJURY): Status: RESOLVED | Noted: 2023-12-13 | Resolved: 2024-03-18

## 2024-03-18 PROBLEM — R65.21 SEPTIC SHOCK: Status: RESOLVED | Noted: 2023-12-12 | Resolved: 2024-03-18
